# Patient Record
Sex: MALE | Race: WHITE | Employment: FULL TIME | URBAN - NONMETROPOLITAN AREA
[De-identification: names, ages, dates, MRNs, and addresses within clinical notes are randomized per-mention and may not be internally consistent; named-entity substitution may affect disease eponyms.]

---

## 2018-05-29 ENCOUNTER — INITIAL CONSULT (OUTPATIENT)
Dept: SURGERY | Age: 68
End: 2018-05-29
Payer: COMMERCIAL

## 2018-05-29 VITALS
DIASTOLIC BLOOD PRESSURE: 86 MMHG | HEIGHT: 74 IN | BODY MASS INDEX: 37.73 KG/M2 | WEIGHT: 294 LBS | SYSTOLIC BLOOD PRESSURE: 138 MMHG | HEART RATE: 88 BPM

## 2018-05-29 DIAGNOSIS — Z86.010 HISTORY OF COLON POLYPS: Primary | ICD-10-CM

## 2018-05-29 PROCEDURE — 99214 OFFICE O/P EST MOD 30 MIN: CPT | Performed by: SURGERY

## 2018-05-29 RX ORDER — POLYETHYLENE GLYCOL 3350, SODIUM CHLORIDE, SODIUM BICARBONATE, POTASSIUM CHLORIDE 420; 11.2; 5.72; 1.48 G/4L; G/4L; G/4L; G/4L
POWDER, FOR SOLUTION ORAL
Qty: 1 BOTTLE | Refills: 0 | Status: SHIPPED | OUTPATIENT
Start: 2018-05-29 | End: 2019-12-07

## 2018-06-07 ENCOUNTER — APPOINTMENT (OUTPATIENT)
Dept: GENERAL RADIOLOGY | Age: 68
End: 2018-06-07
Payer: COMMERCIAL

## 2018-06-07 ENCOUNTER — HOSPITAL ENCOUNTER (OUTPATIENT)
Age: 68
Setting detail: SPECIMEN
Discharge: HOME | End: 2018-06-07
Payer: COMMERCIAL

## 2018-06-07 ENCOUNTER — HOSPITAL ENCOUNTER (EMERGENCY)
Age: 68
Discharge: ANOTHER HOSPITAL | End: 2018-06-07
Attending: EMERGENCY MEDICINE
Payer: COMMERCIAL

## 2018-06-07 ENCOUNTER — OFFICE VISIT (OUTPATIENT)
Dept: PRIMARY CARE CLINIC | Age: 68
End: 2018-06-07
Payer: COMMERCIAL

## 2018-06-07 VITALS
TEMPERATURE: 100.6 F | BODY MASS INDEX: 38.39 KG/M2 | RESPIRATION RATE: 17 BRPM | HEART RATE: 117 BPM | SYSTOLIC BLOOD PRESSURE: 129 MMHG | OXYGEN SATURATION: 92 % | WEIGHT: 295 LBS | DIASTOLIC BLOOD PRESSURE: 67 MMHG

## 2018-06-07 VITALS
WEIGHT: 296 LBS | HEART RATE: 94 BPM | TEMPERATURE: 99.9 F | OXYGEN SATURATION: 94 % | SYSTOLIC BLOOD PRESSURE: 132 MMHG | BODY MASS INDEX: 38.52 KG/M2 | DIASTOLIC BLOOD PRESSURE: 74 MMHG

## 2018-06-07 DIAGNOSIS — R30.0 DYSURIA: ICD-10-CM

## 2018-06-07 DIAGNOSIS — N30.00 ACUTE CYSTITIS WITHOUT HEMATURIA: ICD-10-CM

## 2018-06-07 DIAGNOSIS — R00.0 TACHYCARDIA: ICD-10-CM

## 2018-06-07 DIAGNOSIS — R55 NEAR SYNCOPE: Primary | ICD-10-CM

## 2018-06-07 DIAGNOSIS — N39.0 BACTERIAL UTI: Primary | ICD-10-CM

## 2018-06-07 DIAGNOSIS — A49.9 BACTERIAL UTI: Primary | ICD-10-CM

## 2018-06-07 LAB
-: ABNORMAL
ABSOLUTE EOS #: 0.11 K/UL (ref 0–0.4)
ABSOLUTE IMMATURE GRANULOCYTE: ABNORMAL K/UL (ref 0–0.3)
ABSOLUTE LYMPH #: 0.56 K/UL (ref 1–4.8)
ABSOLUTE MONO #: 0.56 K/UL (ref 0.1–1.2)
AMORPHOUS: ABNORMAL
ANION GAP SERPL CALCULATED.3IONS-SCNC: 18 MMOL/L (ref 9–17)
BACTERIA: ABNORMAL
BASOPHILS # BLD: 1 % (ref 0–2)
BASOPHILS ABSOLUTE: 0.11 K/UL (ref 0–0.2)
BILIRUBIN URINE: NEGATIVE
BUN BLDV-MCNC: 21 MG/DL (ref 8–23)
BUN/CREAT BLD: 30 (ref 9–20)
CALCIUM SERPL-MCNC: 9.7 MG/DL (ref 8.6–10.4)
CASTS UA: ABNORMAL /LPF (ref 0–2)
CHLORIDE BLD-SCNC: 96 MMOL/L (ref 98–107)
CO2: 21 MMOL/L (ref 20–31)
COLOR: ABNORMAL
COMMENT UA: ABNORMAL
CREAT SERPL-MCNC: 0.71 MG/DL (ref 0.7–1.2)
CRYSTALS, UA: ABNORMAL /HPF
DIFFERENTIAL TYPE: ABNORMAL
EKG ATRIAL RATE: 132 BPM
EKG P AXIS: 68 DEGREES
EKG P-R INTERVAL: 146 MS
EKG Q-T INTERVAL: 300 MS
EKG QRS DURATION: 98 MS
EKG QTC CALCULATION (BAZETT): 444 MS
EKG R AXIS: -104 DEGREES
EKG T AXIS: 78 DEGREES
EKG VENTRICULAR RATE: 132 BPM
EOSINOPHILS RELATIVE PERCENT: 1 % (ref 1–8)
EPITHELIAL CELLS UA: ABNORMAL /HPF (ref 0–5)
GFR AFRICAN AMERICAN: >60 ML/MIN
GFR NON-AFRICAN AMERICAN: >60 ML/MIN
GFR SERPL CREATININE-BSD FRML MDRD: ABNORMAL ML/MIN/{1.73_M2}
GFR SERPL CREATININE-BSD FRML MDRD: ABNORMAL ML/MIN/{1.73_M2}
GLUCOSE BLD-MCNC: 318 MG/DL (ref 70–99)
GLUCOSE URINE: ABNORMAL
HCT VFR BLD CALC: 48.9 % (ref 41–53)
HEMOGLOBIN: 16.5 G/DL (ref 13.5–17.5)
IMMATURE GRANULOCYTES: ABNORMAL %
KETONES, URINE: ABNORMAL
LACTIC ACID: 2.2 MMOL/L (ref 0.5–2.2)
LEUKOCYTE ESTERASE, URINE: ABNORMAL
LYMPHOCYTES # BLD: 5 % (ref 15–43)
MCH RBC QN AUTO: 31.7 PG (ref 26–34)
MCHC RBC AUTO-ENTMCNC: 33.8 G/DL (ref 31–37)
MCV RBC AUTO: 93.8 FL (ref 80–100)
MONOCYTES # BLD: 5 % (ref 6–14)
MORPHOLOGY: ABNORMAL
MUCUS: ABNORMAL
NITRITE, URINE: POSITIVE
NRBC AUTOMATED: ABNORMAL PER 100 WBC
OTHER OBSERVATIONS UA: ABNORMAL
PDW BLD-RTO: 16 % (ref 11–14.5)
PH UA: 5.5 (ref 5–6)
PLATELET # BLD: 95 K/UL (ref 140–450)
PLATELET ESTIMATE: ABNORMAL
PMV BLD AUTO: 10.6 FL (ref 6–12)
POTASSIUM SERPL-SCNC: 4.6 MMOL/L (ref 3.7–5.3)
PROTEIN UA: NEGATIVE
RBC # BLD: 5.22 M/UL (ref 4.5–5.9)
RBC # BLD: ABNORMAL 10*6/UL
RBC UA: ABNORMAL /HPF (ref 0–4)
RENAL EPITHELIAL, UA: ABNORMAL /HPF
SEG NEUTROPHILS: 88 % (ref 44–74)
SEGMENTED NEUTROPHILS ABSOLUTE COUNT: 9.76 K/UL (ref 1.8–7.7)
SODIUM BLD-SCNC: 135 MMOL/L (ref 135–144)
SPECIFIC GRAVITY UA: 1.01 (ref 1.01–1.02)
TRICHOMONAS: ABNORMAL
TROPONIN INTERP: NORMAL
TROPONIN T: <0.03 NG/ML
TURBIDITY: ABNORMAL
URINE HGB: ABNORMAL
UROBILINOGEN, URINE: NORMAL
WBC # BLD: 11.1 K/UL (ref 3.5–11)
WBC # BLD: ABNORMAL 10*3/UL
WBC UA: ABNORMAL /HPF (ref 0–4)
YEAST: ABNORMAL

## 2018-06-07 PROCEDURE — 80048 BASIC METABOLIC PNL TOTAL CA: CPT

## 2018-06-07 PROCEDURE — 71045 X-RAY EXAM CHEST 1 VIEW: CPT

## 2018-06-07 PROCEDURE — 99202 OFFICE O/P NEW SF 15 MIN: CPT | Performed by: FAMILY MEDICINE

## 2018-06-07 PROCEDURE — 87086 URINE CULTURE/COLONY COUNT: CPT

## 2018-06-07 PROCEDURE — 2580000003 HC RX 258: Performed by: EMERGENCY MEDICINE

## 2018-06-07 PROCEDURE — 36415 COLL VENOUS BLD VENIPUNCTURE: CPT

## 2018-06-07 PROCEDURE — 93005 ELECTROCARDIOGRAM TRACING: CPT

## 2018-06-07 PROCEDURE — 83605 ASSAY OF LACTIC ACID: CPT

## 2018-06-07 PROCEDURE — 2500000003 HC RX 250 WO HCPCS

## 2018-06-07 PROCEDURE — 87040 BLOOD CULTURE FOR BACTERIA: CPT

## 2018-06-07 PROCEDURE — 87186 SC STD MICRODIL/AGAR DIL: CPT

## 2018-06-07 PROCEDURE — 6360000002 HC RX W HCPCS: Performed by: EMERGENCY MEDICINE

## 2018-06-07 PROCEDURE — 96375 TX/PRO/DX INJ NEW DRUG ADDON: CPT

## 2018-06-07 PROCEDURE — 84484 ASSAY OF TROPONIN QUANT: CPT

## 2018-06-07 PROCEDURE — 96365 THER/PROPH/DIAG IV INF INIT: CPT

## 2018-06-07 PROCEDURE — 85025 COMPLETE CBC W/AUTO DIFF WBC: CPT

## 2018-06-07 PROCEDURE — 87088 URINE BACTERIA CULTURE: CPT

## 2018-06-07 PROCEDURE — 99285 EMERGENCY DEPT VISIT HI MDM: CPT

## 2018-06-07 PROCEDURE — 81001 URINALYSIS AUTO W/SCOPE: CPT

## 2018-06-07 RX ORDER — LABETALOL HYDROCHLORIDE 5 MG/ML
INJECTION, SOLUTION INTRAVENOUS
Status: COMPLETED
Start: 2018-06-07 | End: 2018-06-07

## 2018-06-07 RX ORDER — LABETALOL HYDROCHLORIDE 5 MG/ML
10 INJECTION, SOLUTION INTRAVENOUS ONCE
Status: COMPLETED | OUTPATIENT
Start: 2018-06-07 | End: 2018-06-07

## 2018-06-07 RX ORDER — CIPROFLOXACIN 500 MG/1
500 TABLET, FILM COATED ORAL 2 TIMES DAILY
Qty: 14 TABLET | Refills: 0 | Status: SHIPPED | OUTPATIENT
Start: 2018-06-07 | End: 2018-06-14

## 2018-06-07 RX ORDER — 0.9 % SODIUM CHLORIDE 0.9 %
1000 INTRAVENOUS SOLUTION INTRAVENOUS ONCE
Status: COMPLETED | OUTPATIENT
Start: 2018-06-07 | End: 2018-06-07

## 2018-06-07 RX ORDER — CIPROFLOXACIN 2 MG/ML
400 INJECTION, SOLUTION INTRAVENOUS ONCE
Status: COMPLETED | OUTPATIENT
Start: 2018-06-07 | End: 2018-06-07

## 2018-06-07 RX ORDER — ONDANSETRON 2 MG/ML
4 INJECTION INTRAMUSCULAR; INTRAVENOUS ONCE
Status: COMPLETED | OUTPATIENT
Start: 2018-06-07 | End: 2018-06-07

## 2018-06-07 RX ADMIN — LABETALOL HYDROCHLORIDE 10 MG: 5 INJECTION, SOLUTION INTRAVENOUS at 19:16

## 2018-06-07 RX ADMIN — ONDANSETRON 4 MG: 2 INJECTION INTRAMUSCULAR; INTRAVENOUS at 18:34

## 2018-06-07 RX ADMIN — CIPROFLOXACIN 400 MG: 2 INJECTION, SOLUTION INTRAVENOUS at 20:19

## 2018-06-07 RX ADMIN — SODIUM CHLORIDE 1000 ML: 9 INJECTION, SOLUTION INTRAVENOUS at 18:35

## 2018-06-07 ASSESSMENT — ENCOUNTER SYMPTOMS
NAUSEA: 1
EYES NEGATIVE: 1
RESPIRATORY NEGATIVE: 1

## 2018-06-09 LAB
CULTURE: ABNORMAL
CULTURE: ABNORMAL
Lab: ABNORMAL
ORGANISM: ABNORMAL
SPECIMEN DESCRIPTION: ABNORMAL
SPECIMEN DESCRIPTION: ABNORMAL
STATUS: ABNORMAL

## 2018-06-14 LAB
CULTURE: NORMAL
CULTURE: NORMAL
Lab: NORMAL
SPECIMEN DESCRIPTION: NORMAL
SPECIMEN DESCRIPTION: NORMAL
STATUS: NORMAL

## 2018-07-26 ENCOUNTER — TELEPHONE (OUTPATIENT)
Dept: SURGERY | Age: 68
End: 2018-07-26

## 2018-07-26 DIAGNOSIS — Z12.11 COLON CANCER SCREENING: Primary | ICD-10-CM

## 2018-07-26 NOTE — TELEPHONE ENCOUNTER
Patient called in and said that he did not hold his Coumadin and Mobic prior to colonoscopy on 7/27/18. Colonoscopy rescheduled to 8/24/18. Patient needs new prescription for bowel prep as he has mixed up bowel prep already. Dr. Adalberto Dawson will be notified of upcoming colonoscopy and cardiac risk form will be faxed to him.

## 2018-07-27 RX ORDER — POLYETHYLENE GLYCOL 3350, SODIUM CHLORIDE, SODIUM BICARBONATE, POTASSIUM CHLORIDE 420; 11.2; 5.72; 1.48 G/4L; G/4L; G/4L; G/4L
4000 POWDER, FOR SOLUTION ORAL ONCE
Qty: 4000 ML | Refills: 0 | Status: SHIPPED | OUTPATIENT
Start: 2018-07-27 | End: 2018-07-27

## 2018-07-27 NOTE — TELEPHONE ENCOUNTER
Spoke with patient's wife and Dr. Antonio Lemus had called them and told her to have Kina Bhatia hold his Coumadin and Mobic  for 4 days prior to colonoscopy.

## 2019-12-07 ENCOUNTER — OFFICE VISIT (OUTPATIENT)
Dept: PRIMARY CARE CLINIC | Age: 69
End: 2019-12-07
Payer: COMMERCIAL

## 2019-12-07 ENCOUNTER — HOSPITAL ENCOUNTER (OUTPATIENT)
Age: 69
Setting detail: SPECIMEN
Discharge: HOME | End: 2019-12-07
Payer: COMMERCIAL

## 2019-12-07 VITALS
WEIGHT: 286 LBS | TEMPERATURE: 100.2 F | BODY MASS INDEX: 36.7 KG/M2 | RESPIRATION RATE: 18 BRPM | HEART RATE: 136 BPM | HEIGHT: 74 IN | OXYGEN SATURATION: 92 %

## 2019-12-07 DIAGNOSIS — R11.0 NAUSEA: ICD-10-CM

## 2019-12-07 DIAGNOSIS — R30.0 DYSURIA: ICD-10-CM

## 2019-12-07 DIAGNOSIS — N30.01 ACUTE CYSTITIS WITH HEMATURIA: Primary | ICD-10-CM

## 2019-12-07 LAB
-: ABNORMAL
AMORPHOUS: ABNORMAL
BACTERIA: ABNORMAL
BILIRUBIN URINE: NEGATIVE
CASTS UA: ABNORMAL /LPF (ref 0–2)
COLOR: ABNORMAL
COMMENT UA: ABNORMAL
CRYSTALS, UA: ABNORMAL /HPF
EPITHELIAL CELLS UA: ABNORMAL /HPF (ref 0–5)
GLUCOSE URINE: ABNORMAL
KETONES, URINE: ABNORMAL
LEUKOCYTE ESTERASE, URINE: NEGATIVE
MUCUS: ABNORMAL
NITRITE, URINE: POSITIVE
OTHER OBSERVATIONS UA: ABNORMAL
PH UA: 5 (ref 5–6)
PROTEIN UA: NEGATIVE
RBC UA: ABNORMAL /HPF (ref 0–4)
RENAL EPITHELIAL, UA: ABNORMAL /HPF
SPECIFIC GRAVITY UA: 1.02 (ref 1.01–1.02)
TRICHOMONAS: ABNORMAL
TURBIDITY: ABNORMAL
URINE HGB: ABNORMAL
UROBILINOGEN, URINE: NORMAL
WBC UA: ABNORMAL /HPF (ref 0–4)
YEAST: ABNORMAL

## 2019-12-07 PROCEDURE — 87186 SC STD MICRODIL/AGAR DIL: CPT

## 2019-12-07 PROCEDURE — 87088 URINE BACTERIA CULTURE: CPT

## 2019-12-07 PROCEDURE — 99213 OFFICE O/P EST LOW 20 MIN: CPT | Performed by: NURSE PRACTITIONER

## 2019-12-07 PROCEDURE — 81001 URINALYSIS AUTO W/SCOPE: CPT

## 2019-12-07 PROCEDURE — 87086 URINE CULTURE/COLONY COUNT: CPT

## 2019-12-07 RX ORDER — SILDENAFIL 100 MG/1
TABLET, FILM COATED ORAL
COMMUNITY
Start: 2013-12-27

## 2019-12-07 RX ORDER — CILOSTAZOL 100 MG/1
TABLET ORAL
Refills: 3 | COMMUNITY
Start: 2019-10-07

## 2019-12-07 RX ORDER — DAPAGLIFLOZIN 10 MG/1
10 TABLET, FILM COATED ORAL EVERY MORNING
COMMUNITY
Start: 2019-12-02

## 2019-12-07 RX ORDER — DILTIAZEM HYDROCHLORIDE 120 MG/1
CAPSULE, COATED, EXTENDED RELEASE ORAL
Refills: 11 | COMMUNITY
Start: 2019-11-15

## 2019-12-07 RX ORDER — CIPROFLOXACIN 500 MG/1
500 TABLET, FILM COATED ORAL 2 TIMES DAILY
Qty: 14 TABLET | Refills: 0 | Status: SHIPPED | OUTPATIENT
Start: 2019-12-07 | End: 2019-12-14

## 2019-12-07 RX ORDER — GLIPIZIDE 10 MG/1
TABLET, FILM COATED, EXTENDED RELEASE ORAL
Refills: 1 | COMMUNITY
Start: 2019-11-04 | End: 2022-08-29 | Stop reason: ALTCHOICE

## 2019-12-07 RX ORDER — TRIAMCINOLONE ACETONIDE 0.1 %
PASTE (GRAM) DENTAL
Refills: 1 | COMMUNITY
Start: 2019-10-30

## 2019-12-07 RX ORDER — ATORVASTATIN CALCIUM 40 MG/1
TABLET, FILM COATED ORAL
Refills: 3 | COMMUNITY
Start: 2019-10-21

## 2019-12-07 RX ORDER — FINASTERIDE 5 MG/1
TABLET, FILM COATED ORAL
Refills: 0 | COMMUNITY
Start: 2019-09-24

## 2019-12-07 RX ORDER — ONDANSETRON 4 MG/1
4 TABLET, ORALLY DISINTEGRATING ORAL ONCE
Status: COMPLETED | OUTPATIENT
Start: 2019-12-07 | End: 2019-12-07

## 2019-12-07 RX ADMIN — ONDANSETRON 4 MG: 4 TABLET, ORALLY DISINTEGRATING ORAL at 15:01

## 2019-12-07 ASSESSMENT — ENCOUNTER SYMPTOMS
NAUSEA: 1
RESPIRATORY NEGATIVE: 1
VOMITING: 0

## 2019-12-11 LAB
CULTURE: ABNORMAL
Lab: ABNORMAL
SPECIMEN DESCRIPTION: ABNORMAL

## 2020-11-30 ENCOUNTER — APPOINTMENT (OUTPATIENT)
Dept: CT IMAGING | Age: 70
DRG: 987 | End: 2020-11-30
Payer: COMMERCIAL

## 2020-11-30 ENCOUNTER — APPOINTMENT (OUTPATIENT)
Dept: GENERAL RADIOLOGY | Age: 70
DRG: 987 | End: 2020-11-30
Payer: COMMERCIAL

## 2020-11-30 ENCOUNTER — HOSPITAL ENCOUNTER (INPATIENT)
Age: 70
LOS: 3 days | Discharge: HOME | DRG: 987 | End: 2020-12-03
Attending: EMERGENCY MEDICINE | Admitting: INTERNAL MEDICINE
Payer: COMMERCIAL

## 2020-11-30 PROBLEM — L43.9 LICHEN PLANUS: Status: ACTIVE | Noted: 2020-11-30

## 2020-11-30 PROBLEM — G62.9 PERIPHERAL NEUROPATHY: Status: ACTIVE | Noted: 2020-11-30

## 2020-11-30 PROBLEM — I47.1 SUPRAVENTRICULAR TACHYCARDIA (HCC): Status: ACTIVE | Noted: 2019-11-14

## 2020-11-30 PROBLEM — I47.10 SUPRAVENTRICULAR TACHYCARDIA: Status: RESOLVED | Noted: 2019-11-14 | Resolved: 2020-11-30

## 2020-11-30 PROBLEM — K76.0 FATTY LIVER: Status: ACTIVE | Noted: 2020-11-30

## 2020-11-30 PROBLEM — N48.1 BALANITIS: Status: RESOLVED | Noted: 2017-04-18 | Resolved: 2020-11-30

## 2020-11-30 PROBLEM — N48.1 BALANITIS: Status: ACTIVE | Noted: 2017-04-18

## 2020-11-30 PROBLEM — F32.9 MAJOR DEPRESSIVE DISORDER: Status: ACTIVE | Noted: 2020-11-30

## 2020-11-30 PROBLEM — D68.51 FACTOR V LEIDEN MUTATION (HCC): Status: ACTIVE | Noted: 2019-12-16

## 2020-11-30 PROBLEM — I73.9 PAD (PERIPHERAL ARTERY DISEASE) (HCC): Status: ACTIVE | Noted: 2019-05-10

## 2020-11-30 PROBLEM — U07.1 COVID-19 VIRUS DETECTED: Status: ACTIVE | Noted: 2020-11-30

## 2020-11-30 PROBLEM — J12.82 PNEUMONIA DUE TO COVID-19 VIRUS: Status: ACTIVE | Noted: 2020-11-30

## 2020-11-30 PROBLEM — N41.0 ACUTE PROSTATITIS: Status: ACTIVE | Noted: 2019-12-16

## 2020-11-30 PROBLEM — L03.115 CELLULITIS OF RIGHT FOOT: Status: ACTIVE | Noted: 2019-12-16

## 2020-11-30 PROBLEM — I47.1 SUPRAVENTRICULAR TACHYCARDIA (HCC): Status: RESOLVED | Noted: 2019-11-14 | Resolved: 2020-11-30

## 2020-11-30 PROBLEM — R09.02 HYPOXIA: Status: ACTIVE | Noted: 2020-11-30

## 2020-11-30 PROBLEM — N41.0 ACUTE PROSTATITIS: Status: RESOLVED | Noted: 2019-12-16 | Resolved: 2020-11-30

## 2020-11-30 PROBLEM — N39.0 URINARY TRACT INFECTIOUS DISEASE: Status: RESOLVED | Noted: 2019-12-16 | Resolved: 2020-11-30

## 2020-11-30 PROBLEM — D69.6 THROMBOCYTOPENIA (HCC): Status: ACTIVE | Noted: 2020-11-30

## 2020-11-30 PROBLEM — J43.8 OTHER EMPHYSEMA (HCC): Status: ACTIVE | Noted: 2020-11-30

## 2020-11-30 PROBLEM — I47.10 SUPRAVENTRICULAR TACHYCARDIA: Status: ACTIVE | Noted: 2019-11-14

## 2020-11-30 PROBLEM — Z79.01 CURRENT USE OF LONG TERM ANTICOAGULATION: Status: ACTIVE | Noted: 2020-11-30

## 2020-11-30 PROBLEM — N52.9 ERECTILE DYSFUNCTION: Status: ACTIVE | Noted: 2020-11-30

## 2020-11-30 PROBLEM — N39.0 URINARY TRACT INFECTIOUS DISEASE: Status: ACTIVE | Noted: 2019-12-16

## 2020-11-30 PROBLEM — L03.115 CELLULITIS OF RIGHT FOOT: Status: RESOLVED | Noted: 2019-12-16 | Resolved: 2020-11-30

## 2020-11-30 LAB
-: NORMAL
ABSOLUTE EOS #: 0 K/UL (ref 0–0.4)
ABSOLUTE IMMATURE GRANULOCYTE: 0.29 K/UL (ref 0–0.3)
ABSOLUTE LYMPH #: 0.58 K/UL (ref 1–4.8)
ABSOLUTE MONO #: 0.66 K/UL (ref 0.1–1.2)
ALBUMIN SERPL-MCNC: 4.1 G/DL (ref 3.5–5.2)
ALBUMIN/GLOBULIN RATIO: 1.2 (ref 1–2.5)
ALP BLD-CCNC: 72 U/L (ref 40–129)
ALT SERPL-CCNC: 31 U/L (ref 5–41)
AMORPHOUS: NORMAL
ANION GAP SERPL CALCULATED.3IONS-SCNC: 14 MMOL/L (ref 9–17)
AST SERPL-CCNC: 22 U/L
ATYPICAL LYMPHOCYTE ABSOLUTE COUNT: 0.07 K/UL
ATYPICAL LYMPHOCYTES: 1 %
BACTERIA: NORMAL
BASOPHILS # BLD: 0 % (ref 0–2)
BASOPHILS ABSOLUTE: 0 K/UL (ref 0–0.2)
BILIRUB SERPL-MCNC: 0.94 MG/DL (ref 0.3–1.2)
BILIRUBIN URINE: NEGATIVE
BUN BLDV-MCNC: 19 MG/DL (ref 8–23)
BUN/CREAT BLD: 19 (ref 9–20)
CALCIUM SERPL-MCNC: 9.1 MG/DL (ref 8.6–10.4)
CASTS UA: NORMAL /LPF (ref 0–2)
CHLORIDE BLD-SCNC: 98 MMOL/L (ref 98–107)
CO2: 22 MMOL/L (ref 20–31)
COLOR: ABNORMAL
COMMENT UA: ABNORMAL
CREAT SERPL-MCNC: 1.01 MG/DL (ref 0.7–1.2)
CRYSTALS, UA: NORMAL /HPF
D-DIMER QUANTITATIVE: 0.81 MG/L FEU (ref 0–0.59)
DIFFERENTIAL TYPE: ABNORMAL
EKG ATRIAL RATE: 143 BPM
EKG P AXIS: 44 DEGREES
EKG P-R INTERVAL: 146 MS
EKG Q-T INTERVAL: 268 MS
EKG QRS DURATION: 94 MS
EKG QTC CALCULATION (BAZETT): 413 MS
EKG R AXIS: -166 DEGREES
EKG T AXIS: 49 DEGREES
EKG VENTRICULAR RATE: 143 BPM
EOSINOPHILS RELATIVE PERCENT: 0 % (ref 1–8)
EPITHELIAL CELLS UA: NORMAL /HPF (ref 0–5)
GFR AFRICAN AMERICAN: >60 ML/MIN
GFR NON-AFRICAN AMERICAN: >60 ML/MIN
GFR SERPL CREATININE-BSD FRML MDRD: ABNORMAL ML/MIN/{1.73_M2}
GFR SERPL CREATININE-BSD FRML MDRD: ABNORMAL ML/MIN/{1.73_M2}
GLUCOSE BLD-MCNC: 151 MG/DL (ref 75–110)
GLUCOSE BLD-MCNC: 170 MG/DL (ref 75–110)
GLUCOSE BLD-MCNC: 197 MG/DL (ref 70–99)
GLUCOSE URINE: ABNORMAL
HCT VFR BLD CALC: 49.5 % (ref 40.7–50.3)
HEMOGLOBIN: 16.1 G/DL (ref 13–17)
IMMATURE GRANULOCYTES: 4 %
INR BLD: 1.2
KETONES, URINE: ABNORMAL
LACTATE DEHYDROGENASE: 198 U/L (ref 135–225)
LACTIC ACID: 1.4 MMOL/L (ref 0.5–2.2)
LACTIC ACID: 2.2 MMOL/L (ref 0.5–2.2)
LEUKOCYTE ESTERASE, URINE: NEGATIVE
LYMPHOCYTES # BLD: 8 % (ref 15–43)
MAGNESIUM: 1.8 MG/DL (ref 1.6–2.6)
MCH RBC QN AUTO: 29.9 PG (ref 25.2–33.5)
MCHC RBC AUTO-ENTMCNC: 32.5 G/DL (ref 25.2–33.5)
MCV RBC AUTO: 92 FL (ref 82.6–102.9)
MONOCYTES # BLD: 9 % (ref 6–14)
MORPHOLOGY: ABNORMAL
MORPHOLOGY: ABNORMAL
MUCUS: NORMAL
NITRITE, URINE: NEGATIVE
NRBC AUTOMATED: 0 PER 100 WBC
OTHER OBSERVATIONS UA: NORMAL
PARTIAL THROMBOPLASTIN TIME: 31.7 SEC (ref 23.9–33.8)
PDW BLD-RTO: 15.9 % (ref 11.8–14.4)
PH UA: 6 (ref 5–6)
PLATELET # BLD: 54 K/UL (ref 138–453)
PLATELET ESTIMATE: ABNORMAL
PMV BLD AUTO: 12.6 FL (ref 8.1–13.5)
POTASSIUM SERPL-SCNC: 4.6 MMOL/L (ref 3.7–5.3)
PROTEIN UA: NEGATIVE
PROTHROMBIN TIME: 15.2 SEC (ref 11.5–14.2)
RBC # BLD: 5.38 M/UL (ref 4.21–5.77)
RBC # BLD: ABNORMAL 10*6/UL
RBC UA: NORMAL /HPF (ref 0–4)
RENAL EPITHELIAL, UA: NORMAL /HPF
SARS-COV-2, RAPID: DETECTED
SARS-COV-2: ABNORMAL
SARS-COV-2: ABNORMAL
SEG NEUTROPHILS: 78 % (ref 44–74)
SEGMENTED NEUTROPHILS ABSOLUTE COUNT: 5.7 K/UL (ref 1.5–8.1)
SODIUM BLD-SCNC: 134 MMOL/L (ref 135–144)
SOURCE: ABNORMAL
SPECIFIC GRAVITY UA: 1.02 (ref 1.01–1.02)
TOTAL PROTEIN: 7.6 G/DL (ref 6.4–8.3)
TRICHOMONAS: NORMAL
TROPONIN INTERP: ABNORMAL
TROPONIN T: ABNORMAL NG/ML
TROPONIN, HIGH SENSITIVITY: 27 NG/L (ref 0–22)
TROPONIN, HIGH SENSITIVITY: 36 NG/L (ref 0–22)
TROPONIN, HIGH SENSITIVITY: 39 NG/L (ref 0–22)
TURBIDITY: ABNORMAL
URINE HGB: ABNORMAL
UROBILINOGEN, URINE: NORMAL
WBC # BLD: 7.3 K/UL (ref 3.5–11.3)
WBC # BLD: ABNORMAL 10*3/UL
WBC UA: NORMAL /HPF (ref 0–4)
YEAST: NORMAL

## 2020-11-30 PROCEDURE — 6360000002 HC RX W HCPCS

## 2020-11-30 PROCEDURE — 87150 DNA/RNA AMPLIFIED PROBE: CPT

## 2020-11-30 PROCEDURE — 86403 PARTICLE AGGLUT ANTBDY SCRN: CPT

## 2020-11-30 PROCEDURE — 87040 BLOOD CULTURE FOR BACTERIA: CPT

## 2020-11-30 PROCEDURE — 2580000003 HC RX 258: Performed by: NURSE PRACTITIONER

## 2020-11-30 PROCEDURE — 86140 C-REACTIVE PROTEIN: CPT

## 2020-11-30 PROCEDURE — 85379 FIBRIN DEGRADATION QUANT: CPT

## 2020-11-30 PROCEDURE — APPSS45 APP SPLIT SHARED TIME 31-45 MINUTES: Performed by: NURSE PRACTITIONER

## 2020-11-30 PROCEDURE — XW033E5 INTRODUCTION OF REMDESIVIR ANTI-INFECTIVE INTO PERIPHERAL VEIN, PERCUTANEOUS APPROACH, NEW TECHNOLOGY GROUP 5: ICD-10-PCS | Performed by: EMERGENCY MEDICINE

## 2020-11-30 PROCEDURE — 85025 COMPLETE CBC W/AUTO DIFF WBC: CPT

## 2020-11-30 PROCEDURE — 6360000004 HC RX CONTRAST MEDICATION: Performed by: EMERGENCY MEDICINE

## 2020-11-30 PROCEDURE — 83605 ASSAY OF LACTIC ACID: CPT

## 2020-11-30 PROCEDURE — 94664 DEMO&/EVAL PT USE INHALER: CPT

## 2020-11-30 PROCEDURE — 6370000000 HC RX 637 (ALT 250 FOR IP): Performed by: NURSE PRACTITIONER

## 2020-11-30 PROCEDURE — 6360000002 HC RX W HCPCS: Performed by: EMERGENCY MEDICINE

## 2020-11-30 PROCEDURE — 96365 THER/PROPH/DIAG IV INF INIT: CPT

## 2020-11-30 PROCEDURE — U0002 COVID-19 LAB TEST NON-CDC: HCPCS

## 2020-11-30 PROCEDURE — 71260 CT THORAX DX C+: CPT

## 2020-11-30 PROCEDURE — 80053 COMPREHEN METABOLIC PANEL: CPT

## 2020-11-30 PROCEDURE — 94150 VITAL CAPACITY TEST: CPT

## 2020-11-30 PROCEDURE — 2580000003 HC RX 258: Performed by: EMERGENCY MEDICINE

## 2020-11-30 PROCEDURE — 83615 LACTATE (LD) (LDH) ENZYME: CPT

## 2020-11-30 PROCEDURE — 6360000002 HC RX W HCPCS: Performed by: NURSE PRACTITIONER

## 2020-11-30 PROCEDURE — 82728 ASSAY OF FERRITIN: CPT

## 2020-11-30 PROCEDURE — 85610 PROTHROMBIN TIME: CPT

## 2020-11-30 PROCEDURE — 71045 X-RAY EXAM CHEST 1 VIEW: CPT

## 2020-11-30 PROCEDURE — 99285 EMERGENCY DEPT VISIT HI MDM: CPT

## 2020-11-30 PROCEDURE — 94761 N-INVAS EAR/PLS OXIMETRY MLT: CPT

## 2020-11-30 PROCEDURE — 96375 TX/PRO/DX INJ NEW DRUG ADDON: CPT

## 2020-11-30 PROCEDURE — 36415 COLL VENOUS BLD VENIPUNCTURE: CPT

## 2020-11-30 PROCEDURE — 81001 URINALYSIS AUTO W/SCOPE: CPT

## 2020-11-30 PROCEDURE — 85730 THROMBOPLASTIN TIME PARTIAL: CPT

## 2020-11-30 PROCEDURE — 82947 ASSAY GLUCOSE BLOOD QUANT: CPT

## 2020-11-30 PROCEDURE — 96374 THER/PROPH/DIAG INJ IV PUSH: CPT

## 2020-11-30 PROCEDURE — 83735 ASSAY OF MAGNESIUM: CPT

## 2020-11-30 PROCEDURE — 93005 ELECTROCARDIOGRAM TRACING: CPT | Performed by: EMERGENCY MEDICINE

## 2020-11-30 PROCEDURE — 2060000000 HC ICU INTERMEDIATE R&B

## 2020-11-30 PROCEDURE — 87205 SMEAR GRAM STAIN: CPT

## 2020-11-30 PROCEDURE — 6370000000 HC RX 637 (ALT 250 FOR IP): Performed by: EMERGENCY MEDICINE

## 2020-11-30 PROCEDURE — 2700000000 HC OXYGEN THERAPY PER DAY

## 2020-11-30 PROCEDURE — 84484 ASSAY OF TROPONIN QUANT: CPT

## 2020-11-30 RX ORDER — BUPROPION HYDROCHLORIDE 100 MG/1
100 TABLET, EXTENDED RELEASE ORAL 2 TIMES DAILY
Status: DISCONTINUED | OUTPATIENT
Start: 2020-11-30 | End: 2020-12-03 | Stop reason: HOSPADM

## 2020-11-30 RX ORDER — SODIUM CHLORIDE 0.9 % (FLUSH) 0.9 %
10 SYRINGE (ML) INJECTION PRN
Status: DISCONTINUED | OUTPATIENT
Start: 2020-11-30 | End: 2020-12-03 | Stop reason: HOSPADM

## 2020-11-30 RX ORDER — CETIRIZINE HYDROCHLORIDE 5 MG/1
10 TABLET ORAL DAILY
Status: DISCONTINUED | OUTPATIENT
Start: 2020-12-01 | End: 2020-12-03 | Stop reason: HOSPADM

## 2020-11-30 RX ORDER — CILOSTAZOL 100 MG/1
100 TABLET ORAL 2 TIMES DAILY
Status: DISCONTINUED | OUTPATIENT
Start: 2020-11-30 | End: 2020-12-03 | Stop reason: HOSPADM

## 2020-11-30 RX ORDER — FINASTERIDE 5 MG/1
5 TABLET, FILM COATED ORAL DAILY
Status: DISCONTINUED | OUTPATIENT
Start: 2020-12-01 | End: 2020-12-03 | Stop reason: HOSPADM

## 2020-11-30 RX ORDER — KETOROLAC TROMETHAMINE 30 MG/ML
30 INJECTION, SOLUTION INTRAMUSCULAR; INTRAVENOUS ONCE
Status: COMPLETED | OUTPATIENT
Start: 2020-11-30 | End: 2020-11-30

## 2020-11-30 RX ORDER — GUAIFENESIN/DEXTROMETHORPHAN 100-10MG/5
5 SYRUP ORAL EVERY 4 HOURS PRN
Status: DISCONTINUED | OUTPATIENT
Start: 2020-11-30 | End: 2020-12-03 | Stop reason: HOSPADM

## 2020-11-30 RX ORDER — INSULIN GLARGINE 100 [IU]/ML
30 INJECTION, SOLUTION SUBCUTANEOUS NIGHTLY
Status: DISCONTINUED | OUTPATIENT
Start: 2020-12-01 | End: 2020-12-03 | Stop reason: HOSPADM

## 2020-11-30 RX ORDER — LEVALBUTEROL INHALATION SOLUTION 0.63 MG/3ML
0.63 SOLUTION RESPIRATORY (INHALATION) EVERY 6 HOURS PRN
Status: DISCONTINUED | OUTPATIENT
Start: 2020-11-30 | End: 2020-12-03 | Stop reason: HOSPADM

## 2020-11-30 RX ORDER — HEPARIN SODIUM 5000 [USP'U]/ML
5000 INJECTION, SOLUTION INTRAVENOUS; SUBCUTANEOUS EVERY 8 HOURS SCHEDULED
Status: DISCONTINUED | OUTPATIENT
Start: 2020-11-30 | End: 2020-11-30

## 2020-11-30 RX ORDER — 0.9 % SODIUM CHLORIDE 0.9 %
30 INTRAVENOUS SOLUTION INTRAVENOUS PRN
Status: DISCONTINUED | OUTPATIENT
Start: 2020-11-30 | End: 2020-12-03 | Stop reason: HOSPADM

## 2020-11-30 RX ORDER — DEXAMETHASONE SODIUM PHOSPHATE 4 MG/ML
10 INJECTION, SOLUTION INTRA-ARTICULAR; INTRALESIONAL; INTRAMUSCULAR; INTRAVENOUS; SOFT TISSUE DAILY
Status: DISCONTINUED | OUTPATIENT
Start: 2020-12-01 | End: 2020-11-30

## 2020-11-30 RX ORDER — DILTIAZEM HYDROCHLORIDE 120 MG/1
120 CAPSULE, COATED, EXTENDED RELEASE ORAL DAILY
Status: DISCONTINUED | OUTPATIENT
Start: 2020-12-01 | End: 2020-12-03 | Stop reason: HOSPADM

## 2020-11-30 RX ORDER — BUPROPION HYDROCHLORIDE 100 MG/1
TABLET ORAL 2 TIMES DAILY
COMMUNITY

## 2020-11-30 RX ORDER — GABAPENTIN 100 MG/1
100 CAPSULE ORAL 2 TIMES DAILY
COMMUNITY

## 2020-11-30 RX ORDER — VITAMIN B COMPLEX
2000 TABLET ORAL DAILY
Status: DISCONTINUED | OUTPATIENT
Start: 2020-12-01 | End: 2020-12-03 | Stop reason: HOSPADM

## 2020-11-30 RX ORDER — NICOTINE POLACRILEX 4 MG
15 LOZENGE BUCCAL PRN
Status: DISCONTINUED | OUTPATIENT
Start: 2020-11-30 | End: 2020-12-03 | Stop reason: HOSPADM

## 2020-11-30 RX ORDER — BUDESONIDE AND FORMOTEROL FUMARATE DIHYDRATE 160; 4.5 UG/1; UG/1
2 AEROSOL RESPIRATORY (INHALATION) 2 TIMES DAILY
Status: DISCONTINUED | OUTPATIENT
Start: 2020-11-30 | End: 2020-12-03 | Stop reason: HOSPADM

## 2020-11-30 RX ORDER — INSULIN GLARGINE 100 [IU]/ML
50 INJECTION, SOLUTION SUBCUTANEOUS DAILY
Status: DISCONTINUED | OUTPATIENT
Start: 2020-12-01 | End: 2020-11-30

## 2020-11-30 RX ORDER — LEVALBUTEROL TARTRATE 45 UG/1
1 AEROSOL, METERED ORAL EVERY 6 HOURS PRN
Status: DISCONTINUED | OUTPATIENT
Start: 2020-11-30 | End: 2020-11-30 | Stop reason: CLARIF

## 2020-11-30 RX ORDER — DEXTROSE MONOHYDRATE 50 MG/ML
100 INJECTION, SOLUTION INTRAVENOUS PRN
Status: DISCONTINUED | OUTPATIENT
Start: 2020-11-30 | End: 2020-12-03 | Stop reason: HOSPADM

## 2020-11-30 RX ORDER — INSULIN GLARGINE 100 [IU]/ML
20 INJECTION, SOLUTION SUBCUTANEOUS ONCE
Status: COMPLETED | OUTPATIENT
Start: 2020-11-30 | End: 2020-11-30

## 2020-11-30 RX ORDER — ATORVASTATIN CALCIUM 40 MG/1
40 TABLET, FILM COATED ORAL DAILY
Status: DISCONTINUED | OUTPATIENT
Start: 2020-12-01 | End: 2020-12-03 | Stop reason: HOSPADM

## 2020-11-30 RX ORDER — SODIUM CHLORIDE 9 MG/ML
INJECTION, SOLUTION INTRAVENOUS ONCE
Status: COMPLETED | OUTPATIENT
Start: 2020-11-30 | End: 2020-11-30

## 2020-11-30 RX ORDER — BUPROPION HYDROCHLORIDE 100 MG/1
100 TABLET ORAL 2 TIMES DAILY
Status: DISCONTINUED | OUTPATIENT
Start: 2020-11-30 | End: 2020-11-30

## 2020-11-30 RX ORDER — INSULIN GLARGINE 100 [IU]/ML
30 INJECTION, SOLUTION SUBCUTANEOUS DAILY
Status: DISCONTINUED | OUTPATIENT
Start: 2020-12-01 | End: 2020-12-02

## 2020-11-30 RX ORDER — SODIUM CHLORIDE 0.9 % (FLUSH) 0.9 %
10 SYRINGE (ML) INJECTION EVERY 12 HOURS SCHEDULED
Status: DISCONTINUED | OUTPATIENT
Start: 2020-11-30 | End: 2020-12-03 | Stop reason: HOSPADM

## 2020-11-30 RX ORDER — SODIUM CHLORIDE 9 MG/ML
INJECTION, SOLUTION INTRAVENOUS CONTINUOUS
Status: DISCONTINUED | OUTPATIENT
Start: 2020-11-30 | End: 2020-12-03

## 2020-11-30 RX ORDER — INSULIN GLARGINE 100 [IU]/ML
10 INJECTION, SOLUTION SUBCUTANEOUS NIGHTLY
Status: DISCONTINUED | OUTPATIENT
Start: 2020-11-30 | End: 2020-11-30

## 2020-11-30 RX ORDER — ACETAMINOPHEN 500 MG
1000 TABLET ORAL ONCE
Status: COMPLETED | OUTPATIENT
Start: 2020-11-30 | End: 2020-11-30

## 2020-11-30 RX ORDER — ONDANSETRON 2 MG/ML
INJECTION INTRAMUSCULAR; INTRAVENOUS
Status: COMPLETED
Start: 2020-11-30 | End: 2020-11-30

## 2020-11-30 RX ORDER — 0.9 % SODIUM CHLORIDE 0.9 %
500 INTRAVENOUS SOLUTION INTRAVENOUS ONCE
Status: COMPLETED | OUTPATIENT
Start: 2020-11-30 | End: 2020-11-30

## 2020-11-30 RX ORDER — ACETAMINOPHEN 325 MG/1
650 TABLET ORAL EVERY 6 HOURS PRN
Status: DISCONTINUED | OUTPATIENT
Start: 2020-11-30 | End: 2020-12-03 | Stop reason: HOSPADM

## 2020-11-30 RX ORDER — ACETAMINOPHEN 650 MG/1
650 SUPPOSITORY RECTAL EVERY 6 HOURS PRN
Status: DISCONTINUED | OUTPATIENT
Start: 2020-11-30 | End: 2020-12-01

## 2020-11-30 RX ORDER — ONDANSETRON 2 MG/ML
4 INJECTION INTRAMUSCULAR; INTRAVENOUS ONCE
Status: COMPLETED | OUTPATIENT
Start: 2020-11-30 | End: 2020-11-30

## 2020-11-30 RX ORDER — INSULIN GLARGINE 100 [IU]/ML
55 INJECTION, SOLUTION SUBCUTANEOUS DAILY
Status: DISCONTINUED | OUTPATIENT
Start: 2020-12-01 | End: 2020-11-30

## 2020-11-30 RX ORDER — ALBUTEROL SULFATE 90 UG/1
2 AEROSOL, METERED RESPIRATORY (INHALATION) EVERY 4 HOURS
Status: DISCONTINUED | OUTPATIENT
Start: 2020-12-01 | End: 2020-12-03 | Stop reason: HOSPADM

## 2020-11-30 RX ORDER — GABAPENTIN 100 MG/1
200 CAPSULE ORAL 2 TIMES DAILY
Status: DISCONTINUED | OUTPATIENT
Start: 2020-11-30 | End: 2020-12-03 | Stop reason: HOSPADM

## 2020-11-30 RX ORDER — TAMSULOSIN HYDROCHLORIDE 0.4 MG/1
0.4 CAPSULE ORAL DAILY
Status: DISCONTINUED | OUTPATIENT
Start: 2020-12-01 | End: 2020-12-03 | Stop reason: HOSPADM

## 2020-11-30 RX ORDER — DULAGLUTIDE 1.5 MG/.5ML
1.5 INJECTION, SOLUTION SUBCUTANEOUS WEEKLY
COMMUNITY

## 2020-11-30 RX ORDER — OXYCODONE HYDROCHLORIDE AND ACETAMINOPHEN 5; 325 MG/1; MG/1
1 TABLET ORAL EVERY 4 HOURS PRN
Status: DISCONTINUED | OUTPATIENT
Start: 2020-11-30 | End: 2020-12-03 | Stop reason: HOSPADM

## 2020-11-30 RX ORDER — DEXTROSE MONOHYDRATE 25 G/50ML
12.5 INJECTION, SOLUTION INTRAVENOUS PRN
Status: DISCONTINUED | OUTPATIENT
Start: 2020-11-30 | End: 2020-12-03 | Stop reason: HOSPADM

## 2020-11-30 RX ADMIN — CILOSTAZOL 100 MG: 100 TABLET ORAL at 22:55

## 2020-11-30 RX ADMIN — SODIUM CHLORIDE 500 ML: 0.9 INJECTION, SOLUTION INTRAVENOUS at 20:23

## 2020-11-30 RX ADMIN — IOPAMIDOL 80 ML: 755 INJECTION, SOLUTION INTRAVENOUS at 16:15

## 2020-11-30 RX ADMIN — KETOROLAC TROMETHAMINE 30 MG: 30 INJECTION, SOLUTION INTRAMUSCULAR at 18:19

## 2020-11-30 RX ADMIN — CEFTRIAXONE 1 G: 1 INJECTION, POWDER, FOR SOLUTION INTRAMUSCULAR; INTRAVENOUS at 13:21

## 2020-11-30 RX ADMIN — OXYCODONE AND ACETAMINOPHEN 1 TABLET: 5; 325 TABLET ORAL at 23:58

## 2020-11-30 RX ADMIN — IOPAMIDOL 100 ML: 755 INJECTION, SOLUTION INTRAVENOUS at 16:33

## 2020-11-30 RX ADMIN — SODIUM CHLORIDE: 9 INJECTION, SOLUTION INTRAVENOUS at 21:56

## 2020-11-30 RX ADMIN — INSULIN LISPRO 1 UNITS: 100 INJECTION, SOLUTION INTRAVENOUS; SUBCUTANEOUS at 22:53

## 2020-11-30 RX ADMIN — INSULIN GLARGINE 10 UNITS: 100 INJECTION, SOLUTION SUBCUTANEOUS at 22:57

## 2020-11-30 RX ADMIN — SODIUM CHLORIDE: 9 INJECTION, SOLUTION INTRAVENOUS at 19:00

## 2020-11-30 RX ADMIN — ACETAMINOPHEN 1000 MG: 500 TABLET, FILM COATED ORAL at 16:55

## 2020-11-30 RX ADMIN — INSULIN GLARGINE 20 UNITS: 100 INJECTION, SOLUTION SUBCUTANEOUS at 23:59

## 2020-11-30 RX ADMIN — ONDANSETRON 4 MG: 2 INJECTION INTRAMUSCULAR; INTRAVENOUS at 14:08

## 2020-11-30 RX ADMIN — GABAPENTIN 200 MG: 100 CAPSULE ORAL at 22:54

## 2020-11-30 RX ADMIN — AZITHROMYCIN MONOHYDRATE 500 MG: 500 INJECTION, POWDER, LYOPHILIZED, FOR SOLUTION INTRAVENOUS at 22:53

## 2020-11-30 RX ADMIN — APIXABAN 5 MG: 2.5 TABLET, FILM COATED ORAL at 22:55

## 2020-11-30 ASSESSMENT — ENCOUNTER SYMPTOMS
SHORTNESS OF BREATH: 1
CONSTIPATION: 0
TROUBLE SWALLOWING: 0
VOMITING: 0
SORE THROAT: 0
BLOOD IN STOOL: 0
WHEEZING: 0
ABDOMINAL PAIN: 0
BACK PAIN: 0
NAUSEA: 0
DIARRHEA: 0

## 2020-11-30 ASSESSMENT — PAIN SCALES - GENERAL
PAINLEVEL_OUTOF10: 8
PAINLEVEL_OUTOF10: 6
PAINLEVEL_OUTOF10: 8
PAINLEVEL_OUTOF10: 6

## 2020-11-30 NOTE — ED PROVIDER NOTES
[address] [facility]  eMERGENCY dEPARTMENT eNCOUnter      Pt Name: Lainey Camilo  MRN: [id number]  [place] 1950  Date of evaluation: 11/30/2020      CHIEF COMPLAINT       Chief Complaint   Patient presents with    Fever    Shortness of Breath     with exertion    Urinary Frequency         HISTORY OF PRESENT ILLNESS    Lainey Camilo is a 81 y.o. 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 There is been no peripheral edema. He does have a history of a prior PE as well      REVIEW OF SYSTEMS         Review of Systems   Constitutional: Positive for fatigue and fever. Negative for chills. HENT: Negative for congestion, dental problem, sore throat and trouble swallowing. Eyes: Negative for visual disturbance. Respiratory: Positive for shortness of breath. Negative for wheezing. Cardiovascular: Positive for palpitations. Negative for chest pain and leg swelling. Gastrointestinal: Negative for abdominal pain, blood in stool, constipation, diarrhea, nausea and vomiting. Genitourinary: Positive for dysuria and frequency. Negative for difficulty urinating and testicular pain. Musculoskeletal: Negative for back pain, joint swelling and neck pain. Skin: Negative for rash. Neurological: Negative for dizziness, syncope, weakness and headaches. Hematological: Negative for adenopathy. Does not bruise/bleed easily. Psychiatric/Behavioral: Negative for confusion and suicidal ideas.           PAST MEDICAL HISTORY    has a past medical history of Asthma, BPH (benign prostatic hyperplasia), History of prostatitis, History of pulmonary embolism, History of tobacco abuse, Hypertension, JANET on CPAP, and Type 2 diabetes mellitus ([facility] [address] SURGICAL HISTORY      has a past surgical history that includes Knee arthroscopy (Right); hernia repair (2003); Wrist surgery (Right); Elbow surgery (Right); Colonoscopy; Vena Cava Filter Placement (2012); joint replacement (Right); and Colonoscopy (08/03/2018). CURRENT MEDICATIONS       Previous Medications    ALBUTEROL (PROVENTIL HFA;VENTOLIN HFA) 108 (90 BASE) MCG/ACT INHALER    Inhale 2 puffs into the lungs every 6 hours as needed. ATORVASTATIN (LIPITOR) 40 MG TABLET    TAKE 1 TABLET BY MOUTH EVERY DAY    CHOLECALCIFEROL (VITAMIN D3) 2000 UNITS CAPS    Take  by mouth. CILOSTAZOL (PLETAL) 100 MG TABLET    TAKE 1 TABLET BY MOUTH TWICE A DAY    DICLOFENAC SODIUM 1 % GEL    Apply 1 gram to affected joint bid    DILTIAZEM (CARDIZEM CD) 120 MG EXTENDED RELEASE CAPSULE    TAKE 1 CAPSULE BY MOUTH EVERY DAY    DULAGLUTIDE (TRULICITY) 1.5 TI/9.6IW SOPN    Inject 1.5 mg into the skin once a week    FARXIGA 10 MG TABLET        FINASTERIDE (PROSCAR) 5 MG TABLET    TAKE 1 TABLET BY MOUTH EVERY DAY    FISH OIL    by Does not apply route. FLUTICASONE-SALMETEROL (ADVAIR DISKUS) 250-50 MCG/DOSE AEPB    INHALE 1 PUFF INTO THE LUNGS 2 (TWO) TIMES DAILY. GLIPIZIDE (GLUCOTROL XL) 10 MG EXTENDED RELEASE TABLET    TAKE 1 TABLET BY MOUTH TWICE A DAY    INSULIN DETEMIR (LEVEMIR FLEXTOUCH) 100 UNIT/ML INJECTION PEN    50 Units daily    INSULIN LISPRO (HUMALOG) 100 UNIT/ML INJECTION VIAL    Inject 5 Units into the skin 3 times daily (before meals)    LISINOPRIL (PRINIVIL;ZESTRIL) 20 MG TABLET    Take 20 mg by mouth Once in dialysis. LORATADINE (CLARITIN) 10 MG TABLET    Take 10 mg by mouth daily. MOMETASONE FURO-FORMOTEROL FUM (DULERA IN)    Inhale  into the lungs daily. SILDENAFIL (VIAGRA) 100 MG TABLET        TAMSULOSIN (FLOMAX) 0.4 MG CAPSULE    Take 0.4 mg by mouth daily. TESTOSTERONE IM    Inject 1 mL into the muscle.  2 times a month    TRIAMCINOLONE ACETONIDE (KENALOG) 0.1 % PASTE    USE AS DIRECTED 1 TREATMENT IN THE MOUTH OR THROAT 2 (TWO) TIMES DAILY. ALLERGIES     is allergic to amoxicillin. FAMILY HISTORY     He indicated that his mother is . He indicated that his father is . family history includes Cancer in his mother; Diabetes in his father; Kidney Disease in his father. SOCIAL HISTORY      reports that he quit smoking about 13 years ago. He quit smokeless tobacco use about 13 years ago. He reports current alcohol use. He reports that he does not use drugs. PHYSICAL EXAM     INITIAL VITALS:  weight is 250 lb (113.4 kg). His tympanic temperature is 101.9 °F (38.8 °C). His blood pressure is 92/57 (abnormal) and his pulse is 114. His respiration is 22 and oxygen saturation is 96%. Physical Exam  Constitutional:       General: He is not in acute distress. Appearance: He is well-developed. He is obese. He is not ill-appearing, toxic-appearing or diaphoretic. Comments: Sats are in the 80s on room air, up to low 90s on O2   HENT:      Head: Normocephalic and atraumatic. Right Ear: External ear normal.      Left Ear: External ear normal.   Eyes:      Pupils: Pupils are equal, round, and reactive to light. Neck:      Musculoskeletal: Normal range of motion and neck supple. Cardiovascular:      Rate and Rhythm: Normal rate and regular rhythm. Pulmonary:      Effort: Pulmonary effort is normal.      Breath sounds: Normal breath sounds. No decreased breath sounds, wheezing, rhonchi or rales. Abdominal:      General: Bowel sounds are normal.      Palpations: Abdomen is soft. Musculoskeletal: Normal range of motion. Skin:     General: Skin is warm and dry. Neurological:      General: No focal deficit present. Mental Status: He is alert and oriented to person, place, and time.    Psychiatric:         Behavior: Behavior normal.           DIFFERENTIAL DIAGNOSIS/ MDM:     Urinary frequency shortness of breath tachycardia rule out sepsis rule out Covid will do full work-up    DIAGNOSTIC RESULTS     EKG: All EKG's are interpreted by the Emergency Department Physician who either signs or Co-signs this chart in the absence of a cardiologist.  Sinus tach rate of 143 bpm UT interval is 146 ms QRS durations 94 ms QT corrected 413 ms axis is 194 he has an RSR prime consistent with an incomplete right bundle branch block with RVH Q waves noted in the inferior leads suggestive of an old MI no acute ST elevation or depression shows sinus tachycardia    This is unchanged from prior  RADIOLOGY:   I directly visualized the following  images and reviewed the radiologist interpretations:       EXAMINATION:    ONE XRAY VIEW OF THE CHEST         11/30/2020 1:03 pm         COMPARISON:    June 7, 2018         HISTORY:    ORDERING SYSTEM PROVIDED HISTORY: shortness of breath    TECHNOLOGIST PROVIDED HISTORY:    shortness of breath    Reason for Exam: fever, SOB, urinary frequency    Acuity: Acute    Type of Exam: Initial         FINDINGS:    Cardiomediastinal silhouette appears unchanged.  Normal heart size. Calcified left hilar adenopathy compatible with prior granulomatous disease.     Mild right basilar atelectasis or scarring redemonstrated.  No acute airspace    disease identified.  No definite effusion.  No pneumothorax or    subdiaphragmatic free air.  No acute osseous abnormality identified.              Impression    No radiographic evidence of acute cardiopulmonary disease.                 ED BEDSIDE ULTRASOUND:       LABS:  Labs Reviewed   COMPREHENSIVE METABOLIC PANEL - Abnormal; Notable for the following components:       Result Value    Glucose 197 (*)     Sodium 134 (*)     All other components within normal limits   CBC WITH AUTO DIFFERENTIAL - Abnormal; Notable for the following components:    RDW 15.9 (*)     Platelets 54 (*)     Lymphocytes 8 (*)     Seg Neutrophils 78 (*)     Eosinophils % 0 (*)     Immature Granulocytes 4 (*)     Absolute Lymph # 0.58 (*)     All other components within normal limits   PROTIME-INR - Abnormal; Notable for the following components:    Protime 15.2 (*)     All other components within normal limits   TROPONIN - Abnormal; Notable for the following components:    Troponin, High Sensitivity 27 (*)     All other components within normal limits   URINE RT REFLEX TO CULTURE - Abnormal; Notable for the following components:    Glucose, Ur 3+ (*)     Ketones, Urine 1+ (*)     Urine Hgb 1+ (*)     All other components within normal limits   COVID-19 - Abnormal; Notable for the following components:    SARS-CoV-2, Rapid DETECTED (*)     All other components within normal limits   TROPONIN - Abnormal; Notable for the following components:    Troponin, High Sensitivity 39 (*)     All other components within normal limits   D-DIMER, QUANTITATIVE - Abnormal; Notable for the following components:    D-Dimer, Quant 0.81 (*)     All other components within normal limits   CULTURE, BLOOD 1   CULTURE, BLOOD 1   LACTIC ACID   MICROSCOPIC URINALYSIS   LACTATE DEHYDROGENASE   C-REACTIVE PROTEIN           EMERGENCY DEPARTMENT COURSE:   Vitals:    Vitals:    11/30/20 1900 11/30/20 1915 11/30/20 1917 11/30/20 1921   BP: (!) 91/58  (!) 76/54 (!) 92/57   Pulse: 118 115 116 114   Resp: 17 16 18 22   Temp:       TempSrc:       SpO2: 97% 95% 94% 96%   Weight:         -------------------------  BP: (!) 92/57, Temp: 101.9 °F (38.8 °C), Pulse: 114, Resp: 22        Re-evaluation Notes    Patient is feeling better after Toradol    CRITICAL CARE:   None        CONSULTS:      PROCEDURES:  None    FINAL IMPRESSION      1. Pneumonia due to COVID-19 virus          DISPOSITION/PLAN   DISPOSITION admitted    Condition on Disposition    Stable    PATIENT REFERRED TO:  No follow-up provider specified.     DISCHARGE MEDICATIONS:  New Prescriptions    No medications on file       (Please note that portions of this note were completed

## 2020-12-01 LAB
ABSOLUTE EOS #: <0.03 K/UL (ref 0–0.44)
ABSOLUTE IMMATURE GRANULOCYTE: 0.05 K/UL (ref 0–0.3)
ABSOLUTE LYMPH #: 0.71 K/UL (ref 1.1–3.7)
ABSOLUTE MONO #: 1.18 K/UL (ref 0.1–1.2)
ALBUMIN SERPL-MCNC: 3.4 G/DL (ref 3.5–5.2)
ALBUMIN/GLOBULIN RATIO: 1.2 (ref 1–2.5)
ALP BLD-CCNC: 55 U/L (ref 40–129)
ALT SERPL-CCNC: 27 U/L (ref 5–41)
ANION GAP SERPL CALCULATED.3IONS-SCNC: 14 MMOL/L (ref 9–17)
AST SERPL-CCNC: 19 U/L
BASOPHILS # BLD: 0 % (ref 0–2)
BASOPHILS ABSOLUTE: <0.03 K/UL (ref 0–0.2)
BILIRUB SERPL-MCNC: 0.72 MG/DL (ref 0.3–1.2)
BUN BLDV-MCNC: 19 MG/DL (ref 8–23)
BUN/CREAT BLD: 19 (ref 9–20)
C-REACTIVE PROTEIN: 105.8 MG/L (ref 0–5)
CALCIUM SERPL-MCNC: 8.2 MG/DL (ref 8.6–10.4)
CHLORIDE BLD-SCNC: 100 MMOL/L (ref 98–107)
CO2: 20 MMOL/L (ref 20–31)
CREAT SERPL-MCNC: 1 MG/DL (ref 0.7–1.2)
CULTURE: ABNORMAL
D-DIMER QUANTITATIVE: 0.81 MG/L FEU (ref 0–0.59)
DIFFERENTIAL TYPE: ABNORMAL
EKG ATRIAL RATE: 114 BPM
EKG P AXIS: 62 DEGREES
EKG P-R INTERVAL: 160 MS
EKG Q-T INTERVAL: 320 MS
EKG QRS DURATION: 102 MS
EKG QTC CALCULATION (BAZETT): 441 MS
EKG R AXIS: -163 DEGREES
EKG T AXIS: 60 DEGREES
EKG VENTRICULAR RATE: 114 BPM
EOSINOPHILS RELATIVE PERCENT: 0 % (ref 1–4)
FIBRINOGEN: 460 MG/DL (ref 140–420)
GFR AFRICAN AMERICAN: >60 ML/MIN
GFR NON-AFRICAN AMERICAN: >60 ML/MIN
GFR SERPL CREATININE-BSD FRML MDRD: ABNORMAL ML/MIN/{1.73_M2}
GFR SERPL CREATININE-BSD FRML MDRD: ABNORMAL ML/MIN/{1.73_M2}
GLUCOSE BLD-MCNC: 162 MG/DL (ref 70–99)
GLUCOSE BLD-MCNC: 165 MG/DL (ref 75–110)
GLUCOSE BLD-MCNC: 168 MG/DL (ref 75–110)
GLUCOSE BLD-MCNC: 169 MG/DL (ref 75–110)
GLUCOSE BLD-MCNC: 208 MG/DL (ref 75–110)
HCT VFR BLD CALC: 43.7 % (ref 40.7–50.3)
HEMOGLOBIN: 13.9 G/DL (ref 13–17)
IMMATURE GRANULOCYTES: 1 %
INR BLD: 1.5
LV EF: 65 %
LVEF MODALITY: NORMAL
LYMPHOCYTES # BLD: 8 % (ref 24–43)
Lab: ABNORMAL
MCH RBC QN AUTO: 29.6 PG (ref 25.2–33.5)
MCHC RBC AUTO-ENTMCNC: 31.8 G/DL (ref 25.2–33.5)
MCV RBC AUTO: 93 FL (ref 82.6–102.9)
MONOCYTES # BLD: 13 % (ref 3–12)
NRBC AUTOMATED: 0 PER 100 WBC
PDW BLD-RTO: 16.1 % (ref 11.8–14.4)
PLATELET # BLD: ABNORMAL K/UL (ref 138–453)
PLATELET ESTIMATE: ABNORMAL
PLATELET, FLUORESCENCE: 50 K/UL (ref 138–453)
PLATELET, IMMATURE FRACTION: 9.5 % (ref 1.1–10.3)
PMV BLD AUTO: ABNORMAL FL (ref 8.1–13.5)
POTASSIUM SERPL-SCNC: 4 MMOL/L (ref 3.7–5.3)
PROTHROMBIN TIME: 17.6 SEC (ref 11.5–14.2)
RBC # BLD: 4.7 M/UL (ref 4.21–5.77)
RBC # BLD: ABNORMAL 10*6/UL
SEG NEUTROPHILS: 78 % (ref 36–65)
SEGMENTED NEUTROPHILS ABSOLUTE COUNT: 6.88 K/UL (ref 1.5–8.1)
SODIUM BLD-SCNC: 134 MMOL/L (ref 135–144)
SPECIMEN DESCRIPTION: ABNORMAL
TOTAL PROTEIN: 6.3 G/DL (ref 6.4–8.3)
WBC # BLD: 8.8 K/UL (ref 3.5–11.3)
WBC # BLD: ABNORMAL 10*3/UL

## 2020-12-01 PROCEDURE — 6370000000 HC RX 637 (ALT 250 FOR IP): Performed by: INTERNAL MEDICINE

## 2020-12-01 PROCEDURE — 2580000003 HC RX 258: Performed by: INTERNAL MEDICINE

## 2020-12-01 PROCEDURE — 85384 FIBRINOGEN ACTIVITY: CPT

## 2020-12-01 PROCEDURE — 94640 AIRWAY INHALATION TREATMENT: CPT

## 2020-12-01 PROCEDURE — 2580000003 HC RX 258: Performed by: EMERGENCY MEDICINE

## 2020-12-01 PROCEDURE — 85610 PROTHROMBIN TIME: CPT

## 2020-12-01 PROCEDURE — 85379 FIBRIN DEGRADATION QUANT: CPT

## 2020-12-01 PROCEDURE — 6370000000 HC RX 637 (ALT 250 FOR IP): Performed by: NURSE PRACTITIONER

## 2020-12-01 PROCEDURE — 2700000000 HC OXYGEN THERAPY PER DAY

## 2020-12-01 PROCEDURE — 6360000002 HC RX W HCPCS: Performed by: INTERNAL MEDICINE

## 2020-12-01 PROCEDURE — 2060000000 HC ICU INTERMEDIATE R&B

## 2020-12-01 PROCEDURE — 82947 ASSAY GLUCOSE BLOOD QUANT: CPT

## 2020-12-01 PROCEDURE — 93306 TTE W/DOPPLER COMPLETE: CPT

## 2020-12-01 PROCEDURE — 85025 COMPLETE CBC W/AUTO DIFF WBC: CPT

## 2020-12-01 PROCEDURE — 6360000002 HC RX W HCPCS: Performed by: NURSE PRACTITIONER

## 2020-12-01 PROCEDURE — 85055 RETICULATED PLATELET ASSAY: CPT

## 2020-12-01 PROCEDURE — 99222 1ST HOSP IP/OBS MODERATE 55: CPT | Performed by: INTERNAL MEDICINE

## 2020-12-01 PROCEDURE — 36415 COLL VENOUS BLD VENIPUNCTURE: CPT

## 2020-12-01 PROCEDURE — 2580000003 HC RX 258: Performed by: NURSE PRACTITIONER

## 2020-12-01 PROCEDURE — 2500000003 HC RX 250 WO HCPCS: Performed by: EMERGENCY MEDICINE

## 2020-12-01 PROCEDURE — 94660 CPAP INITIATION&MGMT: CPT

## 2020-12-01 PROCEDURE — 80053 COMPREHEN METABOLIC PANEL: CPT

## 2020-12-01 PROCEDURE — 94761 N-INVAS EAR/PLS OXIMETRY MLT: CPT

## 2020-12-01 RX ORDER — AZITHROMYCIN 250 MG/1
500 TABLET, FILM COATED ORAL NIGHTLY
Status: DISCONTINUED | OUTPATIENT
Start: 2020-12-01 | End: 2020-12-03 | Stop reason: HOSPADM

## 2020-12-01 RX ORDER — ALBUTEROL SULFATE 90 UG/1
AEROSOL, METERED RESPIRATORY (INHALATION)
Status: DISPENSED
Start: 2020-12-01 | End: 2020-12-01

## 2020-12-01 RX ADMIN — INSULIN LISPRO 1 UNITS: 100 INJECTION, SOLUTION INTRAVENOUS; SUBCUTANEOUS at 21:37

## 2020-12-01 RX ADMIN — GABAPENTIN 200 MG: 100 CAPSULE ORAL at 20:25

## 2020-12-01 RX ADMIN — SODIUM CHLORIDE: 9 INJECTION, SOLUTION INTRAVENOUS at 20:29

## 2020-12-01 RX ADMIN — INSULIN LISPRO 2 UNITS: 100 INJECTION, SOLUTION INTRAVENOUS; SUBCUTANEOUS at 12:42

## 2020-12-01 RX ADMIN — INSULIN GLARGINE 30 UNITS: 100 INJECTION, SOLUTION SUBCUTANEOUS at 08:08

## 2020-12-01 RX ADMIN — ALBUTEROL SULFATE 2 PUFF: 90 AEROSOL, METERED RESPIRATORY (INHALATION) at 15:54

## 2020-12-01 RX ADMIN — ATORVASTATIN CALCIUM 40 MG: 40 TABLET, FILM COATED ORAL at 08:31

## 2020-12-01 RX ADMIN — OXYCODONE AND ACETAMINOPHEN 1 TABLET: 5; 325 TABLET ORAL at 21:18

## 2020-12-01 RX ADMIN — CILOSTAZOL 100 MG: 100 TABLET ORAL at 08:31

## 2020-12-01 RX ADMIN — OXYCODONE AND ACETAMINOPHEN 1 TABLET: 5; 325 TABLET ORAL at 05:04

## 2020-12-01 RX ADMIN — INSULIN LISPRO 2 UNITS: 100 INJECTION, SOLUTION INTRAVENOUS; SUBCUTANEOUS at 08:09

## 2020-12-01 RX ADMIN — APIXABAN 5 MG: 2.5 TABLET, FILM COATED ORAL at 20:25

## 2020-12-01 RX ADMIN — AZITHROMYCIN MONOHYDRATE 500 MG: 250 TABLET ORAL at 20:24

## 2020-12-01 RX ADMIN — INSULIN LISPRO 8 UNITS: 100 INJECTION, SOLUTION INTRAVENOUS; SUBCUTANEOUS at 18:40

## 2020-12-01 RX ADMIN — TAMSULOSIN HYDROCHLORIDE 0.4 MG: 0.4 CAPSULE ORAL at 08:30

## 2020-12-01 RX ADMIN — DILTIAZEM HYDROCHLORIDE 120 MG: 120 CAPSULE, COATED, EXTENDED RELEASE ORAL at 08:31

## 2020-12-01 RX ADMIN — REMDESIVIR 200 MG: 100 INJECTION, POWDER, LYOPHILIZED, FOR SOLUTION INTRAVENOUS at 00:47

## 2020-12-01 RX ADMIN — BUDESONIDE AND FORMOTEROL FUMARATE DIHYDRATE 2 PUFF: 160; 4.5 AEROSOL RESPIRATORY (INHALATION) at 20:16

## 2020-12-01 RX ADMIN — REMDESIVIR 100 MG: 100 INJECTION, POWDER, LYOPHILIZED, FOR SOLUTION INTRAVENOUS at 21:20

## 2020-12-01 RX ADMIN — CEFTRIAXONE 1 G: 1 INJECTION, POWDER, FOR SOLUTION INTRAMUSCULAR; INTRAVENOUS at 15:36

## 2020-12-01 RX ADMIN — VANCOMYCIN HYDROCHLORIDE 1750 MG: 1 INJECTION, POWDER, LYOPHILIZED, FOR SOLUTION INTRAVENOUS at 22:43

## 2020-12-01 RX ADMIN — SODIUM CHLORIDE, PRESERVATIVE FREE 10 ML: 5 INJECTION INTRAVENOUS at 20:25

## 2020-12-01 RX ADMIN — ALBUTEROL SULFATE 2 PUFF: 90 AEROSOL, METERED RESPIRATORY (INHALATION) at 20:16

## 2020-12-01 RX ADMIN — OXYCODONE AND ACETAMINOPHEN 1 TABLET: 5; 325 TABLET ORAL at 11:15

## 2020-12-01 RX ADMIN — INSULIN LISPRO 4 UNITS: 100 INJECTION, SOLUTION INTRAVENOUS; SUBCUTANEOUS at 18:39

## 2020-12-01 RX ADMIN — ALBUTEROL SULFATE 2 PUFF: 90 AEROSOL, METERED RESPIRATORY (INHALATION) at 11:36

## 2020-12-01 RX ADMIN — INSULIN GLARGINE 30 UNITS: 100 INJECTION, SOLUTION SUBCUTANEOUS at 21:37

## 2020-12-01 RX ADMIN — ALBUTEROL SULFATE 2 PUFF: 90 AEROSOL, METERED RESPIRATORY (INHALATION) at 08:50

## 2020-12-01 RX ADMIN — CETIRIZINE HYDROCHLORIDE 10 MG: 5 TABLET, FILM COATED ORAL at 08:31

## 2020-12-01 RX ADMIN — ACETAMINOPHEN 650 MG: 325 TABLET ORAL at 03:05

## 2020-12-01 RX ADMIN — SODIUM CHLORIDE: 9 INJECTION, SOLUTION INTRAVENOUS at 04:16

## 2020-12-01 RX ADMIN — INSULIN LISPRO 8 UNITS: 100 INJECTION, SOLUTION INTRAVENOUS; SUBCUTANEOUS at 12:43

## 2020-12-01 RX ADMIN — APIXABAN 5 MG: 2.5 TABLET, FILM COATED ORAL at 08:31

## 2020-12-01 RX ADMIN — CILOSTAZOL 100 MG: 100 TABLET ORAL at 20:25

## 2020-12-01 RX ADMIN — GABAPENTIN 200 MG: 100 CAPSULE ORAL at 08:30

## 2020-12-01 RX ADMIN — INSULIN LISPRO 8 UNITS: 100 INJECTION, SOLUTION INTRAVENOUS; SUBCUTANEOUS at 08:09

## 2020-12-01 RX ADMIN — CHOLECALCIFEROL TAB 25 MCG (1000 UNIT) 2000 UNITS: 25 TAB at 08:30

## 2020-12-01 RX ADMIN — ALBUTEROL SULFATE 2 PUFF: 90 AEROSOL, METERED RESPIRATORY (INHALATION) at 04:38

## 2020-12-01 RX ADMIN — FINASTERIDE 5 MG: 5 TABLET, FILM COATED ORAL at 08:31

## 2020-12-01 RX ADMIN — SODIUM CHLORIDE: 9 INJECTION, SOLUTION INTRAVENOUS at 11:15

## 2020-12-01 RX ADMIN — VANCOMYCIN HYDROCHLORIDE 1750 MG: 1 INJECTION, POWDER, LYOPHILIZED, FOR SOLUTION INTRAVENOUS at 11:15

## 2020-12-01 RX ADMIN — ALBUTEROL SULFATE 2 PUFF: 90 AEROSOL, METERED RESPIRATORY (INHALATION) at 00:18

## 2020-12-01 RX ADMIN — OXYCODONE AND ACETAMINOPHEN 1 TABLET: 5; 325 TABLET ORAL at 15:41

## 2020-12-01 ASSESSMENT — PAIN DESCRIPTION - DESCRIPTORS: DESCRIPTORS: ACHING

## 2020-12-01 ASSESSMENT — PAIN DESCRIPTION - LOCATION: LOCATION: SHOULDER

## 2020-12-01 ASSESSMENT — PAIN SCALES - GENERAL
PAINLEVEL_OUTOF10: 3
PAINLEVEL_OUTOF10: 6
PAINLEVEL_OUTOF10: 6
PAINLEVEL_OUTOF10: 5
PAINLEVEL_OUTOF10: 5
PAINLEVEL_OUTOF10: 6
PAINLEVEL_OUTOF10: 6

## 2020-12-01 ASSESSMENT — PAIN DESCRIPTION - PAIN TYPE: TYPE: CHRONIC PAIN

## 2020-12-01 NOTE — H&P
HOSPITALIST ADMISSION H&P      REASON FOR ADMISSION:  Covid-19 pneumonia, hypoxia  ESTIMATED LENGTH OF STAY:>2 midnights, 3-4 days    ATTENDING/ADMITTING PHYSICIAN: Papo Kidd MD/KILEY Chen MD  PCP: Kelly Nunez    HISTORY OF PRESENT ILLNESS:      The patient is a 81 y.o. male patient of Kelly Nunez who presents from the ER with c/o fevers, chills, and dyspnea on exertion for the past 1 day. He denies cough, chest pain, change in sense of taste or smell, rash, vomiting, diarrhea, or sore throat. He is employed as a radiologic technologist. He has factor V leiden mutation, is on Eliquis BID, and has a remote history of DVT and PE. He also c/o right shoulder to neck myalgias and states he saw his chiropractor today without improvement in his symptoms, no known trauma/injury. He describes the pain as a constant ache, worse with use of the shoulder or turning of the neck, non-radiating, and moderate in intensity. He is a previous smoker. In ER, he tested positive for Covid-19, was febrile with temperature maximum 101.9F, tachycardic with HR in the 140's, and hypoxic at 89% on room air. CT of the chest impression: 1. No large or central pulmonary embolism. 2. Scattered areas of atelectasis, scarring, or pneumonia within both lungs. However, the imaging features are not typical of COVID-19 pneumonia. 3. Moderate emphysema. D. Dimer 0.81, troponin 27 -->39. Lactic acid 2.2 --> 1.4, so sepsis was ruled out 11/30/2020. DMII with hyperglycemia -- HgbA1C 8.9 -- recently started on trulicity, has been on farxiga as well -- takes 28 units of levemir in the AM and 27 units at HS at home -- takes humalog 8 units TID with meals    Started wellbutrin  mg BID in October and recently had his neurontin dose increased to 200 mg BID. JANET with cpap    Hypertension -- labile     See below for additional PMH.     Patient [id number] records reviewed, including, but not limited to ER records, imaging results, lab results, office records, personal records, and OARRS -- no signs of abuse or diversion. Past Medical History:   Diagnosis Date    Acute prostatitis 12/16/2019    Asthma     Balanitis 4/18/2017    BPH (benign prostatic hyperplasia)     Cellulitis of right foot 12/16/2019    Chest pain 4/9/2008    History of prostatitis     History of pulmonary embolism     History of tobacco abuse     Hypertension     JANET on CPAP     Type 2 diabetes mellitus ([address]     Urinary tract infectious disease 12/16/2019           Past Surgical History:   Procedure Laterality Date    COLONOSCOPY      COLONOSCOPY  08/03/2018    [id number]    ELBOW SURGERY Right     HERNIA REPAIR  0317    UMBILICAL    JOINT REPLACEMENT Right     total knee    KNEE ARTHROSCOPY Right     VENA CAVA FILTER PLACEMENT  2012    WRIST SURGERY Right        Medications Prior to Admission:    Medications Prior to Admission: Dulaglutide (TRULICITY) 1.5 NN/8.0CK SOPN, Inject 1.5 mg into the skin once a week  gabapentin (NEURONTIN) 100 MG capsule, Take 100 mg by mouth 2 times daily. buPROPion (WELLBUTRIN) 100 MG tablet, Take by mouth 2 times daily  glipiZIDE (GLUCOTROL XL) 10 MG extended release tablet, TAKE 1 TABLET BY MOUTH TWICE A DAY  insulin detemir (LEVEMIR FLEXTOUCH) 100 UNIT/ML injection pen, 50 Units daily  diltiazem (CARDIZEM CD) 120 MG extended release capsule, TAKE 1 CAPSULE BY MOUTH EVERY DAY  finasteride (PROSCAR) 5 MG tablet, TAKE 1 TABLET BY MOUTH EVERY DAY  fluticasone-salmeterol (ADVAIR DISKUS) 250-50 MCG/DOSE AEPB, INHALE 1 PUFF INTO THE LUNGS 2 (TWO) TIMES DAILY.   atorvastatin (LIPITOR) 40 MG tablet, TAKE 1 TABLET BY MOUTH EVERY DAY  FARXIGA 10 MG tablet,   cilostazol (PLETAL) 100 MG tablet, TAKE 1 TABLET BY MOUTH TWICE A DAY  insulin lispro (HUMALOG) 100 UNIT/ML injection vial, Inject 5 Units into the skin 3 times daily (before meals)  lisinopril (PRINIVIL;ZESTRIL) 20 MG tablet, Take 20 mg by mouth Once in dialysis. Cholecalciferol (VITAMIN D3) 2000 UNITS CAPS, Take  by mouth. TESTOSTERONE IM, Inject 1 mL into the muscle. 2 times a month  Mometasone Furo-Formoterol Fum (DULERA IN), Inhale  into the lungs daily. tamsulosin (FLOMAX) 0.4 MG capsule, Take 0.4 mg by mouth daily. albuterol (PROVENTIL HFA;VENTOLIN HFA) 108 (90 BASE) MCG/ACT inhaler, Inhale 2 puffs into the lungs every 6 hours as needed. triamcinolone acetonide (KENALOG) 0.1 % paste, USE AS DIRECTED 1 TREATMENT IN THE MOUTH OR THROAT 2 (TWO) TIMES DAILY. diclofenac sodium 1 % GEL, Apply 1 gram to affected joint bid  sildenafil (VIAGRA) 100 MG tablet,   FISH OIL, by Does not apply route.  loratadine (CLARITIN) 10 MG tablet, Take 10 mg by mouth daily. Allergies:    Amoxicillin    Social History:    reports that he quit smoking about 13 years ago. He quit smokeless tobacco use about 13 years ago. He reports current alcohol use. He reports that he does not use drugs. Family History:   family history includes Cancer in his mother; Diabetes in his father; Kidney Disease in his father. REVIEW OF SYSTEMS:  See HPI and problem list; otherwise no other new complaints with respect to eyes, ENT, neck, pulmonary, coronary, chest, GI, , endocrine, musculoskeletal, immune system/connective tissue disease, hematologic, neurologic, psychiatric, skin, lymphatics, or malignancies. Code status discussed with patient/family--wishes for Full Code at this time.     PHYSICAL EXAM:  Vitals:  /65   Pulse 103   Temp 97.2 °F (36.2 °C) (Oral)   Resp 13   Ht 6' 1\" (1.854 m)   Wt 281 lb 8 oz (127.7 kg)   SpO2 90%   BMI 37.14 kg/m²     General: awake, alert and cooperative  HEENT: PERRLA, EMOI, External nose normal, Normocephalic and Atraumatic  Neck: right neck/superior shoulder musculature with tenderness present to palpation, Carotid Pulses Present and No Lymphadenopathy  Chest/Lungs: with MURRIETA, Prolonged Expiratory Phase and Distant Breath Sounds  Cardiac: regular rhythm, tachycardic rate and Pedal Pulses Palpable Bilaterally  GI/Abdomen: rounded, Bowel Sounds Present and Soft, Non-tender, without Guarding or Rebound Tenderness  : Not examined  Extremities/Musculoskeletal: BLE with trace pedal edema  Skin: No Cyanosis, No rash and Skin warm and dry  Neuro: Alert and Oriented, to Person, to Time, to Place, to Situation and No Localizing Signs/Symptoms  Psychiatric: Normal mood and affect      LABS:    CBC with Differential:    Lab Results   Component Value Date    WBC 7.3 11/30/2020    RBC 5.38 11/30/2020    HGB 16.1 11/30/2020    HCT 49.5 11/30/2020    PLT 54 11/30/2020    MCV 92.0 11/30/2020    MCH 29.9 11/30/2020    MCHC 32.5 11/30/2020    RDW 15.9 11/30/2020    LYMPHOPCT 8 11/30/2020    MONOPCT 9 11/30/2020    BASOPCT 0 11/30/2020    MONOSABS 0.66 11/30/2020    LYMPHSABS 0.58 11/30/2020    EOSABS 0.00 11/30/2020    BASOSABS 0.00 11/30/2020    DIFFTYPE NOT REPORTED 11/30/2020     CMP:    Lab Results   Component Value Date     11/30/2020    K 4.6 11/30/2020    CL 98 11/30/2020    CO2 22 11/30/2020    BUN 19 11/30/2020    CREATININE 1.01 11/30/2020    GFRAA >60 11/30/2020    LABGLOM >60 11/30/2020    GLUCOSE 197 11/30/2020    PROT 7.6 11/30/2020    LABALBU 4.1 11/30/2020    CALCIUM 9.1 11/30/2020    BILITOT 0.94 11/30/2020    ALKPHOS 72 11/30/2020    AST 22 11/30/2020    ALT 31 11/30/2020       ASSESSMENT:      Patient Active Problem List   Diagnosis    S/P IVC filter    Hypercoagulable state ([facility]    DVT (deep venous thrombosis) (HCC)    Pulmonary embolism (HCC)    History of tobacco abuse    History of prostatitis    BPH (benign prostatic hyperplasia)    Asthma    JANET on CPAP    Type 2 diabetes mellitus (HCC)    Hypertension    History of pulmonary embolism    Pneumonia due to COVID-19 virus    Essential hypertension    Factor V Leiden mutation ([facility] [address]    Hyperlipidemia    [facility]ipheral artery [facility]) [facility]    Testicular hypofunction    Other emphysema (HCC)    Fatty liver    Hypoxia    Major depressive disorder    Lichen planus       PLAN:    1. Covid-19 pneumonia with hypoxia -- telemetry monitoring, IV antibiotics, RT protocols, supplemental oxygen prn, inhalers, prone positioning, IV remdesivir, supportive care, monitor  2. Elevated lactic acid -- resolved with IV hydration  3. Prophylactic anticoagulation -- con't home eliquis -- SCDs  4. DMII with hyperglycemia -- carb controlled diet, insulins, monitor and titrate prn  5. Right shoulder/neck myalgias -- ice/heat, pain control prn, monitor  6. JANET -- home cpap at HS/rest  7. Hypertension -- blood pressure somewhat labile -- con't home medications and monitor  8. Home medications reviewed  9.  See orders     Note that over 50 minutes was spent in evaluation of the patient, review of the chart and pertinent records, discussion with family/staff, etc    Gris HONEYCUTT, NPMoriahC, FNP-BC  10:11 PM  11/30/2020

## 2020-12-01 NOTE — ED NOTES
Laying on stretcher with eyes closed, awakens easily to name, denies any c/o, states \"just feel lousy\". Resp easy, even, non labored, no distress noted. Call light in reach.       Vannessa Love RN  11/30/20 1936

## 2020-12-01 NOTE — ACP (ADVANCE CARE PLANNING)
Advance Care Planning     Advance Care Planning Activator (Inpatient)  Conversation Note      Date of ACP Conversation: 11/30/2020    Conversation Conducted with: Patient with Decision Making Capacity    ACP Activator: Jennifer Yang    *When Decision Maker makes decisions on behalf of the incapacitated patient: Decision Maker is asked to consider and make decisions based on patient values, known preferences, or best interests. Health Care Decision Maker:     Current Designated Health Care Decision Maker:   (If there is a valid [place] named in the [facility] Makers\" box in the ACP activity, but it is not visible above, be sure to open that field and then select the health care decision maker relationship (ie \"primary\") in the blank space to the right of the name.) Validate  this information as still accurate & up-to-date; edit [place] field as needed.)    Note: Assess and validate information in current ACP documents, as indicated. If no Decision Maker listed above or available through scanned documents, then:    If no Authorized Decision Maker has previously been identified, then patient chooses [place]:  \"Who would you like to name as your primary health care decision-maker? \"               Name:   Latonia Lombardi   [phone] [facility] [phone]     Esperanza Roberts this person be reached easily? \" Yes  \"Who would you like to name as your back-up decision maker? \"   Name:  Bertin Schwab   [phone] [facility]  [phone]     Esperanza Roberts this person be reached easily? \" Yes    Note: If the relationship of these Decision-Makers to the patient does NOT follow your state's Next of Kin hierarchy, recommend that patient complete ACP document that meets state-specific requirements to allow them to act on the patient's behalf when appropriate. Care Preferences    Ventilation:   \"If you were in your present state of health and suddenly became very ill and were unable to breathe on your own, what would your preference be about the use of a ventilator (breathing machine) if it were available to you? \"      Would the patient desire the use of ventilator (breathing machine)?: yes    \"If your health worsens and it becomes clear that your chance of recovery is unlikely, what would your preference be about the use of a ventilator (breathing machine) if it were available to you? \"     Would the patient desire the use of ventilator (breathing machine)?: Yes      Resuscitation  \"CPR works best to restart the heart when there is a sudden event, like a heart attack, in someone who is otherwise healthy. Unfortunately, CPR does not typically restart the heart for people who have serious health conditions or who are very sick. \"    \"In the event your heart stopped as a result of an underlying serious health condition, would you want attempts to be made to restart your heart (answer \"yes\" for attempt to resuscitate) or would you prefer a natural death (answer \"no\" for do not attempt to resuscitate)? \" yes      NOTE: If the patient has a valid advance directive AND now provides care preference(s) that are inconsistent with that prior directive, advise the patient to consider either: creating a new advance directive that complies with state-specific requirements; or, if that is not possible, orally revoking that prior directive in accordance with state-specific requirements, which must be documented in the EHR. [] Yes   [x] No   Educated Patient / Donato Public regarding differences between Advance Directives and portable DNR orders.     Length of ACP Conversation in minutes:      Conversation Outcomes:  [x] ACP discussion completed  [] Existing advance directive reviewed with patient; no changes to patient's previously recorded wishes  [] New Advance Directive completed  [] Portable Do Not Rescitate prepared for Provider review and signature  [] POLST/POST/MOLST/MOST prepared

## 2020-12-02 ENCOUNTER — APPOINTMENT (OUTPATIENT)
Dept: GENERAL RADIOLOGY | Age: 70
DRG: 987 | End: 2020-12-02
Payer: COMMERCIAL

## 2020-12-02 LAB
ABSOLUTE EOS #: 0.16 K/UL (ref 0–0.44)
ABSOLUTE IMMATURE GRANULOCYTE: 0.03 K/UL (ref 0–0.3)
ABSOLUTE LYMPH #: 0.98 K/UL (ref 1.1–3.7)
ABSOLUTE MONO #: 1.16 K/UL (ref 0.1–1.2)
ALBUMIN SERPL-MCNC: 3.5 G/DL (ref 3.5–5.2)
ALBUMIN/GLOBULIN RATIO: 1.1 (ref 1–2.5)
ALP BLD-CCNC: 57 U/L (ref 40–129)
ALT SERPL-CCNC: 36 U/L (ref 5–41)
ANION GAP SERPL CALCULATED.3IONS-SCNC: 11 MMOL/L (ref 9–17)
AST SERPL-CCNC: 24 U/L
BASOPHILS # BLD: 0 % (ref 0–2)
BASOPHILS ABSOLUTE: <0.03 K/UL (ref 0–0.2)
BILIRUB SERPL-MCNC: 0.56 MG/DL (ref 0.3–1.2)
BUN BLDV-MCNC: 15 MG/DL (ref 8–23)
BUN/CREAT BLD: 20 (ref 9–20)
CALCIUM SERPL-MCNC: 8.6 MG/DL (ref 8.6–10.4)
CHLORIDE BLD-SCNC: 103 MMOL/L (ref 98–107)
CO2: 22 MMOL/L (ref 20–31)
CREAT SERPL-MCNC: 0.76 MG/DL (ref 0.7–1.2)
CULTURE: ABNORMAL
D-DIMER QUANTITATIVE: 0.77 MG/L FEU (ref 0–0.59)
DIFFERENTIAL TYPE: ABNORMAL
EOSINOPHILS RELATIVE PERCENT: 3 % (ref 1–4)
FERRITIN: 442 UG/L (ref 30–400)
FIBRINOGEN: 612 MG/DL (ref 140–420)
GFR AFRICAN AMERICAN: >60 ML/MIN
GFR NON-AFRICAN AMERICAN: >60 ML/MIN
GFR SERPL CREATININE-BSD FRML MDRD: ABNORMAL ML/MIN/{1.73_M2}
GFR SERPL CREATININE-BSD FRML MDRD: ABNORMAL ML/MIN/{1.73_M2}
GLUCOSE BLD-MCNC: 167 MG/DL (ref 75–110)
GLUCOSE BLD-MCNC: 169 MG/DL (ref 70–99)
GLUCOSE BLD-MCNC: 201 MG/DL (ref 75–110)
GLUCOSE BLD-MCNC: 201 MG/DL (ref 75–110)
HCT VFR BLD CALC: 44.9 % (ref 40.7–50.3)
HEMOGLOBIN: 14.5 G/DL (ref 13–17)
IMMATURE GRANULOCYTES: 1 %
INR BLD: 1.7
LYMPHOCYTES # BLD: 16 % (ref 24–43)
Lab: ABNORMAL
MCH RBC QN AUTO: 30 PG (ref 25.2–33.5)
MCHC RBC AUTO-ENTMCNC: 32.3 G/DL (ref 25.2–33.5)
MCV RBC AUTO: 93 FL (ref 82.6–102.9)
MONOCYTES # BLD: 19 % (ref 3–12)
NRBC AUTOMATED: 0 PER 100 WBC
PARTIAL THROMBOPLASTIN TIME: 47.8 SEC (ref 23.9–33.8)
PDW BLD-RTO: 16.1 % (ref 11.8–14.4)
PLATELET # BLD: ABNORMAL K/UL (ref 138–453)
PLATELET ESTIMATE: ABNORMAL
PLATELET, FLUORESCENCE: 50 K/UL (ref 138–453)
PLATELET, IMMATURE FRACTION: 12.3 % (ref 1.1–10.3)
PMV BLD AUTO: ABNORMAL FL (ref 8.1–13.5)
POTASSIUM SERPL-SCNC: 4 MMOL/L (ref 3.7–5.3)
PROTHROMBIN TIME: 19.5 SEC (ref 11.5–14.2)
RBC # BLD: 4.83 M/UL (ref 4.21–5.77)
RBC # BLD: ABNORMAL 10*6/UL
SEG NEUTROPHILS: 62 % (ref 36–65)
SEGMENTED NEUTROPHILS ABSOLUTE COUNT: 3.74 K/UL (ref 1.5–8.1)
SODIUM BLD-SCNC: 136 MMOL/L (ref 135–144)
SPECIMEN DESCRIPTION: ABNORMAL
TOTAL PROTEIN: 6.8 G/DL (ref 6.4–8.3)
VANCOMYCIN TROUGH DATE LAST DOSE: ABNORMAL
VANCOMYCIN TROUGH DOSE AMOUNT: ABNORMAL
VANCOMYCIN TROUGH TIME LAST DOSE: ABNORMAL
VANCOMYCIN TROUGH: 9.4 UG/ML (ref 10–20)
WBC # BLD: 6.1 K/UL (ref 3.5–11.3)
WBC # BLD: ABNORMAL 10*3/UL

## 2020-12-02 PROCEDURE — 36415 COLL VENOUS BLD VENIPUNCTURE: CPT

## 2020-12-02 PROCEDURE — 85730 THROMBOPLASTIN TIME PARTIAL: CPT

## 2020-12-02 PROCEDURE — 2580000003 HC RX 258: Performed by: INTERNAL MEDICINE

## 2020-12-02 PROCEDURE — 85025 COMPLETE CBC W/AUTO DIFF WBC: CPT

## 2020-12-02 PROCEDURE — 6360000002 HC RX W HCPCS: Performed by: INTERNAL MEDICINE

## 2020-12-02 PROCEDURE — 6370000000 HC RX 637 (ALT 250 FOR IP): Performed by: NURSE PRACTITIONER

## 2020-12-02 PROCEDURE — 85055 RETICULATED PLATELET ASSAY: CPT

## 2020-12-02 PROCEDURE — 99252 IP/OBS CONSLTJ NEW/EST SF 35: CPT | Performed by: SURGERY

## 2020-12-02 PROCEDURE — 82947 ASSAY GLUCOSE BLOOD QUANT: CPT

## 2020-12-02 PROCEDURE — 2580000003 HC RX 258: Performed by: EMERGENCY MEDICINE

## 2020-12-02 PROCEDURE — 2060000000 HC ICU INTERMEDIATE R&B

## 2020-12-02 PROCEDURE — 94761 N-INVAS EAR/PLS OXIMETRY MLT: CPT

## 2020-12-02 PROCEDURE — 2500000003 HC RX 250 WO HCPCS: Performed by: EMERGENCY MEDICINE

## 2020-12-02 PROCEDURE — 0J980ZZ DRAINAGE OF ABDOMEN SUBCUTANEOUS TISSUE AND FASCIA, OPEN APPROACH: ICD-10-PCS | Performed by: SURGERY

## 2020-12-02 PROCEDURE — 80053 COMPREHEN METABOLIC PANEL: CPT

## 2020-12-02 PROCEDURE — 6360000002 HC RX W HCPCS: Performed by: NURSE PRACTITIONER

## 2020-12-02 PROCEDURE — 80202 ASSAY OF VANCOMYCIN: CPT

## 2020-12-02 PROCEDURE — 85379 FIBRIN DEGRADATION QUANT: CPT

## 2020-12-02 PROCEDURE — 10061 I&D ABSCESS COMP/MULTIPLE: CPT | Performed by: SURGERY

## 2020-12-02 PROCEDURE — 6370000000 HC RX 637 (ALT 250 FOR IP): Performed by: INTERNAL MEDICINE

## 2020-12-02 PROCEDURE — 85384 FIBRINOGEN ACTIVITY: CPT

## 2020-12-02 PROCEDURE — 2700000000 HC OXYGEN THERAPY PER DAY

## 2020-12-02 PROCEDURE — 2500000003 HC RX 250 WO HCPCS

## 2020-12-02 PROCEDURE — 71045 X-RAY EXAM CHEST 1 VIEW: CPT

## 2020-12-02 PROCEDURE — 85610 PROTHROMBIN TIME: CPT

## 2020-12-02 PROCEDURE — 99231 SBSQ HOSP IP/OBS SF/LOW 25: CPT | Performed by: INTERNAL MEDICINE

## 2020-12-02 PROCEDURE — 97161 PT EVAL LOW COMPLEX 20 MIN: CPT | Performed by: PHYSICAL THERAPIST

## 2020-12-02 PROCEDURE — 94640 AIRWAY INHALATION TREATMENT: CPT

## 2020-12-02 PROCEDURE — 97165 OT EVAL LOW COMPLEX 30 MIN: CPT | Performed by: OCCUPATIONAL THERAPIST

## 2020-12-02 PROCEDURE — 97116 GAIT TRAINING THERAPY: CPT | Performed by: PHYSICAL THERAPIST

## 2020-12-02 PROCEDURE — 2580000003 HC RX 258: Performed by: NURSE PRACTITIONER

## 2020-12-02 RX ORDER — INSULIN GLARGINE 100 [IU]/ML
35 INJECTION, SOLUTION SUBCUTANEOUS DAILY
Status: DISCONTINUED | OUTPATIENT
Start: 2020-12-02 | End: 2020-12-03

## 2020-12-02 RX ORDER — METAXALONE 800 MG/1
800 TABLET ORAL 3 TIMES DAILY
Status: DISCONTINUED | OUTPATIENT
Start: 2020-12-02 | End: 2020-12-03 | Stop reason: HOSPADM

## 2020-12-02 RX ORDER — LIDOCAINE HYDROCHLORIDE 10 MG/ML
INJECTION, SOLUTION INFILTRATION; PERINEURAL
Status: COMPLETED
Start: 2020-12-02 | End: 2020-12-02

## 2020-12-02 RX ADMIN — ATORVASTATIN CALCIUM 40 MG: 40 TABLET, FILM COATED ORAL at 09:26

## 2020-12-02 RX ADMIN — APIXABAN 5 MG: 2.5 TABLET, FILM COATED ORAL at 09:26

## 2020-12-02 RX ADMIN — GABAPENTIN 200 MG: 100 CAPSULE ORAL at 09:26

## 2020-12-02 RX ADMIN — SODIUM CHLORIDE: 9 INJECTION, SOLUTION INTRAVENOUS at 22:12

## 2020-12-02 RX ADMIN — AZITHROMYCIN MONOHYDRATE 500 MG: 250 TABLET ORAL at 21:12

## 2020-12-02 RX ADMIN — INSULIN LISPRO 1 UNITS: 100 INJECTION, SOLUTION INTRAVENOUS; SUBCUTANEOUS at 21:10

## 2020-12-02 RX ADMIN — INSULIN LISPRO 4 UNITS: 100 INJECTION, SOLUTION INTRAVENOUS; SUBCUTANEOUS at 12:58

## 2020-12-02 RX ADMIN — ALBUTEROL SULFATE 2 PUFF: 90 AEROSOL, METERED RESPIRATORY (INHALATION) at 19:57

## 2020-12-02 RX ADMIN — BUDESONIDE AND FORMOTEROL FUMARATE DIHYDRATE 2 PUFF: 160; 4.5 AEROSOL RESPIRATORY (INHALATION) at 07:41

## 2020-12-02 RX ADMIN — ALBUTEROL SULFATE 2 PUFF: 90 AEROSOL, METERED RESPIRATORY (INHALATION) at 15:44

## 2020-12-02 RX ADMIN — CHOLECALCIFEROL TAB 25 MCG (1000 UNIT) 2000 UNITS: 25 TAB at 09:26

## 2020-12-02 RX ADMIN — APIXABAN 5 MG: 2.5 TABLET, FILM COATED ORAL at 21:11

## 2020-12-02 RX ADMIN — INSULIN LISPRO 4 UNITS: 100 INJECTION, SOLUTION INTRAVENOUS; SUBCUTANEOUS at 18:14

## 2020-12-02 RX ADMIN — OXYCODONE AND ACETAMINOPHEN 1 TABLET: 5; 325 TABLET ORAL at 13:18

## 2020-12-02 RX ADMIN — ALBUTEROL SULFATE 2 PUFF: 90 AEROSOL, METERED RESPIRATORY (INHALATION) at 00:29

## 2020-12-02 RX ADMIN — SODIUM CHLORIDE, PRESERVATIVE FREE 10 ML: 5 INJECTION INTRAVENOUS at 21:16

## 2020-12-02 RX ADMIN — FINASTERIDE 5 MG: 5 TABLET, FILM COATED ORAL at 09:29

## 2020-12-02 RX ADMIN — INSULIN LISPRO 2 UNITS: 100 INJECTION, SOLUTION INTRAVENOUS; SUBCUTANEOUS at 09:30

## 2020-12-02 RX ADMIN — TAMSULOSIN HYDROCHLORIDE 0.4 MG: 0.4 CAPSULE ORAL at 09:26

## 2020-12-02 RX ADMIN — OXYCODONE AND ACETAMINOPHEN 1 TABLET: 5; 325 TABLET ORAL at 21:57

## 2020-12-02 RX ADMIN — VANCOMYCIN HYDROCHLORIDE 1750 MG: 1 INJECTION, POWDER, LYOPHILIZED, FOR SOLUTION INTRAVENOUS at 22:58

## 2020-12-02 RX ADMIN — OXYCODONE AND ACETAMINOPHEN 1 TABLET: 5; 325 TABLET ORAL at 01:41

## 2020-12-02 RX ADMIN — ALBUTEROL SULFATE 2 PUFF: 90 AEROSOL, METERED RESPIRATORY (INHALATION) at 23:14

## 2020-12-02 RX ADMIN — CEFTRIAXONE 1 G: 1 INJECTION, POWDER, FOR SOLUTION INTRAMUSCULAR; INTRAVENOUS at 14:59

## 2020-12-02 RX ADMIN — CILOSTAZOL 100 MG: 100 TABLET ORAL at 09:26

## 2020-12-02 RX ADMIN — SODIUM CHLORIDE: 9 INJECTION, SOLUTION INTRAVENOUS at 05:51

## 2020-12-02 RX ADMIN — INSULIN LISPRO 10 UNITS: 100 INJECTION, SOLUTION INTRAVENOUS; SUBCUTANEOUS at 09:30

## 2020-12-02 RX ADMIN — CETIRIZINE HYDROCHLORIDE 10 MG: 5 TABLET, FILM COATED ORAL at 09:26

## 2020-12-02 RX ADMIN — DILTIAZEM HYDROCHLORIDE 120 MG: 120 CAPSULE, COATED, EXTENDED RELEASE ORAL at 09:29

## 2020-12-02 RX ADMIN — CILOSTAZOL 100 MG: 100 TABLET ORAL at 21:12

## 2020-12-02 RX ADMIN — REMDESIVIR 100 MG: 100 INJECTION, POWDER, LYOPHILIZED, FOR SOLUTION INTRAVENOUS at 21:14

## 2020-12-02 RX ADMIN — ALBUTEROL SULFATE 2 PUFF: 90 AEROSOL, METERED RESPIRATORY (INHALATION) at 11:53

## 2020-12-02 RX ADMIN — LIDOCAINE HYDROCHLORIDE: 10 INJECTION, SOLUTION INFILTRATION; PERINEURAL at 15:14

## 2020-12-02 RX ADMIN — GABAPENTIN 200 MG: 100 CAPSULE ORAL at 21:11

## 2020-12-02 RX ADMIN — SODIUM CHLORIDE: 9 INJECTION, SOLUTION INTRAVENOUS at 15:01

## 2020-12-02 RX ADMIN — SODIUM CHLORIDE, PRESERVATIVE FREE 10 ML: 5 INJECTION INTRAVENOUS at 09:49

## 2020-12-02 RX ADMIN — VANCOMYCIN HYDROCHLORIDE 1750 MG: 1 INJECTION, POWDER, LYOPHILIZED, FOR SOLUTION INTRAVENOUS at 11:24

## 2020-12-02 RX ADMIN — INSULIN LISPRO 10 UNITS: 100 INJECTION, SOLUTION INTRAVENOUS; SUBCUTANEOUS at 18:15

## 2020-12-02 RX ADMIN — BUDESONIDE AND FORMOTEROL FUMARATE DIHYDRATE 2 PUFF: 160; 4.5 AEROSOL RESPIRATORY (INHALATION) at 19:57

## 2020-12-02 RX ADMIN — ALBUTEROL SULFATE 2 PUFF: 90 AEROSOL, METERED RESPIRATORY (INHALATION) at 07:41

## 2020-12-02 RX ADMIN — INSULIN GLARGINE 30 UNITS: 100 INJECTION, SOLUTION SUBCUTANEOUS at 21:11

## 2020-12-02 RX ADMIN — METAXALONE 800 MG: 800 TABLET ORAL at 11:24

## 2020-12-02 RX ADMIN — OXYCODONE AND ACETAMINOPHEN 1 TABLET: 5; 325 TABLET ORAL at 05:51

## 2020-12-02 RX ADMIN — INSULIN LISPRO 10 UNITS: 100 INJECTION, SOLUTION INTRAVENOUS; SUBCUTANEOUS at 12:58

## 2020-12-02 RX ADMIN — ALBUTEROL SULFATE 2 PUFF: 90 AEROSOL, METERED RESPIRATORY (INHALATION) at 04:58

## 2020-12-02 RX ADMIN — METAXALONE 800 MG: 800 TABLET ORAL at 21:12

## 2020-12-02 RX ADMIN — METAXALONE 800 MG: 800 TABLET ORAL at 14:59

## 2020-12-02 RX ADMIN — INSULIN GLARGINE 35 UNITS: 100 INJECTION, SOLUTION SUBCUTANEOUS at 09:30

## 2020-12-02 ASSESSMENT — PAIN SCALES - GENERAL
PAINLEVEL_OUTOF10: 6
PAINLEVEL_OUTOF10: 8
PAINLEVEL_OUTOF10: 5
PAINLEVEL_OUTOF10: 3
PAINLEVEL_OUTOF10: 6
PAINLEVEL_OUTOF10: 6
PAINLEVEL_OUTOF10: 5
PAINLEVEL_OUTOF10: 5
PAINLEVEL_OUTOF10: 8

## 2020-12-02 ASSESSMENT — PAIN DESCRIPTION - PAIN TYPE
TYPE: CHRONIC PAIN
TYPE: ACUTE PAIN
TYPE: CHRONIC PAIN
TYPE: ACUTE PAIN

## 2020-12-02 ASSESSMENT — PAIN DESCRIPTION - ONSET: ONSET: ON-GOING

## 2020-12-02 ASSESSMENT — PAIN DESCRIPTION - FREQUENCY: FREQUENCY: INTERMITTENT

## 2020-12-02 ASSESSMENT — PAIN DESCRIPTION - DESCRIPTORS
DESCRIPTORS: ACHING;DISCOMFORT
DESCRIPTORS: ACHING
DESCRIPTORS: ACHING

## 2020-12-02 ASSESSMENT — PAIN - FUNCTIONAL ASSESSMENT: PAIN_FUNCTIONAL_ASSESSMENT: PREVENTS OR INTERFERES SOME ACTIVE ACTIVITIES AND ADLS

## 2020-12-02 ASSESSMENT — PAIN DESCRIPTION - ORIENTATION
ORIENTATION: RIGHT

## 2020-12-02 ASSESSMENT — PAIN DESCRIPTION - LOCATION
LOCATION: SHOULDER
LOCATION: SHOULDER;NECK
LOCATION: SHOULDER;NECK
LOCATION: SHOULDER

## 2020-12-02 ASSESSMENT — PAIN DESCRIPTION - PROGRESSION: CLINICAL_PROGRESSION: NOT CHANGED

## 2020-12-02 ASSESSMENT — PAIN SCALES - WONG BAKER
WONGBAKER_NUMERICALRESPONSE: 0
WONGBAKER_NUMERICALRESPONSE: 6

## 2020-12-02 NOTE — PLAN OF CARE
Problem: Airway Clearance - Ineffective  Goal: Achieve or maintain patent airway  Outcome: Ongoing     Problem: Gas Exchange - Impaired  Goal: Absence of hypoxia  Outcome: Ongoing  Goal: Promote optimal lung function  Outcome: Ongoing     Problem: Breathing Pattern - Ineffective  Goal: Ability to achieve and maintain a regular respiratory rate  Outcome: Ongoing     Problem:  Body Temperature -  Risk of, Imbalanced  Goal: Ability to maintain a body temperature within defined limits  Outcome: Ongoing  Goal: Will regain or maintain usual level of consciousness  Outcome: Ongoing  Goal: Complications related to the disease process, condition or treatment will be avoided or minimized  Outcome: Ongoing     Problem: Isolation Precautions - Risk of Spread of Infection  Goal: Prevent transmission of infection  Outcome: Ongoing     Problem: Nutrition Deficits  Goal: Optimize nutrtional status  Outcome: Ongoing     Problem: Risk for Fluid Volume Deficit  Goal: Maintain normal heart rhythm  Outcome: Ongoing  Goal: Maintain absence of muscle cramping  Outcome: Ongoing  Goal: Maintain normal serum potassium, sodium, calcium, phosphorus, and pH  Outcome: Ongoing     Problem: Loneliness or Risk for Loneliness  Goal: Demonstrate positive use of time alone when socialization is not possible  Outcome: Ongoing     Problem: Fatigue  Goal: Verbalize increase energy and improved vitality  Outcome: Ongoing     Problem: Patient Education: Go to Patient Education Activity  Goal: Patient/Family Education  Outcome: Ongoing     Problem: Falls - Risk of:  Goal: Will remain free from falls  Description: Will remain free from falls  Outcome: Ongoing  Goal: Absence of physical injury  Description: Absence of physical injury  Outcome: Ongoing     Problem: Discharge Planning:  Goal: Patients continuum of care needs are met  Description: Patients continuum of care needs are met  12/1/2020 2308 by Patrick [place] RN  Outcome: Ongoing  12/1/2020 1138 by Demi Rasmussen, RN  Outcome: Met This Shift

## 2020-12-02 NOTE — PROGRESS NOTES
Occupational Therapy   Occupational Therapy Initial Assessment  Date: 2020   Patient Name: Leland Saenz  MRN: [id number]     : 1950    Date of Service: 2020    Discharge Recommendations:  Home with assist PRN     Assessment   Performance deficits / Impairments: Decreased endurance  Decision Making: Low Complexity  No Skilled OT: Independent with ADL's  REQUIRES OT FOLLOW UP: No  Safety Devices  Safety Devices in place: Yes  Type of devices: Left in chair;Call light within reach           Patient Diagnosis(es): The encounter diagnosis was Pneumonia due to COVID-19 virus. has a past medical history of Acute prostatitis, Asthma, Balanitis, BPH (benign prostatic hyperplasia), Cellulitis of right foot, Chest pain, History of prostatitis, History of pulmonary embolism, History of tobacco abuse, Hypertension, JANET on CPAP, SVT (supraventricular tachycardia) ([facility] [address] Type 2 diabetes mellitus ([facility] [address] and Urinary tract infectious disease. has a past surgical history that includes Knee arthroscopy (Right); hernia repair (); Wrist surgery (Right); Elbow surgery (Right); Colonoscopy; Vena Cava Filter Placement (2012); joint replacement (Right); and Colonoscopy (2018).            Restrictions  Restrictions/Precautions  Restrictions/Precautions: General Precautions, Contact Precautions, Isolation  Implants present? : Metal implants(R knee)    Subjective   General  Chart Reviewed: Yes  Patient assessed for rehabilitation services?: Yes  Family / Caregiver Present: No  Referring Practitioner: ISAAC Wiley  Diagnosis: pneumonia due to covid  Subjective  Subjective: Patient rec'd in chair, pleasant and cooperative 81 yr old male with SOB  Patient Currently in Pain: Yes  Pain Assessment  Pain Assessment: 0-10  Pain Level: 5  Murphy-Baker Pain Rating: Hurts even more  Patient's Stated Pain Goal: No pain  Pain Type: Acute pain  Pain Location: Shoulder;Neck  Pain Orientation: Right  Pain Descriptors: Aching;Discomfort  Pain Frequency: Intermittent  Pain Onset: On-going  Clinical Progression: Not changed  Functional Pain Assessment: Prevents or interferes some active activities and ADLs  Vital Signs  Temp: 97.9 °F (36.6 °C)  Temp Source: Tympanic  Heart Rate Source: Monitor  Resp: 20  BP: (!) 147/71  BP Location: Right lower arm  MAP (mmHg): 87  Patient Position: Semi fowlers  Patient Currently in Pain: Yes  Oxygen Therapy  SpO2: 96 %  Pulse Oximeter Device Mode: Continuous  O2 Device: None (Room air)     Social/Functional History  Social/Functional History  Lives With: Spouse, Daughter  Type of Home: House  Home Layout: One level  Home Access: Level entry  Bathroom Shower/Tub: Tub/Shower unit  Bathroom Toilet: Standard  Receives Help From: Family  ADL Assistance: Independent  Homemaking Assistance: Independent  Homemaking Responsibilities: Yes  Meal Prep Responsibility: Secondary  Laundry Responsibility: Secondary  Cleaning Responsibility: Secondary  Shopping Responsibility: Secondary  Ambulation Assistance: Independent  Transfer Assistance: Independent  Active : Yes  Occupation: Full time employment  Type of occupation: x-ray tech       Objective   Vision: Within Functional Limits  Hearing: Within functional limits    Orientation  Overall Orientation Status: Within Normal Limits        ADL  LE Dressing: Independent  Toileting: Independent           Transfers  Slide Board: Independent  Sit to stand: Independent  Stand to sit:  Independent     Cognition  Overall Cognitive Status: WNL       LUE AROM (degrees)  LUE AROM : WFL  Left Hand AROM (degrees)  Left Hand AROM: WFL  RUE AROM (degrees)  RUE AROM : WFL  Right Hand AROM (degrees)  Right Hand AROM: WFL  LUE Strength  Gross LUE Strength: WFL  RUE Strength  Gross RUE Strength: WFL     Plan   Plan  Times per week: acute care OT not required      Goals  Short term goals  Time Frame for Short term goals: eval only       Therapy Time   Individual Concurrent Group Co-treatment   Time In 1640         Time Out 1700         Minutes 20         Timed Code Treatment Minutes: 0 Minutes       TITA DREW OTR, OT

## 2020-12-02 NOTE — PROGRESS NOTES
Physical Therapy    Facility/Department: [facility]  PROGRESSIVE CARE  Initial Assessment    NAME: Sergio Larsen  : 1950  MRN: [id number]    Date of Service: 2020    Discharge Recommendations:  Outpatient PT(OP PT to address neck and shoulder posture)        Assessment   Body structures, Functions, Activity limitations: Increased pain  Treatment Diagnosis: neck and shoulder pain-suggested that patient seek outpatient PT to address neck/shoulder posture once he is COVID negative  Prognosis: Good  Decision Making: Low Complexity  PT Education: Plan of Care  REQUIRES PT FOLLOW UP: No  Activity Tolerance  Activity Tolerance: Patient Tolerated treatment well       Patient Diagnosis(es): The encounter diagnosis was Pneumonia due to COVID-19 virus. has a past medical history of Acute prostatitis, Asthma, Balanitis, BPH (benign prostatic hyperplasia), Cellulitis of right foot, Chest pain, History of prostatitis, History of pulmonary embolism, History of tobacco abuse, Hypertension, JANET on CPAP, SVT (supraventricular tachycardia) ([address] Type 2 diabetes mellitus ([address] and Urinary tract infectious disease. has a past surgical history that includes Knee arthroscopy (Right); hernia repair (2003); Wrist surgery (Right); Elbow surgery (Right); Colonoscopy; Vena Cava Filter Placement (2012); joint replacement (Right); and Colonoscopy (2018). Restrictions  Restrictions/Precautions  Restrictions/Precautions: General Precautions, Contact Precautions, Isolation  Implants present? : Metal implants(R knee)  Vision/Hearing        Subjective  General  Chart Reviewed: Yes  Patient assessed for rehabilitation services?: Yes  Response To Previous Treatment: Not applicable  Family / Caregiver Present: No  Referring Practitioner: Laura Riggins MD  Referral Date : 20  Diagnosis: Shortness of Breath  Follows Commands: Within Functional Limits  Subjective  Subjective: \"I'm feeling better.  I think I'm on the mend now\"  Pain Screening  Patient Currently in Pain: Yes  Pain Assessment  Pain Assessment: 0-10  Pain Level: 5  Patient's Stated Pain Goal: No pain  Pain Type: Acute pain  Pain Location: Shoulder;Neck  Pain Orientation: Right  Pain Descriptors: Aching;Discomfort  Pain Frequency: Intermittent  Pain Onset: On-going  Clinical Progression: Not changed  Functional Pain Assessment: Prevents or interferes some active activities and ADLs  Vital Signs  Patient Currently in Pain: Yes       Orientation  Orientation  Overall Orientation Status: Within Normal Limits  Social/Functional History  Social/Functional History  Lives With: Spouse, Daughter  Type of Home: House  Home Layout: One level  Home Access: Level entry  Bathroom Shower/Tub: Tub/Shower unit  Bathroom Toilet: Standard  Receives Help From: Family  ADL Assistance: Independent  Homemaking Assistance: Independent  Homemaking Responsibilities: Yes  Meal Prep Responsibility: Secondary  Laundry Responsibility: Secondary  Cleaning Responsibility: Secondary  Shopping Responsibility: Secondary  Ambulation Assistance: Independent  Transfer Assistance: Independent  Active : Yes  Occupation: Full time employment  Type of occupation: x-ray tech    Objective     Observation/Palpation  Posture: Fair  Observation: increased forward head, rounded/protracted shoulders    PROM RLE (degrees)  RLE PROM: WNL  AROM RLE (degrees)  RLE AROM: WNL  PROM LLE (degrees)  LLE PROM: WNL  AROM LLE (degrees)  LLE AROM : WNL  Strength RLE  Strength RLE: WFL  Strength LLE  Strength LLE: WFL        Bed mobility  Bridging: Independent  Rolling to Left: Independent  Rolling to Right: Independent  Supine to Sit: Independent  Sit to Supine: Independent  Scooting: Independent  Transfers  Sit to Stand: Independent  Stand to sit:  Independent  Bed to Chair: Independent  Stand Pivot Transfers: Independent  Lateral Transfers: Independent  Ambulation  Ambulation?: Yes  Ambulation 1  Surface: level dominique  Device: No Device  Assistance: Modified Independent  Distance: 35'     Balance  Posture: Fair  Sitting - Static: Good  Sitting - Dynamic: Good  Standing - Static: Good  Standing - Dynamic: Good      Plan   Plan  Times per week: NPU  Safety Devices  Type of devices: Call light within reach, Gait belt, Left in chair, Nurse notified    Goals  Patient Goals   Patient goals : NPU     Therapy Time   Individual Concurrent Group Co-treatment   Time In [employer]         Time Out 1700         Minutes 20         Timed Code Treatment Minutes: 10 Minutes     Haydee Plummer PT, DPT

## 2020-12-02 NOTE — PLAN OF CARE
Problem: Airway Clearance - Ineffective  Goal: Achieve or maintain patent airway  12/2/2020 0937 by Latosha Hoffman RN  Outcome: Ongoing  12/1/2020 2308 by Teo Hale RN  Outcome: Ongoing     Problem: Gas Exchange - Impaired  Goal: Absence of hypoxia  12/2/2020 0937 by Latosha Hoffman RN  Outcome: Ongoing  12/1/2020 2308 by Teo Hale RN  Outcome: Ongoing  Goal: Promote optimal lung function  12/2/2020 0937 by Latosha Hoffman RN  Outcome: Ongoing  12/1/2020 2308 by Teo Hale RN  Outcome: Ongoing     Problem: Breathing Pattern - Ineffective  Goal: Ability to achieve and maintain a regular respiratory rate  12/2/2020 0937 by Latosha Hoffman RN  Outcome: Ongoing  12/1/2020 2308 by Teo Hale RN  Outcome: Ongoing     Problem:  Body Temperature -  Risk of, Imbalanced  Goal: Ability to maintain a body temperature within defined limits  12/2/2020 0937 by Latosha Hoffman RN  Outcome: Ongoing  12/1/2020 2308 by Teo Hale RN  Outcome: Ongoing  Goal: Will regain or maintain usual level of consciousness  12/2/2020 0937 by Latosha Hoffman RN  Outcome: Ongoing  12/1/2020 2308 by Teo Hale RN  Outcome: Ongoing  Goal: Complications related to the disease process, condition or treatment will be avoided or minimized  12/2/2020 0937 by Latosha Hoffman RN  Outcome: Ongoing  12/1/2020 2308 by Teo Hale RN  Outcome: Ongoing     Problem: Isolation Precautions - Risk of Spread of Infection  Goal: Prevent transmission of infection  12/2/2020 0937 by Latosha Hoffman RN  Outcome: Ongoing  12/1/2020 2308 by Teo Hale RN  Outcome: Ongoing     Problem: Nutrition Deficits  Goal: Optimize nutrtional status  12/2/2020 0937 by Latosha Hoffman RN  Outcome: Ongoing  12/1/2020 2308 by Teo Hale RN  Outcome: Ongoing     Problem: Risk for Fluid Volume Deficit  Goal: Maintain normal heart rhythm  12/2/2020 0937 by Latosha Hoffman RN  Outcome: Ongoing  12/1/2020 2308 by Teo Hale RN  Outcome: Ongoing  Goal: Maintain absence of muscle cramping  12/2/2020 0937 by Kj Noguera RN  Outcome: Ongoing  12/1/2020 2308 by Rayshawn Dobbins RN  Outcome: Ongoing  Goal: Maintain normal serum potassium, sodium, calcium, phosphorus, and pH  12/2/2020 0937 by Kj Noguera RN  Outcome: Ongoing  12/1/2020 2308 by Rayshawn Dobbins RN  Outcome: Ongoing     Problem: Loneliness or Risk for Loneliness  Goal: Demonstrate positive use of time alone when socialization is not possible  12/2/2020 0937 by Kj Noguera RN  Outcome: Ongoing  12/1/2020 2308 by Rayshawn Dobbins RN  Outcome: Ongoing     Problem: Fatigue  Goal: Verbalize increase energy and improved vitality  12/2/2020 0937 by Kj Noguera RN  Outcome: Ongoing  12/1/2020 2308 by Rayshawn Dobbins RN  Outcome: Ongoing     Problem: Patient Education: Go to Patient Education Activity  Goal: Patient/Family Education  12/2/2020 5981 by Kj Noguera RN  Outcome: Ongoing  12/1/2020 2308 by Rayshawn Dobbins RN  Outcome: Ongoing     Problem: Falls - Risk of:  Goal: Will remain free from falls  Description: Will remain free from falls  12/2/2020 0937 by Kj Noguera RN  Outcome: Ongoing  12/1/2020 2308 by Rayshawn Dobbins RN  Outcome: Ongoing  Goal: Absence of physical injury  Description: Absence of physical injury  12/2/2020 0937 by Kj Noguera RN  Outcome: Ongoing  12/1/2020 2308 by Rayshawn Dobbins RN  Outcome: Ongoing     Problem: Discharge Planning:  Goal: Patients continuum of care needs are met  Description: Patients continuum of care needs are met  12/2/2020 0937 by Kj Noguera RN  Outcome: Ongoing  12/1/2020 2308 by Rayshawn Dobbins RN  Outcome: Ongoing

## 2020-12-02 NOTE — CONSULTS
Patient admitted with COVID pneumonia, also found to have positive blood culture for beta hemolytic strep. On further questioning, he believes he has an abdominal wall abscess, which he drained himself with \"sterile\" needle. \"I got a lot of pus out. \"    Had a repair of a recurrent ventral hernia in 2012.     Past Medical History:   Diagnosis Date    Acute prostatitis 12/16/2019    Asthma     Balanitis 4/18/2017    BPH (benign prostatic hyperplasia)     Cellulitis of right foot 12/16/2019    Chest pain 4/9/2008    History of prostatitis     History of pulmonary embolism     History of tobacco abuse     Hypertension     JANET on CPAP     SVT (supraventricular tachycardia) ([facility] [address] 11/14/2019    converted with adenosine    Type 2 diabetes mellitus ([facility] [address]     Urinary tract infectious disease 12/16/2019     Past Surgical History:   Procedure Laterality Date    COLONOSCOPY      COLONOSCOPY  08/03/2018    [id number]    ELBOW SURGERY Right     HERNIA REPAIR  3584    UMBILICAL    JOINT REPLACEMENT Right     total knee    KNEE ARTHROSCOPY Right     VENA CAVA FILTER PLACEMENT  2012    WRIST SURGERY Right      Current Facility-Administered Medications   Medication Dose Route Frequency Provider Last Rate Last Dose    insulin glargine (LANTUS) injection vial 35 Units  35 Units Subcutaneous Daily Ashwini Simon   35 Units at 12/02/20 0930    insulin lispro (HUMALOG) injection vial 10 Units  10 Units Subcutaneous TID  Areta Mosquera Meng   10 Units at 12/02/20 1258    metaxalone (SKELAXIN) tablet 800 mg  800 mg Oral TID Areta Mosquera Meng   800 mg at 12/02/20 1124    vancomycin (VANCOCIN) 1,750 mg in dextrose 5 % 500 mL IVPB  1,750 mg Intravenous Q12H Mannie Meng 250 mL/hr at 12/02/20 1124 1,750 mg at 12/02/20 1124    vancomycin (VANCOCIN) intermittent dosing (placeholder)   Other RX Placeholder Ashwini Simon        [facility]) tablet 500 mg  500 mg Oral Nightly Mannie Meng   500 mg at 12/01/20 2024    atorvastatin (LIPITOR) tablet 40 mg  40 mg Oral Daily Niya Mince, APRN - CNP   40 mg at 12/02/20 5381    Vitamin D (CHOLECALCIFEROL) tablet 2,000 Units  2,000 Units Oral Daily Niya Mince, APRN - CNP   2,000 Units at 12/02/20 1223    cilostazol (PLETAL) tablet 100 mg  100 mg Oral BID Niya Mince, APRN - CNP   100 mg at 12/02/20 2390    dilTIAZem (CARDIZEM CD) extended release capsule 120 mg  120 mg Oral Daily Niya Mince, APRN - CNP   120 mg at 12/02/20 1090    finasteride (PROSCAR) tablet 5 mg  5 mg Oral Daily Niya Mince, APRN - CNP   5 mg at 12/02/20 6020    budesonide-formoterol (SYMBICORT) 160-4.5 MCG/ACT inhaler 2 puff  2 puff Inhalation BID Niya Mince, APRN - CNP   2 puff at 12/02/20 0741    cetirizine (ZYRTEC) tablet 10 mg  10 mg Oral Daily Niya Mince, APRN - CNP   10 mg at 12/02/20 0926    tamsulosin (FLOMAX) capsule 0.4 mg  0.4 mg Oral Daily Niya Mince, APRN - CNP   0.4 mg at 12/02/20 0926    0.9 % sodium chloride infusion   Intravenous Continuous Niya Mince, APRN -  mL/hr at 12/02/20 0551      sodium chloride flush 0.9 % injection 10 mL  10 mL Intravenous 2 times per day Niya Mince, APRN - CNP   10 mL at 12/02/20 0949    sodium chloride flush 0.9 % injection 10 mL  10 mL Intravenous PRN Niya Mince, APRN - CNP        acetaminophen (TYLENOL) tablet 650 mg  650 mg Oral Q6H PRN Niya Mince, APRN - CNP   650 mg at 12/01/20 0305    cefTRIAXone (ROCEPHIN) 1 g IVPB in 50 mL D5W minibag  1 g Intravenous Q24H Niya Mince, APRN - CNP   Stopped at 12/01/20 1606    guaiFENesin-dextromethorphan (ROBITUSSIN DM) 100-10 MG/5ML syrup 5 mL  5 mL Oral Q4H PRN Niya Mince, APRN - CNP        insulin lispro (HUMALOG) injection vial 0-12 Units  0-12 Units Subcutaneous TID WC Niya YINKA Justice CNP   4 Units at 12/02/20 1258    insulin lispro (HUMALOG) injection vial 0-6 Units  0-6 Units Subcutaneous Nightly Niya YINKA Justice CNP   1 Units at 20 213    glucose (GLUTOSE) 40 % oral gel 15 g  15 g Oral PRN YINKA Burgess - CNP        dextrose 50 % IV solution  12.5 g Intravenous PRN YINKA Burgess - CNP        glucagon (rDNA) injection 1 mg  1 mg Intramuscular PRN Sweetie Galarza, YINKA - CNP        dextrose 5 % solution  100 mL/hr Intravenous PRN YINKA Burgess - CNP        remdesivir 100 mg in sodium chloride 0.9 % 250 mL IVPB  100 mg Intravenous Q24H Franchesca Bardales MD   Stopped at 20 2200    0.9 % sodium chloride bolus  30 mL Intravenous PRN Franchesca Bardales MD        [facility]) nebulization 0.63 mg  0.63 mg Nebulization Q6H PRN Clovia Demian, APRN - CNP        apixaban (ELIQUIS) tablet 5 mg  5 mg Oral BID Clovia Degree, APRN - CNP   5 mg at 20 8340    gabapentin (NEURONTIN) capsule 200 mg  200 mg Oral BID Clovia Degree, APRN - CNP   200 mg at 20 6749    insulin glargine (LANTUS) injection vial 30 Units  30 Units Subcutaneous Nightly Clovia Degree, APRN - CNP   30 Units at 20 2137    albuterol sulfate  (90 Base) MCG/ACT inhaler 2 puff  2 puff Inhalation Q4H Marlys [place] Meng   2 puff at 20 1153    oxyCODONE-acetaminophen (PERCOCET) 5-325 MG per tablet 1 tablet  1 tablet Oral Q4H PRN Clovia Demian, APRN - CNP   1 tablet at 20 1318    buPROPion [facility]) extended release tablet 100 mg  100 mg Oral BID Clovia Degree, APRN - CNP         Allergies   Allergen Reactions    Amoxicillin Hives     Hand swelling also     Social History     Tobacco Use    Smoking status: Former Smoker     Last attempt to quit: 2007     Years since quittin.9    Smokeless tobacco: Former User     Quit date: 2007   Substance Use Topics    Alcohol use: Yes     Comment: Occasional whiskey on the weekends    Drug use: No     Family History   Problem Relation Age of Onset    Cancer Mother         colon cancer    Diabetes Father     Kidney Disease Father      BP (!) 157/85   Pulse 87   Temp 97.3 °F (36.3 °C) (Tympanic)   Resp 10   Ht 6' 1\" (1.854 m)   Wt 281 lb 8 oz (127.7 kg)   SpO2 96%   BMI 37.14 kg/m²     Abd - soft, +BS    Midline scar is hypertrophic. His skin is edematous and indurated, but not red, tender or fluctuant. He has golf-ball size lump in he mid scar where her drained the pus. It is quite firm. IMP/PLAN  1) Abdominal wall abscess - will proceed with I&D at bedside today.

## 2020-12-02 NOTE — PROGRESS NOTES
SW contacted patient via phone to complete a Psychosocial Assessment for evaluation of patient's mental health, social status, and functional capacity within the community. Patient is a 81year old male admitted due to [place]. Patient was alert, oriented, and engaging during assessment. Patient lives with his wife and 16year old daughter in [place]. Patient discussed positive support from family and friends. Patient uses oxygen and CPAP as DMEs. Patient denies using outside community services. SW provided supportive listening while patient discussed past medical history and his services in the [facility]. Patient has PCP Portia Odom, and reports no issues affording his medication. SW assessed ADLs and means of transportation. Patient states he is independent in his ADLs and able to transport himself to appointments. SW assessed if patient had food insecurity or needed financial assistance. Patient works at [employer] and [employer] in [place] [employer]. Patient denies any food insecurity or financial concerns. Patient is a full code status at this time. Patient states he has a advance directive. SW encouraged patient to bring copy for medical records. Patient denies discharge needs or further services at this time. SW provided business card. SW will continue to monitor needs and assist as appropriate.        Electronically signed by SHAILA Farmer on 12/2/2020 at 9:31 AM

## 2020-12-02 NOTE — PROGRESS NOTES
SW contacted patients wife to assess needs. Wife reports herself and daughter are quarantine due to exposure. Wife denies any concerns at this time. Wife states patient has a advance directive however copy is at [facility]. SW encouraged wife to bring copy for medical records. No additional services needed at this times. SW provided contact information. SW will continue to monitor needs and assist as appropriate.        Electronically signed by SHAILA Rinaldi on 12/2/2020 at 9:34 AM

## 2020-12-02 NOTE — PROGRESS NOTES
Hospitalist Progress Note    Patient:  Eric Coelho     YOB: 1950    MRN: [id number]   Admit date: 11/30/2020     Acct: [de-identified]     PCP: Georgette Zhang    CC--Interval History:    Covid-19--positive pneumonia---on standard regimen--see below    S agalactiae--Group B strep--one blood culture --> Vancomycin pending all cultures---2D ECHO    JANET---CPAP home     DM2--Lantus--log regimen    HTN---117/75    See note below      All other ROS negative except noted in HPI    Diet:  DIET CARB CONTROL;    Medications:  Scheduled Meds:   vancomycin  1,750 mg Intravenous Q12H    vancomycin (VANCOCIN) intermittent dosing (placeholder)   Other RX Placeholder    azithromycin  500 mg Oral Nightly    atorvastatin  40 mg Oral Daily    Vitamin D  2,000 Units Oral Daily    cilostazol  100 mg Oral BID    dilTIAZem  120 mg Oral Daily    finasteride  5 mg Oral Daily    budesonide-formoterol  2 puff Inhalation BID    cetirizine  10 mg Oral Daily    tamsulosin  0.4 mg Oral Daily    sodium chloride flush  10 mL Intravenous 2 times per day    cefTRIAXone (ROCEPHIN) IV  1 g Intravenous Q24H    insulin lispro  0-12 Units Subcutaneous TID WC    insulin lispro  0-6 Units Subcutaneous Nightly    remdesivir IVPB  100 mg Intravenous Q24H    apixaban  5 mg Oral BID    gabapentin  200 mg Oral BID    insulin glargine  30 Units Subcutaneous Daily    insulin glargine  30 Units Subcutaneous Nightly    insulin lispro  8 Units Subcutaneous TID WC    albuterol sulfate HFA  2 puff Inhalation Q4H    buPROPion  100 mg Oral BID     Continuous Infusions:   sodium chloride 150 mL/hr at 12/01/20 2029    dextrose       PRN Meds:sodium chloride flush, acetaminophen **OR** [DISCONTINUED] acetaminophen, guaiFENesin-dextromethorphan, glucose, dextrose, glucagon (rDNA), dextrose, sodium chloride, levalbuterol, oxyCODONE-acetaminophen    Objective:  Labs:  CBC with Differential:    Lab Results   Component Value Date    WBC 8.8 12/01/2020    RBC 4.70 12/01/2020    HGB 13.9 12/01/2020    HCT 43.7 12/01/2020    PLT See Reflexed IPF Result 12/01/2020    MCV 93.0 12/01/2020    MCH 29.6 12/01/2020    MCHC 31.8 12/01/2020    RDW 16.1 12/01/2020    LYMPHOPCT 8 12/01/2020    MONOPCT 13 12/01/2020    BASOPCT 0 12/01/2020    MONOSABS 1.18 12/01/2020    LYMPHSABS 0.71 12/01/2020    EOSABS <0.03 12/01/2020    BASOSABS <0.03 12/01/2020    DIFFTYPE NOT REPORTED 12/01/2020     BMP:    Lab Results   Component Value Date     12/01/2020    K 4.0 12/01/2020     12/01/2020    CO2 20 12/01/2020    BUN 19 12/01/2020    LABALBU 3.4 12/01/2020    CREATININE 1.00 12/01/2020    CALCIUM 8.2 12/01/2020    GFRAA >60 12/01/2020    LABGLOM >60 12/01/2020    GLUCOSE 162 12/01/2020           Physical Exam:  Vitals: BP (!) 144/58   Pulse 103   Temp 97.5 °F (36.4 °C) (Oral)   Resp 16   Ht 6' 1\" (1.854 m)   Wt 281 lb 8 oz (127.7 kg)   SpO2 95%   BMI 37.14 kg/m²   24 hour intake/output:    Intake/Output Summary (Last 24 hours) at 12/1/2020 2157  Last data filed at 12/1/2020 1731  Gross per 24 hour   Intake 4998.88 ml   Output 825 ml   Net 4173.88 ml     Last 3 weights: Wt Readings from Last 3 Encounters:   11/30/20 281 lb 8 oz (127.7 kg)   12/07/19 286 lb (129.7 kg)   06/07/18 295 lb (133.8 kg)     HEENT: O2 NC---, Normocephalic and Atraumatic  Neck: Supple, No Masses, Tenderness, Nodularity and No Lymphadenopathy  Chest/Lungs: Prolonged Expiratory Phase and Distant Breath Sounds  Cardiac: Regular Rate and Rhythm without Rubs, Clicks, Gallops, or Murmurs  GI/Abdomen: Bowel Sounds Present and Soft, Non-tender, without Guarding or Rebound Tenderness  : Not examined  EXT/Skin: No Cyanosis, No Clubbing and Edema Present----trivial  Neuro: Alert and Oriented and No Localizing Signs/Symptoms      Assessment:    Principal Problem:    Pneumonia due to COVID-19 virus  Active Problems:    Hypoxia  Resolved Problems:    * No resolved hospital problems. *    Errol,   [address]      Elisha Riedel, FP---sagar]  FULL CODE    ELIQUIS   SCDs  SUPPLEMENTAL OXYGEN--CPAP     COVID-19---POSITIVE----11.30.2020    Anti-infectives:  Rocephin IV, Zithromax IV, Remdesivir IV, Vancomycin IV   [no steroids]    Covid-19 pneumonia----11.30.2020---hypoxia---fever---tachycardia           CTA chest-pulmonary--11.30.2020---no PE---moderate emphysema--not typical                       Covid-19 pattern--scattered areas of atelectasis           Sepsis ruled out---11.30.2020---elevated lactic acid resolved with hydration           EKG---11.30.2020---sinus tachycardia--114--PACs--RAD--incomplete RBBB---RSR V1-2  BLOOD CULTURE--11.30.2020--1/2--Group B streptococcus---S. agalactiae  Incomplete RBBB  Asthma  Emphysema   JANET---CPAP  Hypertension  Hyperlipidemia  DM---2---uncontrolled  PAD---peripheral arterial disease  Factor V Leiden mutation---hypercoaguable state         PE----history         DVT--history         IVC filter  Depression   Tobacco abuse---quit--2007  Hyponatremia  PMH:   BPH, balanitis, ED, fatty liver, prostatitis, lichen planus, peripheral               neuropathy, hypotestosteronism, prostatitis, UTIs  PSH:    colonoscopy--2018, right [id number]---[id number], umbilical hernia repair,              right TKA, IVC filter--2012, right wrist    Allergies:    amoxicillin---PCN----hives---swelling          Plan:  1. Covid-19 pneumonia----Rocephin--Zithromax---Remdesivir---O2---respiratory regimen--anticoagulation  2. S aglactiae blood culture---Vancomycin due to PCN allergy [preferred PCN G]----2D ECHO  3. Sodium replacement  4. DM2--diabetic admission--orders--Lantus--log  5. JANET--CPAP or auto-PAP and O2  6.   See orders      Electronically signed by Hugh Coronado on 12/1/2020 at 9:57 PM    Hospitalist

## 2020-12-03 ENCOUNTER — APPOINTMENT (OUTPATIENT)
Dept: GENERAL RADIOLOGY | Age: 70
DRG: 987 | End: 2020-12-03
Payer: COMMERCIAL

## 2020-12-03 VITALS
WEIGHT: 281.5 LBS | HEART RATE: 88 BPM | TEMPERATURE: 97.8 F | OXYGEN SATURATION: 96 % | BODY MASS INDEX: 37.31 KG/M2 | RESPIRATION RATE: 15 BRPM | HEIGHT: 73 IN | DIASTOLIC BLOOD PRESSURE: 97 MMHG | SYSTOLIC BLOOD PRESSURE: 154 MMHG

## 2020-12-03 LAB
ABSOLUTE EOS #: <0.03 K/UL (ref 0–0.44)
ABSOLUTE IMMATURE GRANULOCYTE: <0.03 K/UL (ref 0–0.3)
ABSOLUTE LYMPH #: 0.73 K/UL (ref 1.1–3.7)
ABSOLUTE MONO #: 0.83 K/UL (ref 0.1–1.2)
ALBUMIN SERPL-MCNC: 3.3 G/DL (ref 3.5–5.2)
ALBUMIN/GLOBULIN RATIO: 1.1 (ref 1–2.5)
ALP BLD-CCNC: 55 U/L (ref 40–129)
ALT SERPL-CCNC: 29 U/L (ref 5–41)
ANION GAP SERPL CALCULATED.3IONS-SCNC: 9 MMOL/L (ref 9–17)
ANION GAP SERPL CALCULATED.3IONS-SCNC: ABNORMAL MMOL/L (ref 9–17)
AST SERPL-CCNC: 15 U/L
BASOPHILS # BLD: 0 % (ref 0–2)
BASOPHILS ABSOLUTE: <0.03 K/UL (ref 0–0.2)
BILIRUB SERPL-MCNC: 0.59 MG/DL (ref 0.3–1.2)
BUN BLDV-MCNC: 11 MG/DL (ref 8–23)
BUN/CREAT BLD: 15 (ref 9–20)
CALCIUM SERPL-MCNC: 8.1 MG/DL (ref 8.6–10.4)
CHLORIDE BLD-SCNC: 99 MMOL/L (ref 98–107)
CHLORIDE BLD-SCNC: ABNORMAL MMOL/L (ref 98–107)
CO2: 23 MMOL/L (ref 20–31)
CO2: 24 MMOL/L (ref 20–31)
CREAT SERPL-MCNC: 0.71 MG/DL (ref 0.7–1.2)
D-DIMER QUANTITATIVE: 0.98 MG/L FEU (ref 0–0.59)
D-DIMER QUANTITATIVE: 1.14 MG/L FEU (ref 0–0.59)
DIFFERENTIAL TYPE: ABNORMAL
EOSINOPHILS RELATIVE PERCENT: 0 % (ref 1–4)
FIBRINOGEN: 617 MG/DL (ref 140–420)
GFR AFRICAN AMERICAN: >60 ML/MIN
GFR NON-AFRICAN AMERICAN: >60 ML/MIN
GFR SERPL CREATININE-BSD FRML MDRD: ABNORMAL ML/MIN/{1.73_M2}
GFR SERPL CREATININE-BSD FRML MDRD: ABNORMAL ML/MIN/{1.73_M2}
GLUCOSE BLD-MCNC: 172 MG/DL (ref 75–110)
GLUCOSE BLD-MCNC: 180 MG/DL (ref 75–110)
GLUCOSE BLD-MCNC: 221 MG/DL (ref 70–99)
GLUCOSE BLD-MCNC: ABNORMAL MG/DL (ref 70–99)
HCT VFR BLD CALC: 41.5 % (ref 40.7–50.3)
HEMOGLOBIN: 13.3 G/DL (ref 13–17)
IMMATURE GRANULOCYTES: 0 %
INR BLD: 1.3
INR BLD: 1.5
LYMPHOCYTES # BLD: 12 % (ref 24–43)
MCH RBC QN AUTO: 29.5 PG (ref 25.2–33.5)
MCHC RBC AUTO-ENTMCNC: 32 G/DL (ref 25.2–33.5)
MCV RBC AUTO: 92 FL (ref 82.6–102.9)
MONOCYTES # BLD: 14 % (ref 3–12)
NRBC AUTOMATED: 0 PER 100 WBC
PARTIAL THROMBOPLASTIN TIME: 33.3 SEC (ref 23.9–33.8)
PARTIAL THROMBOPLASTIN TIME: 48.8 SEC (ref 23.9–33.8)
PDW BLD-RTO: 16.1 % (ref 11.8–14.4)
PLATELET # BLD: ABNORMAL K/UL (ref 138–453)
PLATELET ESTIMATE: ABNORMAL
PLATELET, FLUORESCENCE: 60 K/UL (ref 138–453)
PLATELET, IMMATURE FRACTION: 11.5 % (ref 1.1–10.3)
PMV BLD AUTO: ABNORMAL FL (ref 8.1–13.5)
POTASSIUM SERPL-SCNC: 3.8 MMOL/L (ref 3.7–5.3)
POTASSIUM SERPL-SCNC: ABNORMAL MMOL/L (ref 3.7–5.3)
PROTHROMBIN TIME: 15.3 SEC (ref 11.5–14.2)
PROTHROMBIN TIME: 17.3 SEC (ref 11.5–14.2)
RBC # BLD: 4.51 M/UL (ref 4.21–5.77)
RBC # BLD: ABNORMAL 10*6/UL
SEG NEUTROPHILS: 73 % (ref 36–65)
SEGMENTED NEUTROPHILS ABSOLUTE COUNT: 4.34 K/UL (ref 1.5–8.1)
SODIUM BLD-SCNC: 132 MMOL/L (ref 135–144)
SODIUM BLD-SCNC: ABNORMAL MMOL/L (ref 135–144)
TOTAL PROTEIN: 6.4 G/DL (ref 6.4–8.3)
WBC # BLD: 5.9 K/UL (ref 3.5–11.3)
WBC # BLD: ABNORMAL 10*3/UL

## 2020-12-03 PROCEDURE — 85379 FIBRIN DEGRADATION QUANT: CPT

## 2020-12-03 PROCEDURE — 6360000002 HC RX W HCPCS: Performed by: INTERNAL MEDICINE

## 2020-12-03 PROCEDURE — 99238 HOSP IP/OBS DSCHRG MGMT 30/<: CPT | Performed by: INTERNAL MEDICINE

## 2020-12-03 PROCEDURE — 36415 COLL VENOUS BLD VENIPUNCTURE: CPT

## 2020-12-03 PROCEDURE — 94760 N-INVAS EAR/PLS OXIMETRY 1: CPT

## 2020-12-03 PROCEDURE — 85055 RETICULATED PLATELET ASSAY: CPT

## 2020-12-03 PROCEDURE — 71045 X-RAY EXAM CHEST 1 VIEW: CPT

## 2020-12-03 PROCEDURE — 82947 ASSAY GLUCOSE BLOOD QUANT: CPT

## 2020-12-03 PROCEDURE — 85384 FIBRINOGEN ACTIVITY: CPT

## 2020-12-03 PROCEDURE — 6370000000 HC RX 637 (ALT 250 FOR IP): Performed by: INTERNAL MEDICINE

## 2020-12-03 PROCEDURE — 85730 THROMBOPLASTIN TIME PARTIAL: CPT

## 2020-12-03 PROCEDURE — 2580000003 HC RX 258: Performed by: INTERNAL MEDICINE

## 2020-12-03 PROCEDURE — 6370000000 HC RX 637 (ALT 250 FOR IP): Performed by: NURSE PRACTITIONER

## 2020-12-03 PROCEDURE — 85025 COMPLETE CBC W/AUTO DIFF WBC: CPT

## 2020-12-03 PROCEDURE — 94640 AIRWAY INHALATION TREATMENT: CPT

## 2020-12-03 PROCEDURE — 85610 PROTHROMBIN TIME: CPT

## 2020-12-03 PROCEDURE — 80053 COMPREHEN METABOLIC PANEL: CPT

## 2020-12-03 PROCEDURE — 2580000003 HC RX 258: Performed by: NURSE PRACTITIONER

## 2020-12-03 RX ORDER — CEFDINIR 300 MG/1
300 CAPSULE ORAL 2 TIMES DAILY
Qty: 8 CAPSULE | Refills: 0 | Status: SHIPPED | OUTPATIENT
Start: 2020-12-03 | End: 2020-12-07

## 2020-12-03 RX ORDER — INSULIN GLARGINE 100 [IU]/ML
40 INJECTION, SOLUTION SUBCUTANEOUS DAILY
Status: DISCONTINUED | OUTPATIENT
Start: 2020-12-04 | End: 2020-12-03 | Stop reason: HOSPADM

## 2020-12-03 RX ORDER — GREEN TEA/HOODIA GORDONII 315-12.5MG
1 CAPSULE ORAL 3 TIMES DAILY
Qty: 30 TABLET | Refills: 0 | COMMUNITY
Start: 2020-12-03 | End: 2020-12-13

## 2020-12-03 RX ORDER — AZITHROMYCIN 500 MG/1
500 TABLET, FILM COATED ORAL DAILY
Qty: 4 TABLET | Refills: 0 | Status: SHIPPED | OUTPATIENT
Start: 2020-12-03 | End: 2020-12-07

## 2020-12-03 RX ORDER — OXYCODONE HYDROCHLORIDE AND ACETAMINOPHEN 5; 325 MG/1; MG/1
1 TABLET ORAL EVERY 4 HOURS PRN
Qty: 10 TABLET | Refills: 0 | Status: SHIPPED | OUTPATIENT
Start: 2020-12-03 | End: 2020-12-06

## 2020-12-03 RX ORDER — CYCLOBENZAPRINE HCL 5 MG
5 TABLET ORAL 2 TIMES DAILY PRN
Qty: 20 TABLET | Refills: 0 | Status: SHIPPED | OUTPATIENT
Start: 2020-12-03 | End: 2020-12-13

## 2020-12-03 RX ORDER — GUAIFENESIN/DEXTROMETHORPHAN 100-10MG/5
5 SYRUP ORAL EVERY 4 HOURS PRN
Qty: 120 ML | Refills: 0 | Status: SHIPPED | OUTPATIENT
Start: 2020-12-03 | End: 2020-12-13

## 2020-12-03 RX ORDER — CLINDAMYCIN HYDROCHLORIDE 300 MG/1
300 CAPSULE ORAL 4 TIMES DAILY
Qty: 40 CAPSULE | Refills: 0 | Status: SHIPPED | OUTPATIENT
Start: 2020-12-03 | End: 2020-12-13

## 2020-12-03 RX ADMIN — INSULIN LISPRO 2 UNITS: 100 INJECTION, SOLUTION INTRAVENOUS; SUBCUTANEOUS at 13:19

## 2020-12-03 RX ADMIN — GABAPENTIN 200 MG: 100 CAPSULE ORAL at 08:27

## 2020-12-03 RX ADMIN — INSULIN LISPRO 15 UNITS: 100 INJECTION, SOLUTION INTRAVENOUS; SUBCUTANEOUS at 13:19

## 2020-12-03 RX ADMIN — ATORVASTATIN CALCIUM 40 MG: 40 TABLET, FILM COATED ORAL at 08:27

## 2020-12-03 RX ADMIN — CILOSTAZOL 100 MG: 100 TABLET ORAL at 08:28

## 2020-12-03 RX ADMIN — TAMSULOSIN HYDROCHLORIDE 0.4 MG: 0.4 CAPSULE ORAL at 08:27

## 2020-12-03 RX ADMIN — ALBUTEROL SULFATE 2 PUFF: 90 AEROSOL, METERED RESPIRATORY (INHALATION) at 03:22

## 2020-12-03 RX ADMIN — CHOLECALCIFEROL TAB 25 MCG (1000 UNIT) 2000 UNITS: 25 TAB at 08:27

## 2020-12-03 RX ADMIN — INSULIN LISPRO 1 UNITS: 100 INJECTION, SOLUTION INTRAVENOUS; SUBCUTANEOUS at 09:12

## 2020-12-03 RX ADMIN — OXYCODONE AND ACETAMINOPHEN 1 TABLET: 5; 325 TABLET ORAL at 02:06

## 2020-12-03 RX ADMIN — CETIRIZINE HYDROCHLORIDE 10 MG: 5 TABLET, FILM COATED ORAL at 08:27

## 2020-12-03 RX ADMIN — INSULIN HUMAN 5 UNITS: 100 INJECTION, SUSPENSION SUBCUTANEOUS at 10:31

## 2020-12-03 RX ADMIN — BUDESONIDE AND FORMOTEROL FUMARATE DIHYDRATE 2 PUFF: 160; 4.5 AEROSOL RESPIRATORY (INHALATION) at 09:46

## 2020-12-03 RX ADMIN — INSULIN GLARGINE 35 UNITS: 100 INJECTION, SOLUTION SUBCUTANEOUS at 09:13

## 2020-12-03 RX ADMIN — APIXABAN 5 MG: 2.5 TABLET, FILM COATED ORAL at 08:27

## 2020-12-03 RX ADMIN — ALBUTEROL SULFATE 2 PUFF: 90 AEROSOL, METERED RESPIRATORY (INHALATION) at 09:46

## 2020-12-03 RX ADMIN — DILTIAZEM HYDROCHLORIDE 120 MG: 120 CAPSULE, COATED, EXTENDED RELEASE ORAL at 08:28

## 2020-12-03 RX ADMIN — INSULIN LISPRO 10 UNITS: 100 INJECTION, SOLUTION INTRAVENOUS; SUBCUTANEOUS at 09:11

## 2020-12-03 RX ADMIN — OXYCODONE AND ACETAMINOPHEN 1 TABLET: 5; 325 TABLET ORAL at 06:00

## 2020-12-03 RX ADMIN — BUPROPION HYDROCHLORIDE 100 MG: 100 TABLET, EXTENDED RELEASE ORAL at 13:20

## 2020-12-03 RX ADMIN — FINASTERIDE 5 MG: 5 TABLET, FILM COATED ORAL at 08:28

## 2020-12-03 RX ADMIN — SODIUM CHLORIDE, PRESERVATIVE FREE 10 ML: 5 INJECTION INTRAVENOUS at 08:31

## 2020-12-03 RX ADMIN — VANCOMYCIN HYDROCHLORIDE 1750 MG: 1 INJECTION, POWDER, LYOPHILIZED, FOR SOLUTION INTRAVENOUS at 06:00

## 2020-12-03 RX ADMIN — METAXALONE 800 MG: 800 TABLET ORAL at 08:27

## 2020-12-03 RX ADMIN — METAXALONE 800 MG: 800 TABLET ORAL at 13:20

## 2020-12-03 ASSESSMENT — PAIN DESCRIPTION - PAIN TYPE: TYPE: ACUTE PAIN

## 2020-12-03 ASSESSMENT — PAIN DESCRIPTION - LOCATION: LOCATION: SHOULDER

## 2020-12-03 ASSESSMENT — PAIN SCALES - GENERAL
PAINLEVEL_OUTOF10: 7
PAINLEVEL_OUTOF10: 7

## 2020-12-03 ASSESSMENT — PAIN DESCRIPTION - ORIENTATION: ORIENTATION: RIGHT

## 2020-12-03 NOTE — PROGRESS NOTES
Hospitalist Progress Note    Patient:  Amber Garcia     YOB: 1950    MRN: [id number]   Admit date: 11/30/2020     Acct: [de-identified]     PCP: Edie Soto    CC--Interval History:   Covid--19 pneumonia---improved---on room air----home---12.3.2020    Group B strep---Vancomycin to Clindamycin x 10days---2D ECHO--no vegetations noted----to be seen immediately if fever--chills--increasing dyspnea    DM2---insulin adjusted    Na = 132----sodium replacement    Chronic anticoagulation cont'd [PE-DVT history]    POD 1--I&D--abdominal ski abscess---open no drainage or pus    Right shoulder muscle pain---improved with deep massage    See note below    All other ROS negative except noted in HPI    Diet:  DIET CARB CONTROL;    Medications:  Scheduled Meds:   vancomycin  1,750 mg Intravenous Q8H    [START ON 12/4/2020] insulin glargine  40 Units Subcutaneous Daily    insulin lispro  15 Units Subcutaneous TID WC    metaxalone  800 mg Oral TID    vancomycin (VANCOCIN) intermittent dosing (placeholder)   Other RX Placeholder    azithromycin  500 mg Oral Nightly    atorvastatin  40 mg Oral Daily    Vitamin D  2,000 Units Oral Daily    cilostazol  100 mg Oral BID    dilTIAZem  120 mg Oral Daily    finasteride  5 mg Oral Daily    budesonide-formoterol  2 puff Inhalation BID    cetirizine  10 mg Oral Daily    tamsulosin  0.4 mg Oral Daily    sodium chloride flush  10 mL Intravenous 2 times per day    cefTRIAXone (ROCEPHIN) IV  1 g Intravenous Q24H    insulin lispro  0-12 Units Subcutaneous TID WC    insulin lispro  0-6 Units Subcutaneous Nightly    remdesivir IVPB  100 mg Intravenous Q24H    apixaban  5 mg Oral BID    gabapentin  200 mg Oral BID    insulin glargine  30 Units Subcutaneous Nightly    albuterol sulfate HFA  2 puff Inhalation Q4H    buPROPion  100 mg Oral BID     Continuous Infusions:   dextrose       PRN Meds:sodium chloride flush, acetaminophen **OR** [DISCONTINUED] acetaminophen, guaiFENesin-dextromethorphan, glucose, dextrose, glucagon (rDNA), dextrose, sodium chloride, levalbuterol, oxyCODONE-acetaminophen    Objective:  Labs:  CBC with Differential:    Lab Results   Component Value Date    WBC 5.9 12/03/2020    RBC 4.51 12/03/2020    HGB 13.3 12/03/2020    HCT 41.5 12/03/2020    PLT See Reflexed IPF Result 12/03/2020    MCV 92.0 12/03/2020    MCH 29.5 12/03/2020    MCHC 32.0 12/03/2020    RDW 16.1 12/03/2020    LYMPHOPCT 12 12/03/2020    MONOPCT 14 12/03/2020    BASOPCT 0 12/03/2020    MONOSABS 0.83 12/03/2020    LYMPHSABS 0.73 12/03/2020    EOSABS <0.03 12/03/2020    BASOSABS <0.03 12/03/2020    DIFFTYPE NOT REPORTED 12/03/2020     BMP:    Lab Results   Component Value Date     12/03/2020    K 3.8 12/03/2020    CL 99 12/03/2020    CO2 24 12/03/2020    BUN 11 12/03/2020    LABALBU 3.3 12/03/2020    CREATININE 0.71 12/03/2020    CALCIUM 8.1 12/03/2020    GFRAA >60 12/03/2020    LABGLOM >60 12/03/2020    GLUCOSE 221 12/03/2020           Physical Exam:  Vitals: BP (!) 154/97   Pulse 88   Temp 97.8 °F (36.6 °C) (Oral)   Resp 15   Ht 6' 1\" (1.854 m)   Wt 281 lb 8 oz (127.7 kg)   SpO2 96%   BMI 37.14 kg/m²   24 hour intake/output:    Intake/Output Summary (Last 24 hours) at 12/3/2020 1221  Last data filed at 12/3/2020 0900  Gross per 24 hour   Intake 740 ml   Output 450 ml   Net 290 ml     Last 3 weights: Wt Readings from Last 3 Encounters:   11/30/20 281 lb 8 oz (127.7 kg)   12/07/19 286 lb (129.7 kg)   06/07/18 295 lb (133.8 kg)     HEENT: Normocephalic and Atraumatic  Neck: Supple, No Masses, Tenderness, Nodularity and No Lymphadenopathy  Chest/Lungs: Distant Breath Sounds  Cardiac: Regular Rate and Rhythm---no RCGM  GI/Abdomen:  Bowel Sounds Present and Soft, Non-tender, without Guarding or Rebound Tenderness  : Not examined  EXT/Skin: abdominal wound--opened---no drainage or pus, No Edema, No Cyanosis and No Clubbing  Neuro: Alert and Oriented and No Localizing Signs/Symptoms      Assessment:    Principal Problem:    Pneumonia due to COVID-19 virus  Active Problems:    Hypoxia  Resolved Problems:    * No resolved hospital problems. *    Errol,   [address]      OBED Bailey---sagar]  FULL CODE    ELIQUIS   SCDs  SUPPLEMENTAL OXYGEN--CPAP     COVID-19---POSITIVE----11.30.2020    Anti-infectives:  Rocephin IV, Zithromax IV, Remdesivir IV, Vancomycin IV   [no steroids]    Covid-19 pneumonia----11.30.2020---hypoxia---fever---tachycardia            CXR---12.3.2020---no changes           CXR---12.2.2020--patchy airspace disease---consistent with Covid-19           CTA chest-pulmonary--11.30.2020---no PE---moderate emphysema--not typical                       Covid-19 pattern--scattered areas of atelectasis           Sepsis ruled out---11.30.2020---elevated lactic acid resolved with hydration           EKG---11.30.2020---sinus tachycardia--114--PACs--RAD--incomplete RBBB---RSR V1-2  BLOOD CULTURE--11.30.2020--2/2--Group B streptococcus---S. agalactiae  Abdominal wall skin---former ventral hernia repair site---mesh----abscess           2D ECHO---12. 1.2020---no vegetations---NLVSF normal hyperdynamic                      function--mild concentric LVH---NRVSF---LVEF ~ 65%  Muscular right shoulder and neck pain  Incomplete RBBB  Musculoskeletal pain right shoulder--neck---POA--11.30.2020  Asthma  Emphysema   JANET---CPAP  Hypertension  Hyperlipidemia  DM---2---uncontrolled  PAD---peripheral arterial disease  Factor V Leiden mutation---hypercoaguable state         PE----history         DVT--history         IVC filter  Depression   Tobacco abuse---quit--2007  Hyponatremia  PMH:   BPH, balanitis, ED, fatty liver, prostatitis, lichen planus, peripheral               neuropathy, hypotestosteronism, prostatitis, UTIs  PSH:    colonoscopy--2018, right [id number]---[id number], umbilical hernia repair,              right TKA, IVC filter--2012, right wrist    Allergies: amoxicillin---PCN----hives---swelling        Plan:  1. Home----12.3.2020  2. Home medications reviewed  3. Eliquis---5 BID per prior regimen  4. Group B strep--Covid-19--Clindamycin--Omnicef--Zithromax  5. Probiotics  6. Pain right shoulder---Percocet--Flexeril---patient to be seen by pain management at [facility]  7. DM2----Lantus 40--30-----log 15-15-15  8. Follow up Dr. Sai Mcdonald, [facility]---sagar  9.    See orders     Electronically signed by Letitia Eng on 12/3/2020 at 12:21 PM    Hospitalist

## 2020-12-03 NOTE — PROGRESS NOTES
Pt provided with box lunch and fruit. Pt still c/o shoulder pain and back pain. Pt given 1 tab of percocet and skelaxin.

## 2020-12-03 NOTE — PROGRESS NOTES
Pharmacy Vancomycin Consult     Vancomycin Day: 2  Current Dosin mg q12h    Temp max:  99 F    Recent Labs     20  0530 20  0555   BUN 19 15       Recent Labs     20  0530 20  0555   CREATININE 1.00 0.76       Recent Labs     20  0530 20  0555   WBC 8.8 6.1         Intake/Output Summary (Last 24 hours) at 12/3/2020 0001  Last data filed at 2020 1841  Gross per 24 hour   Intake 5081 ml   Output 450 ml   Net 4631 ml       Culture Date      Source                       Results  20             Blood 1 of 2                 Group B STrep, Strep agalactiae per PCR  20            blood 2 of 2                  STREPTOCOCCI, BETA HEMOLYTIC GROUP BAbnormal     Ht Readings from Last 1 Encounters:   20 6' 1\" (1.854 m)        Wt Readings from Last 1 Encounters:   20 281 lb 8 oz (127.7 kg)         Body mass index is 37.14 kg/m². Estimated Creatinine Clearance: 132 mL/min (based on SCr of 0.76 mg/dL). Trough: 9.4 mcg/mL    Assessment/Plan:  Vancomycin trough below goal range at 9.4 mcg/mL, drawn almost 2 hours early. Will increase frequency to every 8 hours going forward. Pharmacy will continue to follow. Thank you,  DEVYN Chacon, PharmD  12/3/2020 12:08 AM

## 2020-12-03 NOTE — PLAN OF CARE
Problem: Airway Clearance - Ineffective  Goal: Achieve or maintain patent airway  12/3/2020 0720 by Maggie Birmingham RN  Outcome: Ongoing  12/3/2020 0720 by Maggie Birmingham RN  Outcome: Ongoing     Problem: Gas Exchange - Impaired  Goal: Absence of hypoxia  12/3/2020 0720 by Maggie Birmingham RN  Outcome: Ongoing  12/3/2020 0720 by Maggie Birmingham RN  Outcome: Ongoing  Goal: Promote optimal lung function  12/3/2020 0720 by Maggie Birmingham RN  Outcome: Ongoing  12/3/2020 0720 by Maggie Birmingham RN  Outcome: Ongoing     Problem: Breathing Pattern - Ineffective  Goal: Ability to achieve and maintain a regular respiratory rate  12/3/2020 0720 by Maggie Birmingham RN  Outcome: Ongoing  12/3/2020 0720 by Maggie Birmingham RN  Outcome: Ongoing     Problem:  Body Temperature -  Risk of, Imbalanced  Goal: Ability to maintain a body temperature within defined limits  12/3/2020 0720 by Maggie Birmingham RN  Outcome: Ongoing  12/3/2020 0720 by Maggie Birmingham RN  Outcome: Ongoing  Goal: Will regain or maintain usual level of consciousness  12/3/2020 0720 by Maggie Birmingham RN  Outcome: Ongoing  12/3/2020 0720 by Maggie Birmingham RN  Outcome: Ongoing  Goal: Complications related to the disease process, condition or treatment will be avoided or minimized  12/3/2020 0720 by Maggie Birmingham RN  Outcome: Ongoing  12/3/2020 0720 by Magige Birmingham RN  Outcome: Ongoing     Problem: Isolation Precautions - Risk of Spread of Infection  Goal: Prevent transmission of infection  12/3/2020 0720 by Maggie Birmingham RN  Outcome: Ongoing  12/3/2020 0720 by Maggie Birmingham RN  Outcome: Ongoing     Problem: Nutrition Deficits  Goal: Optimize nutrtional status  12/3/2020 0720 by Maggie Birmingham RN  Outcome: Ongoing  12/3/2020 0720 by Maggie Birmingham RN  Outcome: Ongoing     Problem: Risk for Fluid Volume Deficit  Goal: Maintain normal heart rhythm  12/3/2020 0720 by Maggie Birmingham RN  Outcome: Ongoing  12/3/2020 0720 by Maggie Birmingham RN  Outcome: Ongoing  Goal: Maintain absence of muscle cramping  12/3/2020 0720 by Elizabeth Cohen RN  Outcome: Ongoing  12/3/2020 0720 by Elizabeth Cohen RN  Outcome: Ongoing  Goal: Maintain normal serum potassium, sodium, calcium, phosphorus, and pH  12/3/2020 0720 by Elizabeth Cohen RN  Outcome: Ongoing  12/3/2020 0720 by Elizabeth Cohen RN  Outcome: Ongoing     Problem: Loneliness or Risk for Loneliness  Goal: Demonstrate positive use of time alone when socialization is not possible  12/3/2020 0720 by Elizabeth Cohen RN  Outcome: Ongoing  12/3/2020 0720 by Elizabeth Cohen RN  Outcome: Ongoing     Problem: Fatigue  Goal: Verbalize increase energy and improved vitality  12/3/2020 0720 by Elizabeth Cohen RN  Outcome: Ongoing  12/3/2020 0720 by Elizabeth Cohen RN  Outcome: Ongoing     Problem: Patient Education: Go to Patient Education Activity  Goal: Patient/Family Education  12/3/2020 0720 by Elizabeth Cohen RN  Outcome: Ongoing  12/3/2020 0720 by Elizabeth Cohen RN  Outcome: Ongoing     Problem: Falls - Risk of:  Goal: Will remain free from falls  Description: Will remain free from falls  12/3/2020 0720 by Elizabeth Cohen RN  Outcome: Ongoing  12/3/2020 0720 by Elizabeth Cohen RN  Outcome: Ongoing  Goal: Absence of physical injury  Description: Absence of physical injury  Outcome: Ongoing     Problem: Discharge Planning:  Goal: Patients continuum of care needs are met  Description: Patients continuum of care needs are met  Outcome: Ongoing     Problem: Pain:  Goal: Pain level will decrease  Description: Pain level will decrease  Outcome: Ongoing  Goal: Control of acute pain  Description: Control of acute pain  Outcome: Ongoing  Goal: Control of chronic pain  Description: Control of chronic pain  Outcome: Ongoing

## 2020-12-03 NOTE — DISCHARGE INSTR - DIET

## 2020-12-03 NOTE — PROGRESS NOTES
Hospitalist Progress Note    Patient:  Jcarlos Arredondo     YOB: 1950    MRN: [id number]   Admit date: 11/30/2020     Acct: [de-identified]     PCP: Alley Lara    CC--Interval History:   Covid 19 pneumonia---on antibiotics--Remdesivir---improved--has been room air    Group B Strep agalactiae---positive in both blood cultures--2D ECHO---no vegetations reported---on Vancomycin due to PCN allergy    Patient reported today that he had been having pain and tenderness in the suprapubic region where he had had a mesh ventral hernia repair--and, \"oh, by the way,\" prior to coming into the hospital had poked the site with a \"sterile\" needle several times  --> the expression of pus--may be the site of the bacteremia---General Surgery will further I&D the wound and determine whether or not the mesh is involved.     DM-2 blood glucose above goal ---> adjustment of insulin    Right shoulder and neck pain---muscular ---> physical--OT therapies and Skelaxin    See note below     All other ROS negative except noted in HPI    Diet:  DIET CARB CONTROL;    Medications:  Scheduled Meds:   insulin glargine  35 Units Subcutaneous Daily    insulin lispro  10 Units Subcutaneous TID WC    metaxalone  800 mg Oral TID    vancomycin  1,750 mg Intravenous Q12H    vancomycin (VANCOCIN) intermittent dosing (placeholder)   Other RX Placeholder    azithromycin  500 mg Oral Nightly    atorvastatin  40 mg Oral Daily    Vitamin D  2,000 Units Oral Daily    cilostazol  100 mg Oral BID    dilTIAZem  120 mg Oral Daily    finasteride  5 mg Oral Daily    budesonide-formoterol  2 puff Inhalation BID    cetirizine  10 mg Oral Daily    tamsulosin  0.4 mg Oral Daily    sodium chloride flush  10 mL Intravenous 2 times per day    cefTRIAXone (ROCEPHIN) IV  1 g Intravenous Q24H    insulin lispro  0-12 Units Subcutaneous TID WC    insulin lispro  0-6 Units Subcutaneous Nightly    remdesivir IVPB  100 mg Intravenous Q24H    apixaban  5 mg Oral BID    gabapentin  200 mg Oral BID    insulin glargine  30 Units Subcutaneous Nightly    albuterol sulfate HFA  2 puff Inhalation Q4H    buPROPion  100 mg Oral BID     Continuous Infusions:   sodium chloride 150 mL/hr at 12/02/20 1501    dextrose       PRN Meds:sodium chloride flush, acetaminophen **OR** [DISCONTINUED] acetaminophen, guaiFENesin-dextromethorphan, glucose, dextrose, glucagon (rDNA), dextrose, sodium chloride, levalbuterol, oxyCODONE-acetaminophen    Objective:  Labs:  CBC with Differential:    Lab Results   Component Value Date    WBC 6.1 12/02/2020    RBC 4.83 12/02/2020    HGB 14.5 12/02/2020    HCT 44.9 12/02/2020    PLT See Reflexed IPF Result 12/02/2020    MCV 93.0 12/02/2020    MCH 30.0 12/02/2020    MCHC 32.3 12/02/2020    RDW 16.1 12/02/2020    LYMPHOPCT 16 12/02/2020    MONOPCT 19 12/02/2020    BASOPCT 0 12/02/2020    MONOSABS 1.16 12/02/2020    LYMPHSABS 0.98 12/02/2020    EOSABS 0.16 12/02/2020    BASOSABS <0.03 12/02/2020    DIFFTYPE NOT REPORTED 12/02/2020     BMP:    Lab Results   Component Value Date     12/02/2020    K 4.0 12/02/2020     12/02/2020    CO2 22 12/02/2020    BUN 15 12/02/2020    LABALBU 3.5 12/02/2020    CREATININE 0.76 12/02/2020    CALCIUM 8.6 12/02/2020    GFRAA >60 12/02/2020    LABGLOM >60 12/02/2020    GLUCOSE 169 12/02/2020           Physical Exam:  Vitals: BP (!) 147/71   Pulse 87   Temp 99 °F (37.2 °C) (Tympanic)   Resp 18   Ht 6' 1\" (1.854 m)   Wt 281 lb 8 oz (127.7 kg)   SpO2 96%   BMI 37.14 kg/m²   24 hour intake/output:    Intake/Output Summary (Last 24 hours) at 12/2/2020 2111  Last data filed at 12/2/2020 1841  Gross per 24 hour   Intake 5081 ml   Output 450 ml   Net 4631 ml     Last 3 weights:   Wt Readings from Last 3 Encounters:   11/30/20 281 lb 8 oz (127.7 kg)   12/07/19 286 lb (129.7 kg)   06/07/18 295 lb (133.8 kg)     HEENT: Normocephalic and Atraumatic  Neck: but for palpable muscle tightness and subjective tenderness to palpation---, Supple, No Masses, Tenderness, Nodularity and No Lymphadenopathy  Chest/Lungs: Distant Breath Sounds---but better air movement  Cardiac: Regular Rate and Rhythm without Rubs, Clicks, Gallops, or Murmurs  GI/Abdomen: Bowel Sounds Present and Soft, Non-tender, without Guarding or Rebound Tenderness  : Not examined  EXT/Skin: skin of abdomen--uneven surface--tenderness---no current drainage, No Cyanosis and No Clubbing  Neuro: Alert and Oriented and No Localizing Signs/Symptoms      Assessment:    Principal Problem:    Pneumonia due to COVID-19 virus  Active Problems:    Hypoxia  Resolved Problems:    * No resolved hospital problems. *    Errol,   [address]  WM Raine Nuñez, FP---sagar]  FULL CODE    ELIQUIS   SCDs  SUPPLEMENTAL OXYGEN--CPAP     COVID-19---POSITIVE----11.30.2020    Anti-infectives:  Rocephin IV, Zithromax IV, Remdesivir IV, Vancomycin IV   [no steroids]    Covid-19 pneumonia----11.30.2020---hypoxia---fever---tachycardia           CXR---12.2.2020--patchy airspace disease---consistent with Covid-19           CTA chest-pulmonary--11.30.2020---no PE---moderate emphysema--not typical                       Covid-19 pattern--scattered areas of atelectasis           Sepsis ruled out---11.30.2020---elevated lactic acid resolved with hydration           EKG---11.30.2020---sinus tachycardia--114--PACs--RAD--incomplete RBBB---RSR V1-2  BLOOD CULTURE--11.30.2020--2/2--Group B streptococcus---S. agalactiae  Abdominal wall skin---former ventral hernia repair site---mesh----abscess           2D ECHO---12. 1.2020---no vegetations---NLVSF normal hyperdynamic                      function--mild concentric LVH---NRVSF---LVEF ~ 65%  Muscular right shoulder and neck pain  Incomplete RBBB  Musculoskeletal pain right shoulder--neck---POA--11.30.2020  Asthma  Emphysema   JANET---CPAP  Hypertension  Hyperlipidemia  DM---2---uncontrolled  PAD---peripheral arterial disease  Factor V Leiden mutation---hypercoaguable state         PE----history         DVT--history         IVC filter  Depression   Tobacco abuse---quit--2007  Hyponatremia  PMH:   BPH, balanitis, ED, fatty liver, prostatitis, lichen planus, peripheral               neuropathy, hypotestosteronism, prostatitis, UTIs  PSH:    colonoscopy--2018, right BHGJU---3867, umbilical hernia repair,              right TKA, IVC filter--2012, right wrist    Allergies:    amoxicillin---PCN----hives---swelling          Plan:  1. Covid 19 pneumonia--continue current regimen--IV antibiotics---Remdesivir  2. Strep Group B agalactiae----on Vancomycin IV  3. General Surgery----I&D abdominal skin abscess  4. Musculoskeletal right shoulder--neck pain---Skelaxin--physical-OT therapies  5.  2D ECHO--no vegetations  6.   See order    Electronically signed by Shy Antony on 12/2/2020 at 9:11 PM    Hospitalist

## 2020-12-04 ENCOUNTER — CARE COORDINATION (OUTPATIENT)
Dept: CASE MANAGEMENT | Age: 70
End: 2020-12-04

## 2020-12-04 NOTE — CARE COORDINATION
Jemma [facility] Transitions Initial Follow Up Call- COVID risk follow up call       Call within 2 business days of discharge: Yes    Patient: Nishi Blunt Patient : 1950   MRN: <[id number]>  Reason for Admission: pneumonia d/t COVID-19   Discharge Date: 12/3/20 RARS: Readmission Risk Score: 18      Last Discharge [facility]       Complaint Diagnosis Description Type Department Provider    20 Fever; Shortness of Breath; Urinary Frequency Pneumonia due to COVID-19 virus ED to Hosp-Admission (Discharged) (ADMITTED) [facility] JOSEPH Cohn Ace; Maude March. .. Spoke with: Dwana Jefferson     Call to pt who states he is doing \"pretty good\"  Denies SOB, fever/ chills, nausea, cough  States wound on abdomen is without redness, drainage, or swelling-states understanding to notify surgeon (pt will follow up with Dr Beatrice Zhong most likely as he has seen him in the past)  States eating and drinking well  Confirms isolation through 12/10      Challenges to be reviewed by the provider   Additional needs identified to be addressed with provider No  none    Discussed COVID-19 related testing which was available at this time. Test results were positive. Patient informed of results, if available? Yes         Method of communication with provider : none    Advance Care Planning:   Does patient have an Advance Directive:  done inpt . Was this a readmission? No  Patient stated reason for admission: covid   Patients top risk factors for readmission: lack of knowledge about disease and medical condition    Care Transition Nurse (CTN) contacted the patient by telephone to perform post hospital discharge assessment. Verified name and  with patient as identifiers. Provided introduction to self, and explanation of the CTN role. CTN reviewed discharge instructions, medical action plan and red flags with patient who verbalized understanding.  Patient given an opportunity to ask questions and does not have any further questions or concerns at this time. Were discharge instructions available to patient? Yes. Reviewed appropriate site of care based on symptoms and resources available to patient including: PCP, Specialist, When to call 911 and ctn or [facility]. The patient agrees to contact the PCP office for questions related to their healthcare. Medication reconciliation was performed with patient, who verbalizes understanding of administration of home medications. Advised obtaining a 90-day supply of all daily and as-needed medications. Covid Risk Education    Patient has following risk factors of: diabetes and covid . Education provided regarding infection prevention, and signs and symptoms of COVID-19 and when to seek medical attention with patient who verbalized understanding. Discussed exposure protocols and quarantine From CDC: Are you at higher risk for severe illness?   and given an opportunity for questions and concerns. The patient agrees to contact the COVID-19 hotline [phone] or PCP office for questions related to COVID-19. For more information on steps you can take to protect yourself, see CDC's How to Protect Yourself     Patient/family/caregiver given information for [facility] and agrees to enroll yes  Patient's preferred e-mail: [email or web address]  Patient's preferred phone number: [phone]    Discussed follow-up appointments. If no appointment was previously scheduled, appointment scheduling offered: Yes. Is follow up appointment scheduled within 7 days of discharge? Yes  [facility] follow up appointment(s): 12/9- Dr Jacquelyn Espinosa, 12/10 Dr Jade Cortés     will be followed up by [facility]  based on severity of symptoms and risk factors. Plan for next call: n/a- [facility]  CTN provided contact information for future needs.             Non-face-to-face services provided:  Scheduled appointment with PCP-confirms appt 12/10  Scheduled appointment with Specialist-confirms appt with  Sandra 12/9 (surg) but will likely schedule with Dr Jennie Mcintyre who he has seen in the past  Obtained and reviewed discharge summary and/or continuity of care documents  Assessment and support for treatment adherence and medication management-confirms new meds and states understanding to take until complete    Care Transitions 24 Hour Call    Do you have any ongoing symptoms?:  No  Do you have a copy of your discharge instructions?:  Yes  Do you have all of your prescriptions and are they filled?:  Yes  Have you been contacted by a [facility] [address]?:  No  Have you scheduled your follow up appointment?:  No  Were you discharged with any Home Care or Post Acute Services:  No  Do you feel like you have everything you need to keep you well at home?:  Yes  Care Transitions Interventions         Follow Up  Future Appointments   Date Time Provider Becka Salmon   12/9/2020  2:30 PM Dorothy Moran MD [address] [place] [address]       Michelle Guerrero RN

## 2020-12-04 NOTE — DISCHARGE SUMMARY
[address] [address]                 [address] [place] [id number]                               DISCHARGE SUMMARY    PATIENT NAME: Deyanira Duvall                 :        1950  MED REC NO:   [id number]                             ROOM:       0210  ACCOUNT NO:   [de-identified]                           ADMIT DATE: 2020  PROVIDER:     Shahla Roberts. Kirt Mendes MD                  DISCHARGE DATE:  2020    ATTENDING PHYSICIAN OF HOSPITALIZATION:  Heber Menchaca MD    PERSONAL PHYSICIAN:  Irena Dailey, [facility], [facility]. The patient seen during this hospitalization by Dr. Gabriella Bolton,  General Surgery. DIAGNOSES:  1.  COVID-19 pneumonia, 2020, with hypoxia, fever, tachycardia. Chest x-ray, 2020, no change. Chest x-ray, 2020, patchy  airspace disease consistent with COVID-19. CTA chest/pulmonary,  2020, no pulmonary embolus, moderate emphysema, not typical  COVID-19 pattern, scattered areas of atelectasis. 2.  Sepsis ruled out, 2020, elevated lactic acid, resolving with  minimal hydration. 3.  EKG, 2020, sinus tachycardia, rate 114, PACs, RAD, incomplete  right bundle branch block, RSR prime V1, V2.  4.  Blood culture positive group B Strep agalactiae. 5.  Abdominal skin, former ventral hernia site repair with mesh with  abscess, possible location. 6.  A 2D echo, 2020, no vegetations, normal left ventricular  systolic function, normal hyperdynamic function, mild concentric LVH,  right ventricular systolic function, LVEF approximately 65%. 7.  Muscular right shoulder and neck pain. 8.  Incomplete right bundle-branch block. 9.  Asthma. 10.  Emphysema. 11.  Obstructive sleep apnea, on CPAP. 12.  Hypertension. 13.  Hyperlipidemia. 14.  Diabetes mellitus, uncontrolled, outpatient. 15.  Peripheral arterial disease.   16.  Factor V Leiden mutation, hypercoagulable state, PE by history, DVT  history and an IVC filter. 17.  Depression. 18.  Tobacco abuse, quit in 2004. 19.  Hyponatremia, replacement therapy is given. Other medical problems set forth in the progress note of 12/03/2020,  incorporated for reference herein. HISTORY OF PRESENT ILLNESS AND HOSPITAL COURSE:  The patient is a  73-year-old white male, patient of [facility]. The patient presented with COVID-19 pneumonia, was treated  with a combination of Rocephin, Zithromax, and remdesivir. The patient  is on no steroids. The patient had been on supplemental oxygen together  with CPAP. The patient's hypoxia resolved. The patient was 96% on room  air around the time of discharge. The patient was found to have blood  culture positive for group B strep, possibly as a result of the skin  infection. The patient had a ventral hernia repair done with mesh, had  an abscess which was present, which apparently the patient tried some  home self-surgery and trying to open it, draining some pus. The patient  was seen by Dr. Harsh Beltran this hospitalization. The patient was placed on  vancomycin due to PENICILLIN allergy. The patient had a 2D echo, which demonstrated no vegetations. The  patient was converted to clindamycin at the time of discharge. The  patient had muscular right shoulder and neck pain, for which physical  and occupational therapy was given together with muscle relaxants, p.o. Percocet. Obstructive sleep apnea, on CPAP. Diabetes mellitus, type 2, blood glucose level was elevated. We had  adjusted the patient's insulin regimen to Lantus 40 in the morning and  30 in the evening, log insulin 15/15/15, breakfast, lunch, and evening  meal.  The patient is stable, discharged to home on 12/03/2020. LABORATORY DATA:  Around the time of discharge, white cell count 5.9;  hemoglobin 13.3; hematocrit 41.5; and platelets 06,652.   Sodium 136,  potassium 3.8, chloride 99, CO2 of 24, BUN 11, creatinine 0.71, glucose  in a range from 167 to 222, calcium 8.1, and GFR greater than 60. PTT  48.8. D-dimer in the range from 0.77 to 0.98. Fibrinogen had ranged  from 460 to 612. DISCHARGE INSTRUCTIONS:  FOLLOWUP:  Discharged to home on 12/03/2020. DIET:  Diabetic. ACTIVITY:  As tolerated. CONDITION AT DISCHARGE:  Fair, improved. MEDICATIONS:  Was continued on Eliquis at 5 mg twice a day, the  patient's current regimen, due to a prior history of PE, DVT; Zithromax  500 mg p.o. daily four days; cefdinir (Omnicef) 300 mg p.o. twice daily  four days; clindamycin 300 mg p.o. 4 times a day for 10 days given group  B strep infection; Flexeril (cyclobenzaprine) 5 mg p.o. b.i.d. p.r.n.  muscle spasm; Robitussin DM cough syrup 5 mL p.o. q.4 hours p.r.n.  cough; oxycodone/APAP (Percocet) 5/325 one p.o. q.4 hours p.r.n. pain;  probiotic acidophilus one p.o. three times a day; Advair Diskus 250/50  one inhalation twice daily; albuterol sulfate HFA two puffs every 6  hours p.r.n. shortness of breath, wheezing; atorvastatin (Lipitor) 40 mg  p.o. daily; bupropion (Wellbutrin plain) 100 mg p.o. b.i.d.; Pletal  (cilostazol) 10 mg p.o. b.i.d.; Voltaren Gel 1% topical to joints  b.i.d.; Cardizem  mg p.o. daily; Dulera one inhalation daily;   Farxiga (dapagliflozin) 10 mg daily; finasteride (Proscar) 5 mg p.o.  daily; fish oil OTC one p.o. daily; gabapentin (Neurontin) 100 mg p.o.  b.i.d.; glipizide (Glucotrol XL) 10 mg p.o. b.i.d.; Levemir 50 units  daily; home schedule, the patient was getting lispro insulin 5 units  subcutaneously with meals; lisinopril 20 mg p.o.; loratadine (Claritin)  10 mg p.o. daily; sildenafil (Viagra) p.r.n. erectile dysfunction;  tamsulosin (Flomax) 0.4 mg p.o. daily, note that he may not take  tamsulosin and Viagra during the same 24-hour time period given that  they will both drop the patient's blood pressure; triamcinolone  acetonide (Kenalog) 0.1% paste topical for mouth or throat twice daily; Trulicity (dulaglutide) 1.5 mg weekly; vitamin D3 of 2000 units p.o.  daily. Follow up with the patient's personal physician, K[facility]. Follow up with Dr. Chucky Duane, General  Surgery for the abdominal wound. Any aspect of the patient's care not discussed in the chart and/or  dictation will be addressed and treated as an outpatient. The patient's medications have been reviewed including, but not limited  to, pre-hospital, hospital and discharge medications. The patient  and/or the patient's personal representatives were specifically advised  the only medications to be taken are those set forth in the discharge  orders and no other medications should be taken. Any prior medications  not on the discharge orders are specifically discontinued.         Kristina Messina MD    D: 12/03/2020 17:49:54       T: 12/03/2020 22:12:08     [email or web address]  Job#: [id number]     Doc#: [id number]    CC:

## 2020-12-07 NOTE — PROGRESS NOTES
Physician Progress Note      Jaylen Metzger  CSN #:                  [id number]  :                       1950  ADMIT DATE:       2020 12:25 PM  [address] [place] DATE:        12/3/2020 1:33 PM  RESPONDING  PROVIDER #:        Nabor Layne MD          QUERY TEXT:    Patient admitted and noted to have an abdominal wall abscess. Per procedure   note dated 2020 documentation of I&D. To accurately reflect the procedure performed please further specify the depth   of tissue incised and drained: The medical record reflects the following:  Risk Factors: noted abdominal wall abscess  Clinical Indicators: procedure note An incision was made over the lump, about   3 cm long  Treatment: IV antibiotics and I&D    Thank you,  Linn oCol RN CDI Supervisor  Options provided:  -- Skin only  -- Subcutaneous tissue  -- Other - I will add my own diagnosis  -- Disagree - Not applicable / Not valid  -- Disagree - Clinically unable to determine / Unknown  -- Refer to Clinical Documentation Reviewer    PROVIDER RESPONSE TEXT:    Addendum to 2020 procedure note The depth of the drainage to abdomen was   down to and including subcutaneous tissue.     Query created by: Kacy Ramos on 12/3/2020 7:55 AM      Electronically signed by:  Nabor Layne MD 2020 10:06 AM

## 2020-12-22 ENCOUNTER — HOSPITAL ENCOUNTER (OUTPATIENT)
Dept: WOUND CARE | Age: 70
Discharge: HOME | End: 2020-12-23
Payer: COMMERCIAL

## 2020-12-22 VITALS
HEART RATE: 84 BPM | RESPIRATION RATE: 18 BRPM | TEMPERATURE: 97.2 F | DIASTOLIC BLOOD PRESSURE: 72 MMHG | SYSTOLIC BLOOD PRESSURE: 128 MMHG | HEIGHT: 74 IN | WEIGHT: 266 LBS | BODY MASS INDEX: 34.14 KG/M2

## 2020-12-22 PROBLEM — T81.89XA STITCH GRANULOMA, INITIAL ENCOUNTER: Status: ACTIVE | Noted: 2020-12-22

## 2020-12-22 PROCEDURE — 99024 POSTOP FOLLOW-UP VISIT: CPT | Performed by: SURGERY

## 2020-12-22 RX ORDER — LIDOCAINE HYDROCHLORIDE 20 MG/ML
JELLY TOPICAL ONCE
Status: CANCELLED | OUTPATIENT
Start: 2020-12-22 | End: 2020-12-22

## 2020-12-22 RX ORDER — LIDOCAINE 50 MG/G
OINTMENT TOPICAL ONCE
Status: CANCELLED | OUTPATIENT
Start: 2020-12-22 | End: 2020-12-22

## 2020-12-22 RX ORDER — BACITRACIN ZINC AND POLYMYXIN B SULFATE 500; 1000 [USP'U]/G; [USP'U]/G
OINTMENT TOPICAL ONCE
Status: CANCELLED | OUTPATIENT
Start: 2020-12-22 | End: 2020-12-22

## 2020-12-22 RX ORDER — CLOBETASOL PROPIONATE 0.5 MG/G
OINTMENT TOPICAL ONCE
Status: CANCELLED | OUTPATIENT
Start: 2020-12-22 | End: 2020-12-22

## 2020-12-22 RX ORDER — BETAMETHASONE DIPROPIONATE 0.05 %
OINTMENT (GRAM) TOPICAL ONCE
Status: CANCELLED | OUTPATIENT
Start: 2020-12-22 | End: 2020-12-22

## 2020-12-22 RX ORDER — GINSENG 100 MG
CAPSULE ORAL ONCE
Status: CANCELLED | OUTPATIENT
Start: 2020-12-22 | End: 2020-12-22

## 2020-12-22 RX ORDER — LIDOCAINE HYDROCHLORIDE 40 MG/ML
SOLUTION TOPICAL ONCE
Status: CANCELLED | OUTPATIENT
Start: 2020-12-22 | End: 2020-12-22

## 2020-12-22 RX ORDER — LIDOCAINE 40 MG/G
CREAM TOPICAL ONCE
Status: CANCELLED | OUTPATIENT
Start: 2020-12-22 | End: 2020-12-22

## 2020-12-22 RX ORDER — BACITRACIN, NEOMYCIN, POLYMYXIN B 400; 3.5; 5 [USP'U]/G; MG/G; [USP'U]/G
OINTMENT TOPICAL ONCE
Status: CANCELLED | OUTPATIENT
Start: 2020-12-22 | End: 2020-12-22

## 2020-12-22 RX ORDER — GENTAMICIN SULFATE 1 MG/G
OINTMENT TOPICAL ONCE
Status: CANCELLED | OUTPATIENT
Start: 2020-12-22 | End: 2020-12-22

## 2020-12-22 ASSESSMENT — PAIN DESCRIPTION - PAIN TYPE: TYPE: ACUTE PAIN

## 2020-12-22 ASSESSMENT — PAIN SCALES - GENERAL: PAINLEVEL_OUTOF10: 4

## 2020-12-22 ASSESSMENT — PAIN DESCRIPTION - LOCATION: LOCATION: NECK

## 2020-12-22 NOTE — ADDENDUM NOTE
Encounter addended by: Juventino Abdalla RN on: 12/22/2020 10:49 AM   Actions taken: Diagnosis association updated, Order list changed

## 2020-12-22 NOTE — ADDENDUM NOTE
Encounter addended by: Sharri Forte RN on: 12/22/2020 10:23 AM   Actions taken: Charge Capture section accepted

## 2020-12-22 NOTE — PROGRESS NOTES
Patient is here because he has a little wound on the middle of his midline incision. When he had Covid several months ago he had an abscess in the midline and it was drained by Dr. Jian De Jesus in the hospital.  It looks like it was a stitch granuloma there was a stitch that was poking through. He is here today to have that looked at. He also complained of 2 others that were apparently about ready to poke through the skin. On examination he had a 1 x 1 cm open wound in the middle of the incision in the periumbilical area. There is clearly a stitch of Prolene coming out. We used local anesthetic 1% lidocaine with epinephrine and we prepped and draped in the usual sterile fashion. I then remove the stitch with a 11 blade scalpel. This was done without incident. It to other areas where he could feel a little stitch underneath but he did not want to have it done at this time. He said is not bothering him he did not have openings at those 2 areas at this time.   He will call us in [place] where his insurance is covered and will have them done there if they bother him

## 2021-01-01 ENCOUNTER — APPOINTMENT (OUTPATIENT)
Dept: CT IMAGING | Age: 71
End: 2021-01-01
Payer: COMMERCIAL

## 2021-01-01 ENCOUNTER — HOSPITAL ENCOUNTER (EMERGENCY)
Age: 71
Discharge: HOME | End: 2021-01-01
Attending: EMERGENCY MEDICINE
Payer: COMMERCIAL

## 2021-01-01 VITALS
OXYGEN SATURATION: 92 % | RESPIRATION RATE: 18 BRPM | TEMPERATURE: 98.8 F | HEART RATE: 121 BPM | DIASTOLIC BLOOD PRESSURE: 74 MMHG | SYSTOLIC BLOOD PRESSURE: 122 MMHG

## 2021-01-01 DIAGNOSIS — F10.920 ACUTE ALCOHOLIC INTOXICATION WITHOUT COMPLICATION (HCC): Primary | ICD-10-CM

## 2021-01-01 DIAGNOSIS — R73.9 HYPERGLYCEMIA: ICD-10-CM

## 2021-01-01 DIAGNOSIS — S09.90XA CLOSED HEAD INJURY, INITIAL ENCOUNTER: ICD-10-CM

## 2021-01-01 LAB
-: NORMAL
ABSOLUTE EOS #: 0 K/UL (ref 0–0.4)
ABSOLUTE IMMATURE GRANULOCYTE: 0 K/UL (ref 0–0.3)
ABSOLUTE LYMPH #: 0.5 K/UL (ref 1–4.8)
ABSOLUTE MONO #: 0.72 K/UL (ref 0.1–1.2)
ALBUMIN SERPL-MCNC: 4.3 G/DL (ref 3.5–5.2)
ALBUMIN/GLOBULIN RATIO: 1.3 (ref 1–2.5)
ALP BLD-CCNC: 95 U/L (ref 40–129)
ALT SERPL-CCNC: 19 U/L (ref 5–41)
AMORPHOUS: NORMAL
ANION GAP SERPL CALCULATED.3IONS-SCNC: 16 MMOL/L (ref 9–17)
AST SERPL-CCNC: 11 U/L
BACTERIA: NORMAL
BASOPHILS # BLD: 0 % (ref 0–2)
BASOPHILS ABSOLUTE: 0 K/UL (ref 0–0.2)
BILIRUB SERPL-MCNC: 0.77 MG/DL (ref 0.3–1.2)
BILIRUBIN URINE: NEGATIVE
BUN BLDV-MCNC: 22 MG/DL (ref 8–23)
BUN/CREAT BLD: 21 (ref 9–20)
CALCIUM SERPL-MCNC: 9.7 MG/DL (ref 8.6–10.4)
CASTS UA: NORMAL /LPF (ref 0–2)
CHLORIDE BLD-SCNC: 100 MMOL/L (ref 98–107)
CO2: 18 MMOL/L (ref 20–31)
COLOR: ABNORMAL
COMMENT UA: ABNORMAL
CREAT SERPL-MCNC: 1.04 MG/DL (ref 0.7–1.2)
CRYSTALS, UA: NORMAL /HPF
DIFFERENTIAL TYPE: ABNORMAL
EOSINOPHILS RELATIVE PERCENT: 0 % (ref 1–8)
EPITHELIAL CELLS UA: NORMAL /HPF (ref 0–5)
GFR AFRICAN AMERICAN: >60 ML/MIN
GFR NON-AFRICAN AMERICAN: >60 ML/MIN
GFR SERPL CREATININE-BSD FRML MDRD: ABNORMAL ML/MIN/{1.73_M2}
GFR SERPL CREATININE-BSD FRML MDRD: ABNORMAL ML/MIN/{1.73_M2}
GLUCOSE BLD-MCNC: 345 MG/DL (ref 70–99)
GLUCOSE URINE: ABNORMAL
HCT VFR BLD CALC: 46.7 % (ref 40.7–50.3)
HEMOGLOBIN: 15.7 G/DL (ref 13–17)
IMMATURE GRANULOCYTES: 0 %
KETONES, URINE: NEGATIVE
LACTIC ACID: 1 MMOL/L (ref 0.5–2.2)
LACTIC ACID: 3 MMOL/L (ref 0.5–2.2)
LEUKOCYTE ESTERASE, URINE: NEGATIVE
LYMPHOCYTES # BLD: 7 % (ref 15–43)
MAGNESIUM: 1.9 MG/DL (ref 1.6–2.6)
MCH RBC QN AUTO: 30.1 PG (ref 25.2–33.5)
MCHC RBC AUTO-ENTMCNC: 33.6 G/DL (ref 25.2–33.5)
MCV RBC AUTO: 89.5 FL (ref 82.6–102.9)
MONOCYTES # BLD: 10 % (ref 6–14)
MORPHOLOGY: ABNORMAL
MORPHOLOGY: ABNORMAL
MUCUS: NORMAL
NITRITE, URINE: NEGATIVE
NRBC AUTOMATED: 0 PER 100 WBC
OTHER OBSERVATIONS UA: NORMAL
PDW BLD-RTO: 15.9 % (ref 11.8–14.4)
PH UA: 5.5 (ref 5–6)
PLATELET # BLD: 70 K/UL (ref 138–453)
PLATELET ESTIMATE: ABNORMAL
PMV BLD AUTO: 12.1 FL (ref 8.1–13.5)
POTASSIUM SERPL-SCNC: 4.6 MMOL/L (ref 3.7–5.3)
PROTEIN UA: NEGATIVE
RBC # BLD: 5.22 M/UL (ref 4.21–5.77)
RBC # BLD: ABNORMAL 10*6/UL
RBC UA: NORMAL /HPF (ref 0–4)
RENAL EPITHELIAL, UA: NORMAL /HPF
SEG NEUTROPHILS: 83 % (ref 44–74)
SEGMENTED NEUTROPHILS ABSOLUTE COUNT: 5.98 K/UL (ref 1.5–8.1)
SODIUM BLD-SCNC: 134 MMOL/L (ref 135–144)
SPECIFIC GRAVITY UA: 1.01 (ref 1.01–1.02)
TOTAL PROTEIN: 7.5 G/DL (ref 6.4–8.3)
TRICHOMONAS: NORMAL
TROPONIN INTERP: ABNORMAL
TROPONIN INTERP: ABNORMAL
TROPONIN T: ABNORMAL NG/ML
TROPONIN T: ABNORMAL NG/ML
TROPONIN, HIGH SENSITIVITY: 25 NG/L (ref 0–22)
TROPONIN, HIGH SENSITIVITY: 29 NG/L (ref 0–22)
TURBIDITY: ABNORMAL
URINE HGB: NEGATIVE
UROBILINOGEN, URINE: NORMAL
WBC # BLD: 7.2 K/UL (ref 3.5–11.3)
WBC # BLD: ABNORMAL 10*3/UL
WBC UA: NORMAL /HPF (ref 0–4)
YEAST: NORMAL

## 2021-01-01 PROCEDURE — 2580000003 HC RX 258: Performed by: EMERGENCY MEDICINE

## 2021-01-01 PROCEDURE — 36415 COLL VENOUS BLD VENIPUNCTURE: CPT

## 2021-01-01 PROCEDURE — 80053 COMPREHEN METABOLIC PANEL: CPT

## 2021-01-01 PROCEDURE — 85025 COMPLETE CBC W/AUTO DIFF WBC: CPT

## 2021-01-01 PROCEDURE — 87040 BLOOD CULTURE FOR BACTERIA: CPT

## 2021-01-01 PROCEDURE — 99285 EMERGENCY DEPT VISIT HI MDM: CPT

## 2021-01-01 PROCEDURE — 81001 URINALYSIS AUTO W/SCOPE: CPT

## 2021-01-01 PROCEDURE — 93005 ELECTROCARDIOGRAM TRACING: CPT | Performed by: EMERGENCY MEDICINE

## 2021-01-01 PROCEDURE — 83735 ASSAY OF MAGNESIUM: CPT

## 2021-01-01 PROCEDURE — 70450 CT HEAD/BRAIN W/O DYE: CPT

## 2021-01-01 PROCEDURE — 84484 ASSAY OF TROPONIN QUANT: CPT

## 2021-01-01 PROCEDURE — 6370000000 HC RX 637 (ALT 250 FOR IP): Performed by: EMERGENCY MEDICINE

## 2021-01-01 PROCEDURE — 83605 ASSAY OF LACTIC ACID: CPT

## 2021-01-01 RX ORDER — 0.9 % SODIUM CHLORIDE 0.9 %
1000 INTRAVENOUS SOLUTION INTRAVENOUS ONCE
Status: COMPLETED | OUTPATIENT
Start: 2021-01-01 | End: 2021-01-01

## 2021-01-01 RX ORDER — ACETAMINOPHEN 500 MG
1000 TABLET ORAL ONCE
Status: COMPLETED | OUTPATIENT
Start: 2021-01-01 | End: 2021-01-01

## 2021-01-01 RX ADMIN — SODIUM CHLORIDE 1000 ML: 900 INJECTION, SOLUTION INTRAVENOUS at 09:16

## 2021-01-01 RX ADMIN — ACETAMINOPHEN 1000 MG: 500 TABLET, FILM COATED ORAL at 08:57

## 2021-01-01 ASSESSMENT — ENCOUNTER SYMPTOMS
SHORTNESS OF BREATH: 0
SORE THROAT: 0
VOMITING: 0
DIARRHEA: 0

## 2021-01-01 ASSESSMENT — PAIN DESCRIPTION - LOCATION
LOCATION_2: HEAD
LOCATION: HAND

## 2021-01-01 ASSESSMENT — PAIN SCALES - GENERAL
PAINLEVEL_OUTOF10: 7
PAINLEVEL_OUTOF10: 7
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
## 2021-01-03 LAB
EKG ATRIAL RATE: 139 BPM
EKG P AXIS: 45 DEGREES
EKG P-R INTERVAL: 142 MS
EKG Q-T INTERVAL: 288 MS
EKG QRS DURATION: 102 MS
EKG QTC CALCULATION (BAZETT): 438 MS
EKG R AXIS: -168 DEGREES
EKG T AXIS: 23 DEGREES
EKG VENTRICULAR RATE: 139 BPM

## 2021-01-07 LAB
CULTURE: NORMAL
CULTURE: NORMAL
Lab: NORMAL
Lab: NORMAL
SPECIMEN DESCRIPTION: NORMAL
SPECIMEN DESCRIPTION: NORMAL

## 2022-08-16 ENCOUNTER — TELEPHONE (OUTPATIENT)
Dept: SURGERY | Age: 72
End: 2022-08-16

## 2022-08-16 NOTE — TELEPHONE ENCOUNTER
Patient called stating he had a surgery with Dr Milady Rahman years ago and the mesh is showing through on two areas. States he had seen Dr Milady Rahman in the past for the same thing. Appointment given 9/26-please call patient if he is able to be seen sooner.  Call Home #, patient works 3rd shift

## 2022-08-29 ENCOUNTER — INITIAL CONSULT (OUTPATIENT)
Dept: SURGERY | Age: 72
End: 2022-08-29

## 2022-08-29 VITALS
WEIGHT: 279.2 LBS | OXYGEN SATURATION: 93 % | BODY MASS INDEX: 35.83 KG/M2 | SYSTOLIC BLOOD PRESSURE: 120 MMHG | HEART RATE: 100 BPM | HEIGHT: 74 IN | DIASTOLIC BLOOD PRESSURE: 70 MMHG

## 2022-08-29 DIAGNOSIS — Z09 POSTOP CHECK: Primary | ICD-10-CM

## 2022-08-29 PROCEDURE — 99024 POSTOP FOLLOW-UP VISIT: CPT | Performed by: SURGERY

## 2022-08-29 RX ORDER — FLASH GLUCOSE SENSOR
KIT MISCELLANEOUS
COMMUNITY
Start: 2022-06-24

## 2022-08-29 RX ORDER — INSULIN GLARGINE 300 U/ML
INJECTION, SOLUTION SUBCUTANEOUS
COMMUNITY
Start: 2022-08-20

## 2022-08-29 NOTE — PATIENT INSTRUCTIONS
SIGNS OF INFECTION  - Redness, swelling, skin hot  - Wound bed turns black or stringy yellow  - Foul odor  - Increased drainage or pus  - Increased pain  - Fever greater than 100F    CALL YOUR DOCTOR OR SEEK MEDICAL ATTENTION IF SIGNS OF INFECTION.   DO NOT WAIT UNTIL YOUR NEXT APPOINTMENT    Call the Wound Care Nurse with any other questions or concerns- [phone]

## 2023-06-08 ENCOUNTER — TELEPHONE (OUTPATIENT)
Dept: SURGERY | Age: 73
End: 2023-06-08

## 2023-07-24 ENCOUNTER — TELEPHONE (OUTPATIENT)
Dept: SURGERY | Age: 73
End: 2023-07-24

## 2023-07-24 NOTE — TELEPHONE ENCOUNTER
Torres Liriano phoned stating that he mailed in his cclonoscopy worksheet and would like to schedule for colonoscopy .   He does work third shift but also gave permission for us to talk to his wife regarding scheduling surg for colonoscopy

## 2023-07-24 NOTE — TELEPHONE ENCOUNTER
He also suggestedf that he be scheduled for colonoscopy a [facility] which is were he had it done .

## 2023-07-25 ENCOUNTER — TELEPHONE (OUTPATIENT)
Dept: SURGERY | Age: 73
End: 2023-07-25

## 2023-07-25 NOTE — TELEPHONE ENCOUNTER
Sent medication hold to Cardiology Dr. Carmen Cruz / Dr. Jesse Galvez    Fax: [phone]  Phone: [phone]

## 2023-07-25 NOTE — TELEPHONE ENCOUNTER
Instructions given and review with the patient. All questions answered and patient denies any further questions at this time. Patient scheduled for colonoscopy on 8-18-23. Instructed patient he can purchase the Miralax/Gatorade bowel prep over the counter at his pharmacy.      Sent medication holds to PCP and Cardiology

## 2023-07-25 NOTE — TELEPHONE ENCOUNTER
H &P Colonoscopy  Patient:Ranjeet Wagoner                 :1950  (yes) patient has seen Dr. Mary Alice Carballo prior to procedure  PCP: Dr. Heron Brandt    CC:5 year repeat due to family history of colon cancer and history of polyps        HPI:    ROS:  All 12 systems negative except the positives in the HPI.      Colonoscopy  Abd pain: yes, occasionally  Anemia: no  Bloating:no  Diarrhea: yes at times  Constipation: yes due to medication  Melena: no  Hematochezia:no  Rectal Bleeding:no  Rectal/Anal Pain:no  Pruritus: yes, when constipated  Family history colon Cancer: yes, mother at age of 46  Previous colon cancer: no  Previous Colon Polyp:  adenomatous polyp and hyperplastic change  Change in bowels: no  Decrease caliber of stool: no  Change in color of stool: no    Previous work up date: 8-3-2018 by Dr Mary Alice Carballo with findings of severe diverticulosis but otherwise normal      Family History   Problem Relation Age of Onset    Cancer Mother         colon cancer    Diabetes Father     Kidney Disease Father      Social History     Socioeconomic History    Marital status:      Spouse name: Not on file    Number of children: Not on file    Years of education: Not on file    Highest education level: Not on file   Occupational History    Not on file   Tobacco Use    Smoking status: Former     Types: Cigarettes     Quit date: 2007     Years since quittin.5    Smokeless tobacco: Former     Quit date: 2007   Vaping Use    Vaping Use: Never used   Substance and Sexual Activity    Alcohol use: Yes     Comment: Occasional whiskey on the weekends    Drug use: No    Sexual activity: Not on file   Other Topics Concern    Not on file   Social History Narrative    Not on file     Social Determinants of Health     Financial Resource Strain: Not on file   Food Insecurity: Not on file   Transportation Needs: Not on file   Physical Activity: Not on file   Stress: Not on file   Social Connections: Not on file   Intimate Partner

## 2023-08-29 ENCOUNTER — TELEPHONE (OUTPATIENT)
Dept: SURGERY | Age: 73
End: 2023-08-29

## 2023-08-29 NOTE — TELEPHONE ENCOUNTER
Left message for [facility] surgery Center to cancel colonoscopy. Family will call to reschedule later.

## 2023-08-29 NOTE — TELEPHONE ENCOUNTER
Guillermo Petty phoned to cancel  her dads surg with Dr. Chaya Saldana in [address] [place] [address] as he is currently on his way to [facility] via [facility] with diagnosis of a femoral artery blood clot.

## 2023-09-06 ENCOUNTER — TELEPHONE (OUTPATIENT)
Dept: SURGERY | Age: 73
End: 2023-09-06
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
## 2023-09-07 ENCOUNTER — TELEPHONE (OUTPATIENT)
Dept: SURGERY | Age: 73
End: 2023-09-07

## 2023-09-07 NOTE — TELEPHONE ENCOUNTER
[facility] stating that patient had an acute limb ischemia on 9/05/23, and  recommends patient holding off on his colonoscopy until after November 29th. However, if  feels it is urgent for patient to have the colonoscopy they can reconsider clearing patient for surgery. Can contact [facility] with any questions at # [phone].

## 2023-09-07 NOTE — TELEPHONE ENCOUNTER
Called patient's wife moved CS to 12/15/2023. Notified surgery center. Also will place reminder in November to send clearance to Dr. Pineda Going office.

## 2023-11-15 RX ORDER — TRIMETHOPRIM 100 MG/1
100 TABLET ORAL 2 TIMES DAILY
COMMUNITY
Start: 2023-08-27

## 2023-11-15 RX ORDER — GABAPENTIN 300 MG/1
CAPSULE ORAL
COMMUNITY
Start: 2023-08-27

## 2023-11-15 RX ORDER — FUROSEMIDE 20 MG/1
20 TABLET ORAL DAILY
Qty: 30 TABLET | Refills: 9 | COMMUNITY
Start: 2023-06-29 | End: 2024-04-23

## 2023-11-15 RX ORDER — ACETAMINOPHEN 325 MG/1
650 TABLET ORAL EVERY 6 HOURS PRN
COMMUNITY

## 2023-11-15 RX ORDER — TRAMADOL HYDROCHLORIDE 50 MG/1
50 TABLET ORAL EVERY 6 HOURS PRN
COMMUNITY
Start: 2023-10-25 | End: 2024-01-23

## 2023-11-15 RX ORDER — CARVEDILOL 6.25 MG/1
6.25 TABLET ORAL
COMMUNITY
Start: 2023-10-26 | End: 2024-10-25

## 2023-12-01 ENCOUNTER — TELEPHONE (OUTPATIENT)
Dept: SURGERY | Age: 73
End: 2023-12-01

## 2023-12-01 NOTE — TELEPHONE ENCOUNTER
Left message on [facility] OR voicemail notifying their department patient is canceling and will call back to reschedule when he is ready.

## 2023-12-01 NOTE — TELEPHONE ENCOUNTER
Patient called the office to cancel his up coming colonoscopy with Dr Yoselin Rowe. Patient states he is scheduled 12/15/23 @ [address]. Patient is having some vascular issues. Patient will call back when he is ready to reschedule.
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
## 2024-12-14 ENCOUNTER — HOSPITAL ENCOUNTER (INPATIENT)
Age: 74
LOS: 1 days | Discharge: HOME | DRG: 190 | End: 2024-12-16
Attending: STUDENT IN AN ORGANIZED HEALTH CARE EDUCATION/TRAINING PROGRAM | Admitting: INTERNAL MEDICINE
Payer: COMMERCIAL

## 2024-12-14 ENCOUNTER — APPOINTMENT (OUTPATIENT)
Dept: CT IMAGING | Age: 74
DRG: 190 | End: 2024-12-14
Payer: COMMERCIAL

## 2024-12-14 ENCOUNTER — APPOINTMENT (OUTPATIENT)
Dept: GENERAL RADIOLOGY | Age: 74
DRG: 190 | End: 2024-12-14
Payer: COMMERCIAL

## 2024-12-14 DIAGNOSIS — R09.89 PULMONARY VASCULAR CONGESTION: ICD-10-CM

## 2024-12-14 DIAGNOSIS — J44.1 COPD WITH ACUTE EXACERBATION (HCC): ICD-10-CM

## 2024-12-14 DIAGNOSIS — I73.9 PAD (PERIPHERAL ARTERY DISEASE) (HCC): ICD-10-CM

## 2024-12-14 DIAGNOSIS — R06.2 WHEEZING: ICD-10-CM

## 2024-12-14 DIAGNOSIS — I50.9 ACUTE HEART FAILURE, UNSPECIFIED HEART FAILURE TYPE (HCC): Primary | ICD-10-CM

## 2024-12-14 DIAGNOSIS — R06.02 SHORTNESS OF BREATH: ICD-10-CM

## 2024-12-14 DIAGNOSIS — R79.89 ELEVATED TROPONIN: ICD-10-CM

## 2024-12-14 DIAGNOSIS — J44.1 COPD EXACERBATION (HCC): ICD-10-CM

## 2024-12-14 DIAGNOSIS — R09.02 HYPOXIA: ICD-10-CM

## 2024-12-14 LAB
ALBUMIN SERPL-MCNC: 4.3 G/DL (ref 3.5–5.2)
ALBUMIN/GLOB SERPL: 1.3 {RATIO} (ref 1–2.5)
ALP SERPL-CCNC: 130 U/L (ref 40–129)
ALT SERPL-CCNC: 23 U/L (ref 5–41)
ANION GAP SERPL CALCULATED.3IONS-SCNC: 12 MMOL/L (ref 9–17)
AST SERPL-CCNC: 19 U/L
BACTERIA URNS QL MICRO: ABNORMAL
BASOPHILS # BLD: <0.03 K/UL (ref 0–0.2)
BASOPHILS NFR BLD: 0 % (ref 0–2)
BILIRUB DIRECT SERPL-MCNC: 0.2 MG/DL
BILIRUB INDIRECT SERPL-MCNC: 0.8 MG/DL (ref 0–1)
BILIRUB SERPL-MCNC: 1 MG/DL (ref 0.3–1.2)
BILIRUB UR QL STRIP: NEGATIVE
BNP SERPL-MCNC: 89 PG/ML
BUN SERPL-MCNC: 16 MG/DL (ref 8–23)
BUN/CREAT SERPL: 15 (ref 9–20)
CALCIUM SERPL-MCNC: 8.7 MG/DL (ref 8.6–10.4)
CHLORIDE SERPL-SCNC: 100 MMOL/L (ref 98–107)
CLARITY UR: CLEAR
CO2 SERPL-SCNC: 26 MMOL/L (ref 20–31)
COLOR UR: YELLOW
CREAT SERPL-MCNC: 1.1 MG/DL (ref 0.7–1.2)
D DIMER PPP FEU-MCNC: 0.85 UG/ML FEU (ref 0–0.59)
EOSINOPHIL # BLD: <0.03 K/UL (ref 0–0.44)
EOSINOPHILS RELATIVE PERCENT: 0 % (ref 1–4)
EPI CELLS #/AREA URNS HPF: ABNORMAL /HPF (ref 0–5)
ERYTHROCYTE [DISTWIDTH] IN BLOOD BY AUTOMATED COUNT: 15.8 % (ref 11.8–14.4)
FLUAV AG SPEC QL: NEGATIVE
FLUBV AG SPEC QL: NEGATIVE
GFR, ESTIMATED: 71 ML/MIN/1.73M2
GLOBULIN SER CALC-MCNC: 3.4 G/DL (ref 1.5–3.8)
GLUCOSE SERPL-MCNC: 222 MG/DL (ref 70–99)
GLUCOSE UR STRIP-MCNC: ABNORMAL MG/DL
HCT VFR BLD AUTO: 42 % (ref 40.7–50.3)
HGB BLD-MCNC: 14.3 G/DL (ref 13–17)
HGB UR QL STRIP.AUTO: NEGATIVE
IMM GRANULOCYTES # BLD AUTO: 0.04 K/UL (ref 0–0.3)
IMM GRANULOCYTES NFR BLD: 0 %
KETONES UR STRIP-MCNC: NEGATIVE MG/DL
LACTATE BLDV-SCNC: 2.5 MMOL/L (ref 0.5–2.2)
LEUKOCYTE ESTERASE UR QL STRIP: ABNORMAL
LIPASE SERPL-CCNC: 24 U/L (ref 13–60)
LYMPHOCYTES NFR BLD: 1.33 K/UL (ref 1.1–3.7)
LYMPHOCYTES RELATIVE PERCENT: 14 % (ref 24–43)
MAGNESIUM SERPL-MCNC: 1.6 MG/DL (ref 1.6–2.6)
MCH RBC QN AUTO: 31.2 PG (ref 25.2–33.5)
MCHC RBC AUTO-ENTMCNC: 34 G/DL (ref 25.2–33.5)
MCV RBC AUTO: 91.7 FL (ref 82.6–102.9)
MONOCYTES NFR BLD: 0.75 K/UL (ref 0.1–1.2)
MONOCYTES NFR BLD: 8 % (ref 3–12)
NEUTROPHILS NFR BLD: 78 % (ref 36–65)
NEUTS SEG NFR BLD: 7.48 K/UL (ref 1.5–8.1)
NITRITE UR QL STRIP: NEGATIVE
NRBC BLD-RTO: 0 PER 100 WBC
PH UR STRIP: 5.5 [PH] (ref 5–6)
PLATELET # BLD AUTO: ABNORMAL K/UL (ref 138–453)
PLATELET, FLUORESCENCE: 117 K/UL (ref 138–453)
PLATELETS.RETICULATED NFR BLD AUTO: 9.3 % (ref 1.1–10.3)
POTASSIUM SERPL-SCNC: 4.1 MMOL/L (ref 3.7–5.3)
PROT SERPL-MCNC: 7.7 G/DL (ref 6.4–8.3)
PROT UR STRIP-MCNC: ABNORMAL MG/DL
RBC # BLD AUTO: 4.58 M/UL (ref 4.21–5.77)
RBC #/AREA URNS HPF: ABNORMAL /HPF (ref 0–4)
SARS-COV-2 RDRP RESP QL NAA+PROBE: NOT DETECTED
SODIUM SERPL-SCNC: 138 MMOL/L (ref 135–144)
SP GR UR STRIP: 1.03 (ref 1.01–1.02)
SPECIMEN DESCRIPTION: NORMAL
TROPONIN I SERPL HS-MCNC: 24 NG/L (ref 0–22)
TROPONIN I SERPL HS-MCNC: 26 NG/L (ref 0–22)
UROBILINOGEN UR STRIP-ACNC: NORMAL EU/DL (ref 0–1)
WBC #/AREA URNS HPF: ABNORMAL /HPF (ref 0–4)
WBC OTHER # BLD: 9.6 K/UL (ref 3.5–11.3)

## 2024-12-14 PROCEDURE — 96374 THER/PROPH/DIAG INJ IV PUSH: CPT

## 2024-12-14 PROCEDURE — 36415 COLL VENOUS BLD VENIPUNCTURE: CPT

## 2024-12-14 PROCEDURE — 80076 HEPATIC FUNCTION PANEL: CPT

## 2024-12-14 PROCEDURE — 83690 ASSAY OF LIPASE: CPT

## 2024-12-14 PROCEDURE — 83605 ASSAY OF LACTIC ACID: CPT

## 2024-12-14 PROCEDURE — 87635 SARS-COV-2 COVID-19 AMP PRB: CPT

## 2024-12-14 PROCEDURE — 83880 ASSAY OF NATRIURETIC PEPTIDE: CPT

## 2024-12-14 PROCEDURE — 87040 BLOOD CULTURE FOR BACTERIA: CPT

## 2024-12-14 PROCEDURE — 6370000000 HC RX 637 (ALT 250 FOR IP)

## 2024-12-14 PROCEDURE — 85379 FIBRIN DEGRADATION QUANT: CPT

## 2024-12-14 PROCEDURE — 93005 ELECTROCARDIOGRAM TRACING: CPT | Performed by: STUDENT IN AN ORGANIZED HEALTH CARE EDUCATION/TRAINING PROGRAM

## 2024-12-14 PROCEDURE — 6360000002 HC RX W HCPCS: Performed by: STUDENT IN AN ORGANIZED HEALTH CARE EDUCATION/TRAINING PROGRAM

## 2024-12-14 PROCEDURE — 6360000004 HC RX CONTRAST MEDICATION: Performed by: STUDENT IN AN ORGANIZED HEALTH CARE EDUCATION/TRAINING PROGRAM

## 2024-12-14 PROCEDURE — 94640 AIRWAY INHALATION TREATMENT: CPT

## 2024-12-14 PROCEDURE — 80048 BASIC METABOLIC PNL TOTAL CA: CPT

## 2024-12-14 PROCEDURE — 6370000000 HC RX 637 (ALT 250 FOR IP): Performed by: STUDENT IN AN ORGANIZED HEALTH CARE EDUCATION/TRAINING PROGRAM

## 2024-12-14 PROCEDURE — 81001 URINALYSIS AUTO W/SCOPE: CPT

## 2024-12-14 PROCEDURE — 84443 ASSAY THYROID STIM HORMONE: CPT

## 2024-12-14 PROCEDURE — 87077 CULTURE AEROBIC IDENTIFY: CPT

## 2024-12-14 PROCEDURE — 87804 INFLUENZA ASSAY W/OPTIC: CPT

## 2024-12-14 PROCEDURE — 84484 ASSAY OF TROPONIN QUANT: CPT

## 2024-12-14 PROCEDURE — 99285 EMERGENCY DEPT VISIT HI MDM: CPT

## 2024-12-14 PROCEDURE — 87086 URINE CULTURE/COLONY COUNT: CPT

## 2024-12-14 PROCEDURE — 71260 CT THORAX DX C+: CPT

## 2024-12-14 PROCEDURE — 71045 X-RAY EXAM CHEST 1 VIEW: CPT

## 2024-12-14 PROCEDURE — 85025 COMPLETE CBC W/AUTO DIFF WBC: CPT

## 2024-12-14 PROCEDURE — 83735 ASSAY OF MAGNESIUM: CPT

## 2024-12-14 RX ORDER — APIXABAN 2.5 MG/1
TABLET, FILM COATED ORAL
COMMUNITY
Start: 2024-12-09 | End: 2024-12-14

## 2024-12-14 RX ORDER — INSULIN LISPRO-AABC 100 [IU]/ML
INJECTION, SOLUTION SUBCUTANEOUS
COMMUNITY
Start: 2024-11-08

## 2024-12-14 RX ORDER — CARVEDILOL 3.12 MG/1
6.25 TABLET ORAL ONCE
Status: COMPLETED | OUTPATIENT
Start: 2024-12-14 | End: 2024-12-14

## 2024-12-14 RX ORDER — ASPIRIN 81 MG/1
324 TABLET, CHEWABLE ORAL ONCE
Status: COMPLETED | OUTPATIENT
Start: 2024-12-14 | End: 2024-12-14

## 2024-12-14 RX ORDER — METHYLPREDNISOLONE SODIUM SUCCINATE 125 MG/2ML
125 INJECTION INTRAMUSCULAR; INTRAVENOUS ONCE
Status: COMPLETED | OUTPATIENT
Start: 2024-12-14 | End: 2024-12-14

## 2024-12-14 RX ORDER — CYCLOBENZAPRINE HCL 10 MG
10 TABLET ORAL 2 TIMES DAILY PRN
COMMUNITY
Start: 2024-12-09 | End: 2025-02-07

## 2024-12-14 RX ORDER — TRAMADOL HYDROCHLORIDE 50 MG/1
50 TABLET ORAL EVERY 6 HOURS PRN
COMMUNITY
Start: 2024-03-22

## 2024-12-14 RX ORDER — IPRATROPIUM BROMIDE AND ALBUTEROL SULFATE 2.5; .5 MG/3ML; MG/3ML
1 SOLUTION RESPIRATORY (INHALATION) ONCE
Status: COMPLETED | OUTPATIENT
Start: 2024-12-14 | End: 2024-12-14

## 2024-12-14 RX ORDER — LOPERAMIDE HYDROCHLORIDE 2 MG/1
2 CAPSULE ORAL 4 TIMES DAILY PRN
Status: ON HOLD | COMMUNITY
End: 2024-12-16 | Stop reason: HOSPADM

## 2024-12-14 RX ORDER — IOPAMIDOL 755 MG/ML
100 INJECTION, SOLUTION INTRAVASCULAR
Status: COMPLETED | OUTPATIENT
Start: 2024-12-14 | End: 2024-12-14

## 2024-12-14 RX ORDER — IPRATROPIUM BROMIDE AND ALBUTEROL SULFATE 2.5; .5 MG/3ML; MG/3ML
SOLUTION RESPIRATORY (INHALATION)
Status: COMPLETED
Start: 2024-12-14 | End: 2024-12-14

## 2024-12-14 RX ORDER — ASPIRIN 81 MG/1
TABLET, CHEWABLE ORAL
Status: COMPLETED
Start: 2024-12-14 | End: 2024-12-14

## 2024-12-14 RX ORDER — CILOSTAZOL 100 MG/1
100 TABLET ORAL 2 TIMES DAILY
COMMUNITY
Start: 2024-12-07

## 2024-12-14 RX ORDER — METHYLPREDNISOLONE SODIUM SUCCINATE 125 MG/2ML
125 INJECTION INTRAMUSCULAR; INTRAVENOUS EVERY 6 HOURS
Status: DISCONTINUED | OUTPATIENT
Start: 2024-12-14 | End: 2024-12-14

## 2024-12-14 RX ADMIN — CARVEDILOL 6.25 MG: 3.12 TABLET, FILM COATED ORAL at 23:42

## 2024-12-14 RX ADMIN — ASPIRIN 324 MG: 81 TABLET, CHEWABLE ORAL at 20:36

## 2024-12-14 RX ADMIN — METHYLPREDNISOLONE SODIUM SUCCINATE 125 MG: 125 INJECTION INTRAMUSCULAR; INTRAVENOUS at 20:29

## 2024-12-14 RX ADMIN — IPRATROPIUM BROMIDE AND ALBUTEROL SULFATE: .5; 3 SOLUTION RESPIRATORY (INHALATION) at 20:25

## 2024-12-14 RX ADMIN — IPRATROPIUM BROMIDE AND ALBUTEROL SULFATE 1 DOSE: 2.5; .5 SOLUTION RESPIRATORY (INHALATION) at 20:30

## 2024-12-14 RX ADMIN — APIXABAN 2.5 MG: 5 TABLET, FILM COATED ORAL at 23:42

## 2024-12-14 RX ADMIN — IOPAMIDOL 100 ML: 755 INJECTION, SOLUTION INTRAVENOUS at 22:13

## 2024-12-14 RX ADMIN — ASPIRIN 81 MG 324 MG: 81 TABLET ORAL at 20:36

## 2024-12-14 ASSESSMENT — LIFESTYLE VARIABLES
HOW OFTEN DO YOU HAVE A DRINK CONTAINING ALCOHOL: NEVER
HOW MANY STANDARD DRINKS CONTAINING ALCOHOL DO YOU HAVE ON A TYPICAL DAY: PATIENT DOES NOT DRINK

## 2024-12-14 ASSESSMENT — PAIN - FUNCTIONAL ASSESSMENT: PAIN_FUNCTIONAL_ASSESSMENT: NONE - DENIES PAIN

## 2024-12-15 PROBLEM — L97.422 DIABETIC ULCER OF LEFT MIDFOOT ASSOCIATED WITH TYPE 2 DIABETES MELLITUS, WITH FAT LAYER EXPOSED (HCC): Status: RESOLVED | Noted: 2023-11-07 | Resolved: 2024-12-15

## 2024-12-15 PROBLEM — M48.02 SPINAL STENOSIS, CERVICAL REGION: Status: RESOLVED | Noted: 2023-03-20 | Resolved: 2024-12-15

## 2024-12-15 PROBLEM — E11.9 TYPE 2 DIABETES MELLITUS (HCC): Status: ACTIVE | Noted: 2023-12-03

## 2024-12-15 PROBLEM — L97.509: Status: RESOLVED | Noted: 2023-07-08 | Resolved: 2024-12-15

## 2024-12-15 PROBLEM — N30.00 ACUTE CYSTITIS WITHOUT HEMATURIA: Status: RESOLVED | Noted: 2024-10-17 | Resolved: 2024-12-15

## 2024-12-15 PROBLEM — E11.621 DIABETIC ULCER OF LEFT MIDFOOT ASSOCIATED WITH TYPE 2 DIABETES MELLITUS, WITH FAT LAYER EXPOSED (HCC): Status: RESOLVED | Noted: 2023-11-07 | Resolved: 2024-12-15

## 2024-12-15 PROBLEM — R39.14 FEELING OF INCOMPLETE BLADDER EMPTYING: Status: RESOLVED | Noted: 2021-10-19 | Resolved: 2024-12-15

## 2024-12-15 PROBLEM — R60.9 EDEMA: Status: RESOLVED | Noted: 2022-11-18 | Resolved: 2024-12-15

## 2024-12-15 PROBLEM — N18.31 CHRONIC KIDNEY DISEASE, STAGE 3A (HCC): Status: ACTIVE | Noted: 2024-01-03

## 2024-12-15 PROBLEM — Z89.432 S/P TRANSMETATARSAL AMPUTATION OF FOOT, LEFT (HCC): Status: RESOLVED | Noted: 2024-02-09 | Resolved: 2024-12-15

## 2024-12-15 PROBLEM — A09 INFECTIOUS GASTROENTERITIS AND COLITIS, UNSPECIFIED: Status: RESOLVED | Noted: 2024-11-20 | Resolved: 2024-12-15

## 2024-12-15 PROBLEM — R53.1 WEAKNESS: Status: RESOLVED | Noted: 2023-09-16 | Resolved: 2024-12-15

## 2024-12-15 PROBLEM — D69.3 CHRONIC IDIOPATHIC THROMBOCYTOPENIA (HCC): Status: ACTIVE | Noted: 2023-09-16

## 2024-12-15 PROBLEM — M21.862 ACQUIRED POSTERIOR EQUINUS, LEFT: Status: RESOLVED | Noted: 2024-02-09 | Resolved: 2024-12-15

## 2024-12-15 PROBLEM — M48.02 CERVICAL SPINAL STENOSIS: Status: RESOLVED | Noted: 2022-11-14 | Resolved: 2024-12-15

## 2024-12-15 PROBLEM — I96 GANGRENE, NOT ELSEWHERE CLASSIFIED (HCC): Status: RESOLVED | Noted: 2023-12-05 | Resolved: 2024-12-15

## 2024-12-15 PROBLEM — E87.1 HYPONATREMIA: Status: RESOLVED | Noted: 2024-10-18 | Resolved: 2024-12-15

## 2024-12-15 PROBLEM — N28.9 DISORDER OF KIDNEY AND URETER, UNSPECIFIED: Status: RESOLVED | Noted: 2023-12-14 | Resolved: 2024-12-15

## 2024-12-15 PROBLEM — M86.171 ACUTE OSTEOMYELITIS OF TOE, RIGHT: Status: RESOLVED | Noted: 2024-08-30 | Resolved: 2024-12-15

## 2024-12-15 PROBLEM — E66.812 OBESITY, CLASS II, BMI 35-39.9: Status: ACTIVE | Noted: 2021-06-01

## 2024-12-15 PROBLEM — R35.0 URINARY FREQUENCY: Status: RESOLVED | Noted: 2021-06-01 | Resolved: 2024-12-15

## 2024-12-15 PROBLEM — E34.9 TESTOSTERONE DEFICIENCY: Status: RESOLVED | Noted: 2024-05-16 | Resolved: 2024-12-15

## 2024-12-15 PROBLEM — S98.312A: Status: RESOLVED | Noted: 2023-12-14 | Resolved: 2024-12-15

## 2024-12-15 PROBLEM — E27.3 DRUG-INDUCED ADRENOCORTICAL INSUFFICIENCY (HCC): Status: RESOLVED | Noted: 2023-03-20 | Resolved: 2024-12-15

## 2024-12-15 PROBLEM — E08.8 DIABETES MELLITUS DUE TO UNDERLYING CONDITION WITH COMPLICATIONS (HCC): Status: ACTIVE | Noted: 2024-10-24

## 2024-12-15 PROBLEM — T38.0X5A ADVERSE EFFECT OF GLUCOCORTICOIDS AND SYNTHETIC ANALOGUES, INITIAL ENCOUNTER: Status: RESOLVED | Noted: 2023-03-20 | Resolved: 2024-12-15

## 2024-12-15 PROBLEM — N39.0 SEPSIS SECONDARY TO UTI (HCC): Status: RESOLVED | Noted: 2023-07-07 | Resolved: 2024-12-15

## 2024-12-15 PROBLEM — I50.9 ACUTE HEART FAILURE (HCC): Status: ACTIVE | Noted: 2024-12-15

## 2024-12-15 PROBLEM — K76.0 FATTY (CHANGE OF) LIVER, NOT ELSEWHERE CLASSIFIED: Status: RESOLVED | Noted: 2023-03-20 | Resolved: 2024-12-15

## 2024-12-15 PROBLEM — A41.9 SEPSIS SECONDARY TO UTI (HCC): Status: RESOLVED | Noted: 2023-07-07 | Resolved: 2024-12-15

## 2024-12-15 PROBLEM — I73.9 PVD (PERIPHERAL VASCULAR DISEASE) WITH CLAUDICATION (HCC): Status: ACTIVE | Noted: 2019-05-10

## 2024-12-15 PROBLEM — S98.319A AMPUTATION AT MIDFOOT (HCC): Status: RESOLVED | Noted: 2023-12-14 | Resolved: 2024-12-15

## 2024-12-15 PROBLEM — R26.9 GAIT DIFFICULTY: Status: RESOLVED | Noted: 2024-02-09 | Resolved: 2024-12-15

## 2024-12-15 PROBLEM — J44.1 COPD WITH ACUTE EXACERBATION (HCC): Status: ACTIVE | Noted: 2024-12-15

## 2024-12-15 PROBLEM — D46.9 MDS (MYELODYSPLASTIC SYNDROME) (HCC): Status: ACTIVE | Noted: 2024-01-24

## 2024-12-15 PROBLEM — D73.9 SPLEEN DISORDER: Status: RESOLVED | Noted: 2024-01-25 | Resolved: 2024-12-15

## 2024-12-15 PROBLEM — I50.9 ACUTE HEART FAILURE, UNSPECIFIED HEART FAILURE TYPE (HCC): Status: ACTIVE | Noted: 2024-12-15

## 2024-12-15 PROBLEM — I50.9 ACUTE HEART FAILURE (HCC): Status: RESOLVED | Noted: 2024-12-15 | Resolved: 2024-12-15

## 2024-12-15 LAB
ALBUMIN SERPL-MCNC: 3.9 G/DL (ref 3.5–5.2)
ALBUMIN/GLOB SERPL: 1.3 {RATIO} (ref 1–2.5)
ALP SERPL-CCNC: 105 U/L (ref 40–129)
ALT SERPL-CCNC: 19 U/L (ref 5–41)
ANION GAP SERPL CALCULATED.3IONS-SCNC: 13 MMOL/L (ref 9–17)
AST SERPL-CCNC: 14 U/L
BASOPHILS # BLD: <0.03 K/UL (ref 0–0.2)
BASOPHILS NFR BLD: 0 % (ref 0–2)
BILIRUB DIRECT SERPL-MCNC: 0.2 MG/DL
BILIRUB INDIRECT SERPL-MCNC: 0.6 MG/DL (ref 0–1)
BILIRUB SERPL-MCNC: 0.8 MG/DL (ref 0.3–1.2)
BUN SERPL-MCNC: 21 MG/DL (ref 8–23)
BUN/CREAT SERPL: 18 (ref 9–20)
CALCIUM SERPL-MCNC: 8.4 MG/DL (ref 8.6–10.4)
CHLORIDE SERPL-SCNC: 98 MMOL/L (ref 98–107)
CHOLEST SERPL-MCNC: 89 MG/DL (ref 0–199)
CHOLESTEROL/HDL RATIO: 3.7
CO2 SERPL-SCNC: 22 MMOL/L (ref 20–31)
CREAT SERPL-MCNC: 1.2 MG/DL (ref 0.7–1.2)
EKG ATRIAL RATE: 102 BPM
EKG ATRIAL RATE: 148 BPM
EKG P AXIS: 32 DEGREES
EKG P AXIS: 73 DEGREES
EKG P-R INTERVAL: 150 MS
EKG P-R INTERVAL: 172 MS
EKG Q-T INTERVAL: 334 MS
EKG Q-T INTERVAL: 374 MS
EKG QRS DURATION: 110 MS
EKG QRS DURATION: 82 MS
EKG QTC CALCULATION (BAZETT): 435 MS
EKG QTC CALCULATION (BAZETT): 587 MS
EKG R AXIS: -149 DEGREES
EKG R AXIS: -59 DEGREES
EKG T AXIS: 60 DEGREES
EKG T AXIS: 97 DEGREES
EKG VENTRICULAR RATE: 102 BPM
EKG VENTRICULAR RATE: 148 BPM
EOSINOPHIL # BLD: <0.03 K/UL (ref 0–0.44)
EOSINOPHILS RELATIVE PERCENT: 0 % (ref 1–4)
ERYTHROCYTE [DISTWIDTH] IN BLOOD BY AUTOMATED COUNT: 15.8 % (ref 11.8–14.4)
GFR, ESTIMATED: 64 ML/MIN/1.73M2
GLOBULIN SER CALC-MCNC: 2.9 G/DL (ref 1.5–3.8)
GLUCOSE BLD-MCNC: 269 MG/DL (ref 75–110)
GLUCOSE BLD-MCNC: 363 MG/DL (ref 75–110)
GLUCOSE BLD-MCNC: 473 MG/DL (ref 75–110)
GLUCOSE BLD-MCNC: 474 MG/DL (ref 75–110)
GLUCOSE BLD-MCNC: 504 MG/DL (ref 75–110)
GLUCOSE BLD-MCNC: 525 MG/DL (ref 75–110)
GLUCOSE SERPL-MCNC: 337 MG/DL (ref 70–99)
HCT VFR BLD AUTO: 37.9 % (ref 40.7–50.3)
HDLC SERPL-MCNC: 24 MG/DL
HGB BLD-MCNC: 12.8 G/DL (ref 13–17)
IMM GRANULOCYTES # BLD AUTO: 0.09 K/UL (ref 0–0.3)
IMM GRANULOCYTES NFR BLD: 1 %
LACTATE BLDV-SCNC: 1 MMOL/L (ref 0.5–2.2)
LDLC SERPL CALC-MCNC: 46 MG/DL (ref 0–100)
LYMPHOCYTES NFR BLD: 0.55 K/UL (ref 1.1–3.7)
LYMPHOCYTES RELATIVE PERCENT: 4 % (ref 24–43)
MAGNESIUM SERPL-MCNC: 1.7 MG/DL (ref 1.6–2.6)
MCH RBC QN AUTO: 30.8 PG (ref 25.2–33.5)
MCHC RBC AUTO-ENTMCNC: 33.8 G/DL (ref 25.2–33.5)
MCV RBC AUTO: 91.3 FL (ref 82.6–102.9)
MONOCYTES NFR BLD: 0.77 K/UL (ref 0.1–1.2)
MONOCYTES NFR BLD: 5 % (ref 3–12)
NEUTROPHILS NFR BLD: 91 % (ref 36–65)
NEUTS SEG NFR BLD: 14.08 K/UL (ref 1.5–8.1)
NRBC BLD-RTO: 0 PER 100 WBC
PLATELET # BLD AUTO: 111 K/UL (ref 138–453)
PMV BLD AUTO: 12.3 FL (ref 8.1–13.5)
POTASSIUM SERPL-SCNC: 4.5 MMOL/L (ref 3.7–5.3)
PROT SERPL-MCNC: 6.8 G/DL (ref 6.4–8.3)
RBC # BLD AUTO: 4.15 M/UL (ref 4.21–5.77)
SODIUM SERPL-SCNC: 133 MMOL/L (ref 135–144)
TRIGL SERPL-MCNC: 97 MG/DL
TROPONIN I SERPL HS-MCNC: 21 NG/L (ref 0–22)
TROPONIN I SERPL HS-MCNC: 22 NG/L (ref 0–22)
TSH SERPL DL<=0.05 MIU/L-ACNC: 1.58 UIU/ML (ref 0.3–5)
VLDLC SERPL CALC-MCNC: 19 MG/DL (ref 1–30)
WBC OTHER # BLD: 15.5 K/UL (ref 3.5–11.3)

## 2024-12-15 PROCEDURE — 6370000000 HC RX 637 (ALT 250 FOR IP): Performed by: INTERNAL MEDICINE

## 2024-12-15 PROCEDURE — 80061 LIPID PANEL: CPT

## 2024-12-15 PROCEDURE — 6360000002 HC RX W HCPCS

## 2024-12-15 PROCEDURE — 6370000000 HC RX 637 (ALT 250 FOR IP): Performed by: STUDENT IN AN ORGANIZED HEALTH CARE EDUCATION/TRAINING PROGRAM

## 2024-12-15 PROCEDURE — 2580000003 HC RX 258

## 2024-12-15 PROCEDURE — 99232 SBSQ HOSP IP/OBS MODERATE 35: CPT | Performed by: INTERNAL MEDICINE

## 2024-12-15 PROCEDURE — 2000000000 HC ICU R&B

## 2024-12-15 PROCEDURE — 2060000000 HC ICU INTERMEDIATE R&B

## 2024-12-15 PROCEDURE — 99222 1ST HOSP IP/OBS MODERATE 55: CPT

## 2024-12-15 PROCEDURE — 36415 COLL VENOUS BLD VENIPUNCTURE: CPT

## 2024-12-15 PROCEDURE — 85025 COMPLETE CBC W/AUTO DIFF WBC: CPT

## 2024-12-15 PROCEDURE — 80048 BASIC METABOLIC PNL TOTAL CA: CPT

## 2024-12-15 PROCEDURE — 80076 HEPATIC FUNCTION PANEL: CPT

## 2024-12-15 PROCEDURE — 84484 ASSAY OF TROPONIN QUANT: CPT

## 2024-12-15 PROCEDURE — 93005 ELECTROCARDIOGRAM TRACING: CPT | Performed by: INTERNAL MEDICINE

## 2024-12-15 PROCEDURE — 94760 N-INVAS EAR/PLS OXIMETRY 1: CPT

## 2024-12-15 PROCEDURE — 83605 ASSAY OF LACTIC ACID: CPT

## 2024-12-15 PROCEDURE — 6370000000 HC RX 637 (ALT 250 FOR IP)

## 2024-12-15 PROCEDURE — 6360000002 HC RX W HCPCS: Performed by: INTERNAL MEDICINE

## 2024-12-15 PROCEDURE — 2700000000 HC OXYGEN THERAPY PER DAY

## 2024-12-15 PROCEDURE — 83735 ASSAY OF MAGNESIUM: CPT

## 2024-12-15 PROCEDURE — 82947 ASSAY GLUCOSE BLOOD QUANT: CPT

## 2024-12-15 PROCEDURE — 94761 N-INVAS EAR/PLS OXIMETRY MLT: CPT

## 2024-12-15 PROCEDURE — 94640 AIRWAY INHALATION TREATMENT: CPT

## 2024-12-15 RX ORDER — SODIUM CHLORIDE 0.9 % (FLUSH) 0.9 %
5-40 SYRINGE (ML) INJECTION PRN
Status: DISCONTINUED | OUTPATIENT
Start: 2024-12-15 | End: 2024-12-16 | Stop reason: HOSPADM

## 2024-12-15 RX ORDER — GABAPENTIN 100 MG/1
100 CAPSULE ORAL 2 TIMES DAILY
Status: DISCONTINUED | OUTPATIENT
Start: 2024-12-15 | End: 2024-12-16 | Stop reason: HOSPADM

## 2024-12-15 RX ORDER — ACETAMINOPHEN 325 MG/1
650 TABLET ORAL EVERY 6 HOURS PRN
Status: DISCONTINUED | OUTPATIENT
Start: 2024-12-15 | End: 2024-12-16 | Stop reason: HOSPADM

## 2024-12-15 RX ORDER — DIPHENHYDRAMINE HYDROCHLORIDE 50 MG/ML
25 INJECTION INTRAMUSCULAR; INTRAVENOUS ONCE
Status: COMPLETED | OUTPATIENT
Start: 2024-12-15 | End: 2024-12-15

## 2024-12-15 RX ORDER — FUROSEMIDE 10 MG/ML
40 INJECTION INTRAMUSCULAR; INTRAVENOUS DAILY
Status: DISCONTINUED | OUTPATIENT
Start: 2024-12-15 | End: 2024-12-16

## 2024-12-15 RX ORDER — ATORVASTATIN CALCIUM 40 MG/1
40 TABLET, FILM COATED ORAL DAILY
Status: DISCONTINUED | OUTPATIENT
Start: 2024-12-15 | End: 2024-12-16 | Stop reason: HOSPADM

## 2024-12-15 RX ORDER — INSULIN LISPRO 100 [IU]/ML
20 INJECTION, SOLUTION INTRAVENOUS; SUBCUTANEOUS
Status: DISCONTINUED | OUTPATIENT
Start: 2024-12-15 | End: 2024-12-16 | Stop reason: HOSPADM

## 2024-12-15 RX ORDER — FUROSEMIDE 10 MG/ML
40 INJECTION INTRAMUSCULAR; INTRAVENOUS 2 TIMES DAILY
Status: DISCONTINUED | OUTPATIENT
Start: 2024-12-15 | End: 2024-12-15

## 2024-12-15 RX ORDER — MAGNESIUM SULFATE 1 G/100ML
1000 INJECTION INTRAVENOUS ONCE
Status: COMPLETED | OUTPATIENT
Start: 2024-12-15 | End: 2024-12-15

## 2024-12-15 RX ORDER — INSULIN LISPRO 100 [IU]/ML
32 INJECTION, SOLUTION INTRAVENOUS; SUBCUTANEOUS ONCE
Status: COMPLETED | OUTPATIENT
Start: 2024-12-15 | End: 2024-12-15

## 2024-12-15 RX ORDER — BUDESONIDE AND FORMOTEROL FUMARATE DIHYDRATE 160; 4.5 UG/1; UG/1
2 AEROSOL RESPIRATORY (INHALATION)
Status: DISCONTINUED | OUTPATIENT
Start: 2024-12-15 | End: 2024-12-16 | Stop reason: HOSPADM

## 2024-12-15 RX ORDER — IPRATROPIUM BROMIDE AND ALBUTEROL SULFATE 2.5; .5 MG/3ML; MG/3ML
1 SOLUTION RESPIRATORY (INHALATION) ONCE
Status: COMPLETED | OUTPATIENT
Start: 2024-12-15 | End: 2024-12-15

## 2024-12-15 RX ORDER — METHYLPREDNISOLONE SODIUM SUCCINATE 40 MG/ML
80 INJECTION INTRAMUSCULAR; INTRAVENOUS EVERY 12 HOURS
Status: DISCONTINUED | OUTPATIENT
Start: 2024-12-15 | End: 2024-12-15

## 2024-12-15 RX ORDER — CARVEDILOL 3.12 MG/1
6.25 TABLET ORAL 2 TIMES DAILY
Status: DISCONTINUED | OUTPATIENT
Start: 2024-12-15 | End: 2024-12-16 | Stop reason: HOSPADM

## 2024-12-15 RX ORDER — SODIUM CHLORIDE 9 MG/ML
INJECTION, SOLUTION INTRAVENOUS PRN
Status: DISCONTINUED | OUTPATIENT
Start: 2024-12-15 | End: 2024-12-16 | Stop reason: HOSPADM

## 2024-12-15 RX ORDER — INSULIN GLARGINE 100 [IU]/ML
80 INJECTION, SOLUTION SUBCUTANEOUS 2 TIMES DAILY
Status: DISCONTINUED | OUTPATIENT
Start: 2024-12-15 | End: 2024-12-16

## 2024-12-15 RX ORDER — TRAMADOL HYDROCHLORIDE 50 MG/1
50 TABLET ORAL EVERY 6 HOURS PRN
Status: DISCONTINUED | OUTPATIENT
Start: 2024-12-15 | End: 2024-12-16 | Stop reason: HOSPADM

## 2024-12-15 RX ORDER — POTASSIUM CHLORIDE 7.45 MG/ML
10 INJECTION INTRAVENOUS PRN
Status: DISCONTINUED | OUTPATIENT
Start: 2024-12-15 | End: 2024-12-16 | Stop reason: HOSPADM

## 2024-12-15 RX ORDER — PREDNISONE 20 MG/1
20 TABLET ORAL DAILY
Status: DISCONTINUED | OUTPATIENT
Start: 2024-12-17 | End: 2024-12-16

## 2024-12-15 RX ORDER — CLOPIDOGREL BISULFATE 75 MG/1
75 TABLET ORAL DAILY
Status: DISCONTINUED | OUTPATIENT
Start: 2024-12-15 | End: 2024-12-16 | Stop reason: HOSPADM

## 2024-12-15 RX ORDER — INSULIN LISPRO 100 [IU]/ML
20 INJECTION, SOLUTION INTRAVENOUS; SUBCUTANEOUS ONCE
Status: COMPLETED | OUTPATIENT
Start: 2024-12-15 | End: 2024-12-15

## 2024-12-15 RX ORDER — INSULIN LISPRO 100 [IU]/ML
0-4 INJECTION, SOLUTION INTRAVENOUS; SUBCUTANEOUS
Status: DISCONTINUED | OUTPATIENT
Start: 2024-12-15 | End: 2024-12-16 | Stop reason: HOSPADM

## 2024-12-15 RX ORDER — DEXTROSE MONOHYDRATE 100 MG/ML
INJECTION, SOLUTION INTRAVENOUS CONTINUOUS PRN
Status: DISCONTINUED | OUTPATIENT
Start: 2024-12-15 | End: 2024-12-16 | Stop reason: HOSPADM

## 2024-12-15 RX ORDER — BUPROPION HYDROCHLORIDE 100 MG/1
100 TABLET ORAL 2 TIMES DAILY
Status: DISCONTINUED | OUTPATIENT
Start: 2024-12-15 | End: 2024-12-16 | Stop reason: HOSPADM

## 2024-12-15 RX ORDER — MAGNESIUM SULFATE IN WATER 40 MG/ML
2000 INJECTION, SOLUTION INTRAVENOUS PRN
Status: DISCONTINUED | OUTPATIENT
Start: 2024-12-15 | End: 2024-12-16 | Stop reason: HOSPADM

## 2024-12-15 RX ORDER — DULAGLUTIDE 1.5 MG/.5ML
1.5 INJECTION, SOLUTION SUBCUTANEOUS WEEKLY
Status: DISCONTINUED | OUTPATIENT
Start: 2024-12-16 | End: 2024-12-15

## 2024-12-15 RX ORDER — HYDROCODONE BITARTRATE AND ACETAMINOPHEN 5; 325 MG/1; MG/1
1 TABLET ORAL ONCE
Status: COMPLETED | OUTPATIENT
Start: 2024-12-15 | End: 2024-12-15

## 2024-12-15 RX ORDER — ACETAMINOPHEN 650 MG/1
650 SUPPOSITORY RECTAL EVERY 6 HOURS PRN
Status: DISCONTINUED | OUTPATIENT
Start: 2024-12-15 | End: 2024-12-16 | Stop reason: HOSPADM

## 2024-12-15 RX ORDER — IPRATROPIUM BROMIDE AND ALBUTEROL SULFATE 2.5; .5 MG/3ML; MG/3ML
1 SOLUTION RESPIRATORY (INHALATION) EVERY 4 HOURS PRN
Status: DISCONTINUED | OUTPATIENT
Start: 2024-12-15 | End: 2024-12-16 | Stop reason: HOSPADM

## 2024-12-15 RX ORDER — ONDANSETRON 2 MG/ML
4 INJECTION INTRAMUSCULAR; INTRAVENOUS EVERY 6 HOURS PRN
Status: DISCONTINUED | OUTPATIENT
Start: 2024-12-15 | End: 2024-12-16 | Stop reason: HOSPADM

## 2024-12-15 RX ORDER — LISINOPRIL 20 MG/1
20 TABLET ORAL DAILY
Status: DISCONTINUED | OUTPATIENT
Start: 2024-12-15 | End: 2024-12-16 | Stop reason: HOSPADM

## 2024-12-15 RX ORDER — INSULIN GLARGINE 100 [IU]/ML
80 INJECTION, SOLUTION SUBCUTANEOUS DAILY
Status: DISCONTINUED | OUTPATIENT
Start: 2024-12-15 | End: 2024-12-15

## 2024-12-15 RX ORDER — INSULIN LISPRO 100 [IU]/ML
0-8 INJECTION, SOLUTION INTRAVENOUS; SUBCUTANEOUS
Status: DISCONTINUED | OUTPATIENT
Start: 2024-12-15 | End: 2024-12-15

## 2024-12-15 RX ORDER — MORPHINE SULFATE 2 MG/ML
2 INJECTION, SOLUTION INTRAMUSCULAR; INTRAVENOUS
Status: DISCONTINUED | OUTPATIENT
Start: 2024-12-15 | End: 2024-12-16 | Stop reason: HOSPADM

## 2024-12-15 RX ORDER — TAMSULOSIN HYDROCHLORIDE 0.4 MG/1
0.4 CAPSULE ORAL 2 TIMES DAILY
Status: DISCONTINUED | OUTPATIENT
Start: 2024-12-15 | End: 2024-12-16 | Stop reason: HOSPADM

## 2024-12-15 RX ORDER — CILOSTAZOL 100 MG/1
100 TABLET ORAL 2 TIMES DAILY
Status: DISCONTINUED | OUTPATIENT
Start: 2024-12-15 | End: 2024-12-16 | Stop reason: HOSPADM

## 2024-12-15 RX ORDER — GLUCAGON 1 MG/ML
1 KIT INJECTION PRN
Status: DISCONTINUED | OUTPATIENT
Start: 2024-12-15 | End: 2024-12-16 | Stop reason: HOSPADM

## 2024-12-15 RX ORDER — DULAGLUTIDE 1.5 MG/.5ML
1.5 INJECTION, SOLUTION SUBCUTANEOUS WEEKLY
Status: DISCONTINUED | OUTPATIENT
Start: 2024-12-15 | End: 2024-12-16 | Stop reason: HOSPADM

## 2024-12-15 RX ORDER — SODIUM CHLORIDE 0.9 % (FLUSH) 0.9 %
5-40 SYRINGE (ML) INJECTION EVERY 12 HOURS SCHEDULED
Status: DISCONTINUED | OUTPATIENT
Start: 2024-12-15 | End: 2024-12-16 | Stop reason: HOSPADM

## 2024-12-15 RX ORDER — ONDANSETRON 4 MG/1
4 TABLET, ORALLY DISINTEGRATING ORAL EVERY 8 HOURS PRN
Status: DISCONTINUED | OUTPATIENT
Start: 2024-12-15 | End: 2024-12-16 | Stop reason: HOSPADM

## 2024-12-15 RX ORDER — POTASSIUM CHLORIDE 1500 MG/1
40 TABLET, EXTENDED RELEASE ORAL PRN
Status: DISCONTINUED | OUTPATIENT
Start: 2024-12-15 | End: 2024-12-16 | Stop reason: HOSPADM

## 2024-12-15 RX ORDER — POLYETHYLENE GLYCOL 3350 17 G/17G
17 POWDER, FOR SOLUTION ORAL DAILY PRN
Status: DISCONTINUED | OUTPATIENT
Start: 2024-12-15 | End: 2024-12-16 | Stop reason: HOSPADM

## 2024-12-15 RX ORDER — MORPHINE SULFATE 2 MG/ML
1 INJECTION, SOLUTION INTRAMUSCULAR; INTRAVENOUS
Status: DISCONTINUED | OUTPATIENT
Start: 2024-12-15 | End: 2024-12-16 | Stop reason: HOSPADM

## 2024-12-15 RX ORDER — METHYLPREDNISOLONE SODIUM SUCCINATE 40 MG/ML
40 INJECTION INTRAMUSCULAR; INTRAVENOUS DAILY
Status: DISCONTINUED | OUTPATIENT
Start: 2024-12-16 | End: 2024-12-16

## 2024-12-15 RX ORDER — FUROSEMIDE 10 MG/ML
40 INJECTION INTRAMUSCULAR; INTRAVENOUS ONCE
Status: COMPLETED | OUTPATIENT
Start: 2024-12-15 | End: 2024-12-15

## 2024-12-15 RX ORDER — FINASTERIDE 5 MG/1
5 TABLET, FILM COATED ORAL DAILY
Status: DISCONTINUED | OUTPATIENT
Start: 2024-12-15 | End: 2024-12-16 | Stop reason: HOSPADM

## 2024-12-15 RX ADMIN — CILOSTAZOL 100 MG: 100 TABLET ORAL at 21:09

## 2024-12-15 RX ADMIN — LISINOPRIL 20 MG: 20 TABLET ORAL at 09:12

## 2024-12-15 RX ADMIN — APIXABAN 2.5 MG: 2.5 TABLET, FILM COATED ORAL at 21:09

## 2024-12-15 RX ADMIN — TAMSULOSIN HYDROCHLORIDE 0.4 MG: 0.4 CAPSULE ORAL at 21:10

## 2024-12-15 RX ADMIN — FINASTERIDE 5 MG: 5 TABLET, FILM COATED ORAL at 09:12

## 2024-12-15 RX ADMIN — INSULIN LISPRO 20 UNITS: 100 INJECTION, SOLUTION INTRAVENOUS; SUBCUTANEOUS at 22:28

## 2024-12-15 RX ADMIN — INSULIN LISPRO 4 UNITS: 100 INJECTION, SOLUTION INTRAVENOUS; SUBCUTANEOUS at 12:30

## 2024-12-15 RX ADMIN — CARVEDILOL 6.25 MG: 3.12 TABLET, FILM COATED ORAL at 09:12

## 2024-12-15 RX ADMIN — TAMSULOSIN HYDROCHLORIDE 0.4 MG: 0.4 CAPSULE ORAL at 09:12

## 2024-12-15 RX ADMIN — RIFAXIMIN 600 MG: 200 TABLET ORAL at 09:13

## 2024-12-15 RX ADMIN — INSULIN LISPRO 4 UNITS: 100 INJECTION, SOLUTION INTRAVENOUS; SUBCUTANEOUS at 21:19

## 2024-12-15 RX ADMIN — CARVEDILOL 6.25 MG: 3.12 TABLET, FILM COATED ORAL at 21:09

## 2024-12-15 RX ADMIN — FUROSEMIDE 40 MG: 10 INJECTION, SOLUTION INTRAMUSCULAR; INTRAVENOUS at 02:15

## 2024-12-15 RX ADMIN — INSULIN LISPRO 4 UNITS: 100 INJECTION, SOLUTION INTRAVENOUS; SUBCUTANEOUS at 02:43

## 2024-12-15 RX ADMIN — DULAGLUTIDE 1.5 MG: 1.5 INJECTION, SOLUTION SUBCUTANEOUS at 22:51

## 2024-12-15 RX ADMIN — ONDANSETRON 4 MG: 4 TABLET, ORALLY DISINTEGRATING ORAL at 02:40

## 2024-12-15 RX ADMIN — INSULIN LISPRO 32 UNITS: 100 INJECTION, SOLUTION INTRAVENOUS; SUBCUTANEOUS at 16:45

## 2024-12-15 RX ADMIN — METHYLPREDNISOLONE SODIUM SUCCINATE 80 MG: 40 INJECTION INTRAMUSCULAR; INTRAVENOUS at 09:11

## 2024-12-15 RX ADMIN — BUDESONIDE AND FORMOTEROL FUMARATE DIHYDRATE 2 PUFF: 160; 4.5 AEROSOL RESPIRATORY (INHALATION) at 20:11

## 2024-12-15 RX ADMIN — DIPHENHYDRAMINE HYDROCHLORIDE 25 MG: 50 INJECTION INTRAMUSCULAR; INTRAVENOUS at 03:24

## 2024-12-15 RX ADMIN — CLOPIDOGREL BISULFATE 75 MG: 75 TABLET ORAL at 09:12

## 2024-12-15 RX ADMIN — MAGNESIUM SULFATE HEPTAHYDRATE 1000 MG: 1 INJECTION, SOLUTION INTRAVENOUS at 03:03

## 2024-12-15 RX ADMIN — INSULIN LISPRO 20 UNITS: 100 INJECTION, SOLUTION INTRAVENOUS; SUBCUTANEOUS at 21:18

## 2024-12-15 RX ADMIN — MORPHINE SULFATE 2 MG: 2 INJECTION, SOLUTION INTRAMUSCULAR; INTRAVENOUS at 02:40

## 2024-12-15 RX ADMIN — APIXABAN 2.5 MG: 2.5 TABLET, FILM COATED ORAL at 09:12

## 2024-12-15 RX ADMIN — MORPHINE SULFATE 2 MG: 2 INJECTION, SOLUTION INTRAMUSCULAR; INTRAVENOUS at 19:32

## 2024-12-15 RX ADMIN — SODIUM CHLORIDE, PRESERVATIVE FREE 10 ML: 5 INJECTION INTRAVENOUS at 21:10

## 2024-12-15 RX ADMIN — INSULIN LISPRO 20 UNITS: 100 INJECTION, SOLUTION INTRAVENOUS; SUBCUTANEOUS at 12:29

## 2024-12-15 RX ADMIN — HYDROCODONE BITARTRATE AND ACETAMINOPHEN 1 TABLET: 5; 325 TABLET ORAL at 00:24

## 2024-12-15 RX ADMIN — CILOSTAZOL 100 MG: 100 TABLET ORAL at 09:12

## 2024-12-15 RX ADMIN — INSULIN GLARGINE 80 UNITS: 100 INJECTION, SOLUTION SUBCUTANEOUS at 09:11

## 2024-12-15 RX ADMIN — ATORVASTATIN CALCIUM 40 MG: 40 TABLET, FILM COATED ORAL at 09:12

## 2024-12-15 RX ADMIN — SODIUM CHLORIDE, PRESERVATIVE FREE 10 ML: 5 INJECTION INTRAVENOUS at 09:13

## 2024-12-15 RX ADMIN — FUROSEMIDE 40 MG: 10 INJECTION, SOLUTION INTRAMUSCULAR; INTRAVENOUS at 12:30

## 2024-12-15 RX ADMIN — INSULIN GLARGINE 80 UNITS: 100 INJECTION, SOLUTION SUBCUTANEOUS at 21:19

## 2024-12-15 RX ADMIN — GABAPENTIN 100 MG: 100 CAPSULE ORAL at 02:15

## 2024-12-15 RX ADMIN — IPRATROPIUM BROMIDE AND ALBUTEROL SULFATE 1 DOSE: 2.5; .5 SOLUTION RESPIRATORY (INHALATION) at 00:19

## 2024-12-15 RX ADMIN — INSULIN LISPRO 6 UNITS: 100 INJECTION, SOLUTION INTRAVENOUS; SUBCUTANEOUS at 09:11

## 2024-12-15 RX ADMIN — GABAPENTIN 100 MG: 100 CAPSULE ORAL at 09:13

## 2024-12-15 RX ADMIN — RIFAXIMIN 600 MG: 200 TABLET ORAL at 14:33

## 2024-12-15 RX ADMIN — GABAPENTIN 100 MG: 100 CAPSULE ORAL at 21:10

## 2024-12-15 ASSESSMENT — PAIN SCALES - GENERAL
PAINLEVEL_OUTOF10: 4
PAINLEVEL_OUTOF10: 8
PAINLEVEL_OUTOF10: 7
PAINLEVEL_OUTOF10: 4

## 2024-12-15 ASSESSMENT — PAIN - FUNCTIONAL ASSESSMENT: PAIN_FUNCTIONAL_ASSESSMENT: ACTIVITIES ARE NOT PREVENTED

## 2024-12-15 ASSESSMENT — PAIN DESCRIPTION - DESCRIPTORS
DESCRIPTORS: ACHING
DESCRIPTORS: DULL
DESCRIPTORS: ACHING

## 2024-12-15 ASSESSMENT — PAIN DESCRIPTION - ORIENTATION
ORIENTATION: RIGHT;POSTERIOR
ORIENTATION: RIGHT
ORIENTATION: RIGHT

## 2024-12-15 ASSESSMENT — PAIN DESCRIPTION - LOCATION
LOCATION: LEG
LOCATION: FOOT

## 2024-12-15 NOTE — ED NOTES
limits   D-DIMER, QUANTITATIVE - Abnormal; Notable for the following components:    D-Dimer, Quant 0.85 (*)     All other components within normal limits   URINALYSIS WITH MICROSCOPIC - Abnormal; Notable for the following components:    Glucose, Ur 1+ (*)     Specific Gravity, UA 1.030 (*)     Protein, UA TRACE (*)     Leukocyte Esterase, Urine TRACE (*)     All other components within normal limits   HEPATIC FUNCTION PANEL - Abnormal; Notable for the following components:    Alkaline Phosphatase 130 (*)     All other components within normal limits   TROPONIN - Abnormal; Notable for the following components:    Troponin, High Sensitivity 26 (*)     All other components within normal limits   CULTURE, BLOOD 1   CULTURE, BLOOD 1   COVID-19, RAPID   RAPID INFLUENZA A/B ANTIGENS   CULTURE, URINE   MAGNESIUM   LIPASE   BRAIN NATRIURETIC PEPTIDE     Imaging:    CT CHEST PULMONARY EMBOLISM W CONTRAST   Final Result   1. No pulmonary embolism through the level of the lobar to segmental branches.   2. Apical predominant emphysema with bronchial thickening.  No new infiltrate.   3. Coronary artery atherosclerotic disease.         XR CHEST PORTABLE   Final Result   Pulmonary venous congestion with perihilar pulmonary consolidation. Findings   may be related to pulmonary edema.           Medications Administered         apixaban (ELIQUIS) tablet 2.5 mg Admin Date  12/14/2024 Action  Given Dose  2.5 mg Route  Oral Documented By  Susanne Brewster RN        aspirin chewable tablet 324 mg Admin Date  12/14/2024 Action  Given Dose  324 mg Route  Oral Documented By  Genet Amato RN        carvedilol (COREG) tablet 6.25 mg Admin Date  12/14/2024 Action  Given Dose  6.25 mg Route  Oral Documented By  Susanne Brewster RN        HYDROcodone-acetaminophen (NORCO) 5-325 MG per tablet 1 tablet Admin Date  12/15/2024 Action  Given Dose  1 tablet Route  Oral Documented By  Susanne Brewster RN        iopamidol (ISOVUE-370) 76 % injection  100 mL Admin Date  12/14/2024 Action  Given Dose  100 mL Route  IntraVENous Documented By  Carmita Bartholomew        ipratropium 0.5 mg-albuterol 2.5 mg (DUONEB) 0.5-2.5 (3) MG/3ML nebulizer solution Admin Date  12/14/2024 Action  Given Dose   Route   Documented By  Sonia Franz RCP        ipratropium 0.5 mg-albuterol 2.5 mg (DUONEB) nebulizer solution 1 Dose Admin Date  12/14/2024 Action  Given Dose  1 Dose Route  Inhalation Documented By  Sonia Franz RCP        ipratropium 0.5 mg-albuterol 2.5 mg (DUONEB) nebulizer solution 1 Dose Admin Date  12/15/2024 Action  Given Dose  1 Dose Route  Inhalation Documented By  Sonia Franz RCP        methylPREDNISolone sodium succ (SOLU-MEDROL) injection 125 mg Admin Date  12/14/2024 Action  Given Dose  125 mg Route  IntraVENous Documented By  Genet Amato RN          Medication Reconciliation Completed: Yes      Consults:    [x] Hospitalist  Completed     [x] yes     [] no    [] Cardiology  Completed     [] yes     [] no    [] General Surgery  Completed     [] yes     [] no                 [] Orthopedics  Completed     [] yes     [] no    [] OB/GYN   Completed     [] yes     [] no    [] Oncology   Completed     [] yes     [] no    [] Neurology   Completed     [] yes     [] no    [] Podiatry   Completed     [] yes     [] no    [] Urology   Completed     [] yes     [] no         Electronically signed by Susanne Brewster RN on 12/15/2024 at 1:08 AM

## 2024-12-15 NOTE — PROGRESS NOTES
[facility] Shortness of breath [R06.02]  Wheezing [R06.2]  Hypoxia [R09.02]  Elevated troponin [R79.89]  COPD exacerbation (HCC) [J44.1]  Pulmonary vascular congestion [R09.89]  Acute heart failure, unspecified heart failure type (HCC) [I50.9] .   Vitals:    12/15/24 0912   BP:    Pulse: (!) 102   Resp:    Temp:    SpO2:    . Patients home meds are   Prior to Admission medications    Medication Sig Start Date End Date Taking? Authorizing Provider   SINCERE NAJERA 100 UNIT/ML SOPN  11/8/24  Yes Provider, Historical, MD   rifAXIMin (XIFAXAN) 550 MG tablet Take 1 tablet by mouth 3 times daily 11/26/24 12/17/24 Yes Provider, MD Nikunj   traMADol (ULTRAM) 50 MG tablet Take 1 tablet by mouth every 6 hours as needed. 3/22/24  Yes Provider, MD Nikunj   cilostazol (PLETAL) 100 MG tablet Take 1 tablet by mouth 2 times daily 12/7/24  Yes Provider, Historical, MD   carvedilol (COREG) 6.25 MG tablet Take 1 tablet by mouth 10/26/23 12/14/24 Yes Provider, Historical, MD   furosemide (LASIX) 20 MG tablet Take 1 tablet by mouth daily 6/29/23 12/14/24 Yes Provider, Historical, MD   clopidogrel (PLAVIX) 75 MG tablet Take 1 tablet by mouth daily 7/20/23  Yes Provider, HistoricalMD HARLEY SOLOSTAR 300 UNIT/ML SOPN INJECT 100 UNITS INTO THE SKIN DAILY 8/20/22  Yes Provider, Historical, MD   apixaban (ELIQUIS) 5 MG TABS tablet Take 0.5 tablets by mouth 2 times daily 12/3/20  Yes Mannie Meng MD   Dulaglutide (TRULICITY) 1.5 MG/0.5ML SOPN Inject 0.5 mLs into the skin once a week   Yes Provider, MD Nikunj   buPROPion (WELLBUTRIN) 100 MG tablet Take by mouth 2 times daily   Yes Provider, MD Nikunj   finasteride (PROSCAR) 5 MG tablet TAKE 1 TABLET BY MOUTH EVERY DAY 9/24/19  Yes Provider, Historical, MD   fluticasone-salmeterol (ADVAIR) 250-50 MCG/DOSE AEPB  11/29/18  Yes Provider, MD Nikunj   atorvastatin (LIPITOR) 40 MG tablet TAKE 1 TABLET BY MOUTH EVERY DAY

## 2024-12-15 NOTE — FLOWSHEET NOTE
Patient is getting IV Magnesium and states that his left arm, neck and forehead feels \"Itchy.\"NP notified. Mag paused and Benadryl given.

## 2024-12-15 NOTE — PROGRESS NOTES
Pt blood sugar 474. Dr Shaikh notified. New order for one time dose of humalog 32 units once right before dinner and recheck 2000.

## 2024-12-15 NOTE — H&P
HOSPITALIST ADMISSION H&P      REASON FOR ADMISSION:  Acute heart failure, COPD exacerbation  ESTIMATED LENGTH OF STAY:>2 midnights, 3-4 days    ATTENDING/ADMITTING PHYSICIAN: Terrell Shaikh MD  PCP: Jacek Taylor MD    HISTORY OF PRESENT ILLNESS:      The patient is a 74 y.o. male patient of Jacek Taylor MD who presents from ER with c/o Shortness of breath. Patient reports he was at this birthday party and started shaking and became short of breath, \"it felt like some years ago when I had my PE\". Patient reports \"I couldn't get my breath\" had chest tightness-no pain, did not radiate anywhere, by the time he arrived in ER patient stated he was \"gasping to breathe\". Does report feeling of nausea and diaphoresis-skin moist upon arrival to floor and patching for telemetry would not stick. Patient denies vomiting, or chest pain, bladder problems, does report having some constipation since starting the xifaxin for his chronic diarrhea.    Lactic acid 2.5 on arrival to ER, recheck down to 1.0 without treatment.    DMII: HgbA1C 8.5 on 12/26/23.    CKD: Currently stage 2.    Hx DVT/PE, has Factor V Leiden mutation: Eliquis, Plavix, Pletal.    ECHO: 3/22/23:    Normal left ventricular systolic function. 2D Curiel's biplane EF 56%   with normal wall motion. Grade II (moderate) left ventricular diastolic   dysfunction.  Mild concentric left ventricular hypertrophy.     Right ventricle cavity appears normal. Systolic function is normal.     Mildly dilated left atrium.     There is aortic valve sclerosis. There is trace aortic regurgitation.     There is mild mitral annular calcification. There is trace mitral   regurgitation.    There is trace tricuspid regurgitation. Unable to estimate RVSP due to   lack of measurable TR jet.     Prior echo 11/15/2019. EF 54%. Trace AI. Trace MR. Trace TR.     In ER:   CXR:  IMPRESSION:  Pulmonary venous congestion with perihilar pulmonary consolidation. Findings  may be related to

## 2024-12-15 NOTE — PROGRESS NOTES
Hospitalist Progress Note  12/15/2024 8:47 AM  Subjective:   Admit Date: 12/14/2024  PCP: Jacek Taylor MD  Interval History: much better 90%    Diet: ADULT DIET; Regular; 5 carb choices (75 gm/meal); No Added Salt (3-4 gm); 1800 ml  Medications:   Scheduled Meds:   apixaban  2.5 mg Oral BID    atorvastatin  40 mg Oral Daily    buPROPion  100 mg Oral BID    carvedilol  6.25 mg Oral BID    cilostazol  100 mg Oral BID    clopidogrel  75 mg Oral Daily    empagliflozin  10 mg Oral Daily    finasteride  5 mg Oral Daily    gabapentin  100 mg Oral BID    lisinopril  20 mg Oral Daily    rifAXIMin  600 mg Oral TID    tamsulosin  0.4 mg Oral BID    sodium chloride flush  5-40 mL IntraVENous 2 times per day    insulin lispro  0-8 Units SubCUTAneous 4x Daily AC & HS    furosemide  40 mg IntraVENous BID    methylPREDNISolone sodium succ  80 mg IntraVENous Q12H    [START ON 12/17/2024] predniSONE  20 mg Oral Daily    insulin glargine  80 Units SubCUTAneous Daily    NONFORMULARY 1 Application (Patient Supplied)  1 Application Topical Daily     Continuous Infusions:   dextrose      sodium chloride       CBC:   Recent Labs     12/14/24  2023 12/15/24  0436   WBC 9.6 15.5*   HGB 14.3 12.8*   PLT See Reflexed IPF Result 111*     BMP:    Recent Labs     12/14/24  2023 12/15/24  0436    133*   K 4.1 4.5    98   CO2 26 22   BUN 16 21   CREATININE 1.1 1.2   GLUCOSE 222* 337*     Hepatic:   Recent Labs     12/14/24  2023 12/15/24  0436   AST 19 14   ALT 23 19   BILITOT 1.0 0.8   ALKPHOS 130* 105     Troponin: No results for input(s): \"TROPONINI\" in the last 72 hours.  BNP: No results for input(s): \"BNP\" in the last 72 hours.  Lipids: No results for input(s): \"CHOL\", \"HDL\" in the last 72 hours.    Invalid input(s): \"LDLCALCU\"  INR: No results for input(s): \"INR\" in the last 72 hours.    Objective:   Vitals: BP (!) 150/84   Pulse 93   Temp 97.8 °F (36.6 °C) (Temporal)   Resp 18   Ht 1.88 m (6' 2\")   Wt (!) 136.6 kg (301 lb

## 2024-12-15 NOTE — ED PROVIDER NOTES
[facility] EMERGENCY DEPARTMENT  Emergency Department Encounter  [facility]       Pt Name:Ranjeet Wagoner  MRN: [id number]  Birthdate 1950  Date of evaluation: 12/14/24  PCP:  Jacek Taylor MD      CHIEF COMPLAINT       Chief Complaint   Patient presents with    Shortness of Breath     For about 1 hour. \"Similar to when I had the last PE.\"       HISTORY OF PRESENT ILLNESS     Ranjeet Wagoner is a 74 y.o. male who presents via personal vehicle with reports of acute onset of shortness of breath.  Acute onset of shortness of breath prior to arrival.  Patient was at [facility] with his family when symptoms started.  He denies cough or congestion prior to onset of shortness of breath.  Does report COPD history.  Does not smoke.  Does report cardiac history as well as PE history.  Patient is on Eliquis, Plavix and has an IVC filter due to previous DVT and PE.  He has had no fevers.  No chills.  He denies chest pain.  He was recently started on an antibiotic for SIBO which is Xifaxan.  He has had some diarrhea.  No constipation.  No nausea or vomiting.  Intermittent abdominal discomfort which is not described as pain.  He has been eating and drinking appropriately.  This evening had acute onset of shortness of breath.  He presents short of breath, tachypneic, no medications prior to arrival.    PAST MEDICAL / SURGICAL / SOCIAL / FAMILY HISTORY      has a past medical history of Acquired posterior equinus, left, Acute cystitis without hematuria, Acute osteomyelitis of toe, right, Acute prostatitis, Amputation at midfoot ([facility]), Asthma, Balanitis, BPH (benign prostatic hyperplasia), Cellulitis of right foot, Cervical spinal stenosis, Chest pain, Chronic ulcer of great toe ([facility]), Complete traumatic amputation of left midfoot, initial encounter ([facility]), Diabetic ulcer of left midfoot associated with type 2 diabetes mellitus, with fat layer exposed ([facility]), Disorder of kidney and ureter,  fluid in the fissure.  This is consistent with patient's chest x-ray which was showing vascular congestion.  Patient's last echo March 2023 showing Normal left ventricular systolic dysfunction with an EF of 56%.  Patient does have bilateral 1+ pitting edema in conjunction with vascular congestion on imaging and concern for fluid status being contributory to patient's acute presentation.  Patient did require second DuoNeb here in the emergency department.  [place] was provided for pain control.  Patient's lactic minimally elevated.  No leukocytosis.  No acute anemia.  Thrombocytopenia is noted.  No acute electrolyte derangement.  No acute renal dysfunction.  Blood cultures have been obtained.  I do not feel strongly about starting antibiotics as I do suspect that this is more of a COPD exacerbation versus acute volume overloaded versus combination of the 2.  I have discussed this with the hospitalist service.  They are in agreements that patient will be monitor off of antibiotics.  Urinalysis has been obtained.  There is trace leukocyte Estrace.  Will hold off on antibiotics at this time as urine culture is pending, will be treated accordingly on admission if necessary.    At this time I discussed admission with patient and family and they are comfortable compliant with this plan.  I do believe patient warrants inpatient admission for continued medical monitoring, continued supplemental oxygen support.  Patient is comfortable compliant with this.  Family is comfortable compliant with this.  I have discussed with the day, hospitalist service who is excepting patient at this time.  Patient boarding the emergency department awaiting bed placement and admission orders.  Patient admitted in hemodynamically stable condition.             EKG    EKG interpreted by myself in the absence of cardiologist at 2040 sinus tachycardia with multiple PVCs and fusion complexes noted.  There is axis deviation.  There is ventricular  hypertrophy.  Ventricular rate 148, AR interval 150, QRS 82, , QTc 587, no significant acute ST elevation, no significant or acute ST depression.  There is significant artifact and significant ST abnormalities noted although when compared to previous EKG obtained on 1/1/2021    All EKG's are interpreted by the Emergency Department Physician who either signs or Co-signs this chart in the absence of a cardiologist.      CONSULTS:  None    CRITICAL CARE:  There was significant risk of life threatening deterioration of patient's condition requiring my direct management. Critical care time 45 minutes, excluding any documented procedures.          FINAL IMPRESSION      1. Hypoxia    2. Shortness of breath    3. COPD exacerbation (HCC)    4. Wheezing    5. Elevated troponin    6. Pulmonary vascular congestion          DISPOSITION / PLAN     DISPOSITION Decision To Admit 12/15/2024 12:00:32 AM   DISPOSITION CONDITION Stable           PATIENT REFERRED TO:  No follow-up provider specified.    DISCHARGE MEDICATIONS:  New Prescriptions    No medications on file       Daria Han DO  Emergency Medicine Physician    (Please note that portions of this note were completed with a voice recognition program.  Efforts were made to edit the dictations but occasionally words are mis-transcribed.)                      Daria Han DO  12/15/24 0050

## 2024-12-16 ENCOUNTER — APPOINTMENT (OUTPATIENT)
Age: 74
DRG: 190 | End: 2024-12-16
Payer: COMMERCIAL

## 2024-12-16 ENCOUNTER — APPOINTMENT (OUTPATIENT)
Dept: GENERAL RADIOLOGY | Age: 74
DRG: 190 | End: 2024-12-16
Payer: COMMERCIAL

## 2024-12-16 ENCOUNTER — APPOINTMENT (OUTPATIENT)
Dept: INTERVENTIONAL RADIOLOGY/VASCULAR | Age: 74
DRG: 190 | End: 2024-12-16
Attending: INTERNAL MEDICINE
Payer: COMMERCIAL

## 2024-12-16 VITALS
RESPIRATION RATE: 18 BRPM | DIASTOLIC BLOOD PRESSURE: 69 MMHG | WEIGHT: 302.1 LBS | HEART RATE: 98 BPM | TEMPERATURE: 98.2 F | SYSTOLIC BLOOD PRESSURE: 115 MMHG | HEIGHT: 74 IN | BODY MASS INDEX: 38.77 KG/M2 | OXYGEN SATURATION: 95 %

## 2024-12-16 LAB
ANION GAP SERPL CALCULATED.3IONS-SCNC: 12 MMOL/L (ref 9–17)
BASOPHILS # BLD: <0.03 K/UL (ref 0–0.2)
BASOPHILS NFR BLD: 0 % (ref 0–2)
BUN SERPL-MCNC: 42 MG/DL (ref 8–23)
BUN/CREAT SERPL: 26 (ref 9–20)
CALCIUM SERPL-MCNC: 8 MG/DL (ref 8.6–10.4)
CHLORIDE SERPL-SCNC: 98 MMOL/L (ref 98–107)
CO2 SERPL-SCNC: 24 MMOL/L (ref 20–31)
CREAT SERPL-MCNC: 1.6 MG/DL (ref 0.7–1.2)
ECHO AO ASC DIAM: 3.5 CM
ECHO AO ASCENDING AORTA INDEX: 1.35 CM/M2
ECHO AO ROOT DIAM: 4.1 CM
ECHO AO ROOT INDEX: 1.58 CM/M2
ECHO AV AREA PEAK VELOCITY: 1.5 CM2
ECHO AV AREA/BSA PEAK VELOCITY: 0.6 CM2/M2
ECHO AV PEAK GRADIENT: 11 MMHG
ECHO AV PEAK VELOCITY: 1.6 M/S
ECHO AV VELOCITY RATIO: 0.5
ECHO BSA: 2.67 M2
ECHO BSA: 2.67 M2
ECHO EST RA PRESSURE: 8 MMHG
ECHO LA AREA 4C: 12.5 CM2
ECHO LA DIAMETER INDEX: 1.35 CM/M2
ECHO LA DIAMETER: 3.5 CM
ECHO LA MAJOR AXIS: 4.4 CM
ECHO LA TO AORTIC ROOT RATIO: 0.85
ECHO LA VOL MOD A4C: 28 ML (ref 18–58)
ECHO LA VOLUME INDEX MOD A4C: 11 ML/M2 (ref 16–34)
ECHO LV E' LATERAL VELOCITY: 10 CM/S
ECHO LV E' SEPTAL VELOCITY: 6.31 CM/S
ECHO LV EDV A4C: 133 ML
ECHO LV EDV INDEX A4C: 51 ML/M2
ECHO LV EF PHYSICIAN: 63 %
ECHO LV EJECTION FRACTION A4C: 63 %
ECHO LV ESV A4C: 49 ML
ECHO LV ESV INDEX A4C: 19 ML/M2
ECHO LV FRACTIONAL SHORTENING: 30 % (ref 28–44)
ECHO LV INTERNAL DIMENSION DIASTOLE INDEX: 2.05 CM/M2
ECHO LV INTERNAL DIMENSION DIASTOLIC: 5.3 CM (ref 4.2–5.9)
ECHO LV INTERNAL DIMENSION SYSTOLIC INDEX: 1.43 CM/M2
ECHO LV INTERNAL DIMENSION SYSTOLIC: 3.7 CM
ECHO LV IVSD: 1.3 CM (ref 0.6–1)
ECHO LV MASS 2D: 271.6 G (ref 88–224)
ECHO LV MASS INDEX 2D: 104.9 G/M2 (ref 49–115)
ECHO LV POSTERIOR WALL DIASTOLIC: 1.2 CM (ref 0.6–1)
ECHO LV RELATIVE WALL THICKNESS RATIO: 0.45
ECHO LVOT AREA: 3.1 CM2
ECHO LVOT DIAM: 2 CM
ECHO LVOT PEAK GRADIENT: 2 MMHG
ECHO LVOT PEAK VELOCITY: 0.8 M/S
ECHO MV A VELOCITY: 0.65 M/S
ECHO MV E DECELERATION TIME (DT): 292 MS
ECHO MV E VELOCITY: 0.76 M/S
ECHO MV E/A RATIO: 1.17
ECHO MV E/E' LATERAL: 7.6
ECHO MV E/E' RATIO (AVERAGED): 9.82
ECHO MV E/E' SEPTAL: 12.04
ECHO MV MAX VELOCITY: 1.3 M/S
ECHO MV PEAK GRADIENT: 7 MMHG
ECHO PV MAX VELOCITY: 1 M/S
ECHO PV PEAK GRADIENT: 4 MMHG
ECHO RIGHT VENTRICULAR SYSTOLIC PRESSURE (RVSP): 35 MMHG
ECHO TV PEAK GRADIENT: 3 MMHG
ECHO TV REGURGITANT MAX VELOCITY: 2.59 M/S
ECHO TV REGURGITANT PEAK GRADIENT: 27 MMHG
EOSINOPHIL # BLD: <0.03 K/UL (ref 0–0.44)
EOSINOPHILS RELATIVE PERCENT: 0 % (ref 1–4)
ERYTHROCYTE [DISTWIDTH] IN BLOOD BY AUTOMATED COUNT: 16.2 % (ref 11.8–14.4)
GFR, ESTIMATED: 45 ML/MIN/1.73M2
GLUCOSE BLD-MCNC: 145 MG/DL (ref 75–110)
GLUCOSE BLD-MCNC: 170 MG/DL (ref 75–110)
GLUCOSE BLD-MCNC: 215 MG/DL (ref 75–110)
GLUCOSE BLD-MCNC: 247 MG/DL (ref 75–110)
GLUCOSE BLD-MCNC: 274 MG/DL (ref 75–110)
GLUCOSE BLD-MCNC: 304 MG/DL (ref 75–110)
GLUCOSE BLD-MCNC: 309 MG/DL (ref 75–110)
GLUCOSE BLD-MCNC: 310 MG/DL (ref 75–110)
GLUCOSE BLD-MCNC: 404 MG/DL (ref 75–110)
GLUCOSE SERPL-MCNC: 279 MG/DL (ref 70–99)
HCT VFR BLD AUTO: 33 % (ref 40.7–50.3)
HGB BLD-MCNC: 11.4 G/DL (ref 13–17)
IMM GRANULOCYTES # BLD AUTO: 0.1 K/UL (ref 0–0.3)
IMM GRANULOCYTES NFR BLD: 1 %
LYMPHOCYTES NFR BLD: 1.22 K/UL (ref 1.1–3.7)
LYMPHOCYTES RELATIVE PERCENT: 9 % (ref 24–43)
MCH RBC QN AUTO: 31.3 PG (ref 25.2–33.5)
MCHC RBC AUTO-ENTMCNC: 34.5 G/DL (ref 25.2–33.5)
MCV RBC AUTO: 90.7 FL (ref 82.6–102.9)
MICROORGANISM SPEC CULT: ABNORMAL
MONOCYTES NFR BLD: 1.57 K/UL (ref 0.1–1.2)
MONOCYTES NFR BLD: 12 % (ref 3–12)
NEUTROPHILS NFR BLD: 78 % (ref 36–65)
NEUTS SEG NFR BLD: 10.42 K/UL (ref 1.5–8.1)
NRBC BLD-RTO: 0 PER 100 WBC
PLATELET # BLD AUTO: 122 K/UL (ref 138–453)
PMV BLD AUTO: 12.2 FL (ref 8.1–13.5)
POTASSIUM SERPL-SCNC: 4.4 MMOL/L (ref 3.7–5.3)
RBC # BLD AUTO: 3.64 M/UL (ref 4.21–5.77)
SERVICE CMNT-IMP: ABNORMAL
SODIUM SERPL-SCNC: 134 MMOL/L (ref 135–144)
SPECIMEN DESCRIPTION: ABNORMAL
VAS LEFT ATA PROX PSV: 37.5 CM/S
VAS LEFT CFA PROX PSV: 133.7 CM/S
VAS LEFT PERONEAL PROX PSV: 41.1 CM/S
VAS LEFT PFA PROX PSV: 310.6 CM/S
VAS LEFT POP A DIST PSV: 78.1 CM/S
VAS LEFT POP A PROX PSV: 62.3 CM/S
VAS LEFT POP A PROX VEL RATIO: 2.49
VAS LEFT PTA PROX PSV: 29 CM/S
VAS LEFT SFA DIST PSV: 25 CM/S
VAS LEFT SFA DIST VEL RATIO: 0.14
VAS LEFT SFA MID PSV: 180.1 CM/S
VAS LEFT SFA MID VEL RATIO: 1.23
VAS LEFT SFA PROX PSV: 145.9 CM/S
VAS LEFT SFA PROX VEL RATIO: 1.09
VAS RIGHT ATA PROX PSV: 62.5 CM/S
VAS RIGHT CFA PROX PSV: 163.6 CM/S
VAS RIGHT PERONEAL PROX PSV: 10.8 CM/S
VAS RIGHT PFA PROX PSV: 130.8 CM/S
VAS RIGHT POP A DIST PSV: 90.3 CM/S
VAS RIGHT POP A PROX PSV: 69 CM/S
VAS RIGHT POP A PROX VEL RATIO: 1.11
VAS RIGHT PTA PROX PSV: 44.9 CM/S
VAS RIGHT SFA DIST PSV: 61.9 CM/S
VAS RIGHT SFA DIST VEL RATIO: 0.63
VAS RIGHT SFA MID PSV: 98.4 CM/S
VAS RIGHT SFA MID VEL RATIO: 1.1
VAS RIGHT SFA PROX PSV: 91.2 CM/S
VAS RIGHT SFA PROX VEL RATIO: 0.6
WBC OTHER # BLD: 13.3 K/UL (ref 3.5–11.3)

## 2024-12-16 PROCEDURE — 94761 N-INVAS EAR/PLS OXIMETRY MLT: CPT

## 2024-12-16 PROCEDURE — 6370000000 HC RX 637 (ALT 250 FOR IP)

## 2024-12-16 PROCEDURE — 80048 BASIC METABOLIC PNL TOTAL CA: CPT

## 2024-12-16 PROCEDURE — 93925 LOWER EXTREMITY STUDY: CPT | Performed by: SURGERY

## 2024-12-16 PROCEDURE — 71045 X-RAY EXAM CHEST 1 VIEW: CPT

## 2024-12-16 PROCEDURE — 85025 COMPLETE CBC W/AUTO DIFF WBC: CPT

## 2024-12-16 PROCEDURE — 2580000003 HC RX 258

## 2024-12-16 PROCEDURE — 83036 HEMOGLOBIN GLYCOSYLATED A1C: CPT

## 2024-12-16 PROCEDURE — 2700000000 HC OXYGEN THERAPY PER DAY

## 2024-12-16 PROCEDURE — 94640 AIRWAY INHALATION TREATMENT: CPT

## 2024-12-16 PROCEDURE — 93306 TTE W/DOPPLER COMPLETE: CPT

## 2024-12-16 PROCEDURE — 6370000000 HC RX 637 (ALT 250 FOR IP): Performed by: INTERNAL MEDICINE

## 2024-12-16 PROCEDURE — 93306 TTE W/DOPPLER COMPLETE: CPT | Performed by: INTERNAL MEDICINE

## 2024-12-16 PROCEDURE — 99238 HOSP IP/OBS DSCHRG MGMT 30/<: CPT | Performed by: INTERNAL MEDICINE

## 2024-12-16 PROCEDURE — 82947 ASSAY GLUCOSE BLOOD QUANT: CPT

## 2024-12-16 PROCEDURE — 93925 LOWER EXTREMITY STUDY: CPT

## 2024-12-16 PROCEDURE — 99223 1ST HOSP IP/OBS HIGH 75: CPT | Performed by: INTERNAL MEDICINE

## 2024-12-16 PROCEDURE — 2580000003 HC RX 258: Performed by: INTERNAL MEDICINE

## 2024-12-16 PROCEDURE — 6360000002 HC RX W HCPCS: Performed by: INTERNAL MEDICINE

## 2024-12-16 PROCEDURE — 36415 COLL VENOUS BLD VENIPUNCTURE: CPT

## 2024-12-16 RX ORDER — INSULIN GLARGINE 300 U/ML
90 INJECTION, SOLUTION SUBCUTANEOUS 2 TIMES DAILY
Qty: 3 ML | Refills: 0
Start: 2024-12-16

## 2024-12-16 RX ORDER — GLIPIZIDE 5 MG/1
5 TABLET ORAL
Status: DISCONTINUED | OUTPATIENT
Start: 2024-12-16 | End: 2024-12-16 | Stop reason: HOSPADM

## 2024-12-16 RX ORDER — TAMSULOSIN HYDROCHLORIDE 0.4 MG/1
0.4 CAPSULE ORAL 2 TIMES DAILY
Qty: 30 CAPSULE | Refills: 0
Start: 2024-12-16

## 2024-12-16 RX ORDER — INSULIN GLARGINE 100 [IU]/ML
90 INJECTION, SOLUTION SUBCUTANEOUS 2 TIMES DAILY
Status: DISCONTINUED | OUTPATIENT
Start: 2024-12-16 | End: 2024-12-16 | Stop reason: HOSPADM

## 2024-12-16 RX ORDER — FUROSEMIDE 20 MG/1
20 TABLET ORAL DAILY
Qty: 30 TABLET | Refills: 0
Start: 2024-12-18 | End: 2026-06-05

## 2024-12-16 RX ORDER — GABAPENTIN 100 MG/1
100 CAPSULE ORAL DAILY
Qty: 90 CAPSULE | Refills: 0
Start: 2024-12-16 | End: 2025-03-16

## 2024-12-16 RX ORDER — PREDNISONE 20 MG/1
20 TABLET ORAL DAILY
Status: DISCONTINUED | OUTPATIENT
Start: 2024-12-16 | End: 2024-12-16 | Stop reason: HOSPADM

## 2024-12-16 RX ORDER — GABAPENTIN 100 MG/1
200 CAPSULE ORAL NIGHTLY
Qty: 60 CAPSULE | Refills: 0
Start: 2024-12-16 | End: 2025-03-16

## 2024-12-16 RX ORDER — PREDNISONE 20 MG/1
20 TABLET ORAL DAILY
Qty: 4 TABLET | Refills: 0 | Status: SHIPPED | OUTPATIENT
Start: 2024-12-17 | End: 2024-12-21

## 2024-12-16 RX ORDER — PREDNISONE 20 MG/1
20 TABLET ORAL DAILY
Qty: 4 TABLET | Refills: 0 | Status: SHIPPED | OUTPATIENT
Start: 2024-12-17 | End: 2024-12-16

## 2024-12-16 RX ORDER — CARVEDILOL 6.25 MG/1
6.25 TABLET ORAL 2 TIMES DAILY WITH MEALS
Qty: 60 TABLET | Refills: 0
Start: 2024-12-16 | End: 2026-02-04

## 2024-12-16 RX ADMIN — RIFAXIMIN 600 MG: 200 TABLET ORAL at 10:00

## 2024-12-16 RX ADMIN — GLIPIZIDE 5 MG: 5 TABLET ORAL at 15:10

## 2024-12-16 RX ADMIN — SODIUM CHLORIDE 30 ML: 9 INJECTION, SOLUTION INTRAVENOUS at 00:16

## 2024-12-16 RX ADMIN — TRAMADOL HYDROCHLORIDE 50 MG: 50 TABLET ORAL at 03:38

## 2024-12-16 RX ADMIN — GABAPENTIN 100 MG: 100 CAPSULE ORAL at 10:00

## 2024-12-16 RX ADMIN — INSULIN HUMAN 14 UNITS: 100 INJECTION, SOLUTION PARENTERAL at 00:21

## 2024-12-16 RX ADMIN — TAMSULOSIN HYDROCHLORIDE 0.4 MG: 0.4 CAPSULE ORAL at 10:00

## 2024-12-16 RX ADMIN — CILOSTAZOL 100 MG: 100 TABLET ORAL at 10:00

## 2024-12-16 RX ADMIN — BUDESONIDE AND FORMOTEROL FUMARATE DIHYDRATE 2 PUFF: 160; 4.5 AEROSOL RESPIRATORY (INHALATION) at 07:48

## 2024-12-16 RX ADMIN — FINASTERIDE 5 MG: 5 TABLET, FILM COATED ORAL at 10:00

## 2024-12-16 RX ADMIN — CLOPIDOGREL BISULFATE 75 MG: 75 TABLET ORAL at 10:00

## 2024-12-16 RX ADMIN — EMPAGLIFLOZIN 10 MG: 10 TABLET, FILM COATED ORAL at 10:01

## 2024-12-16 RX ADMIN — APIXABAN 2.5 MG: 2.5 TABLET, FILM COATED ORAL at 10:00

## 2024-12-16 RX ADMIN — CARVEDILOL 6.25 MG: 3.12 TABLET, FILM COATED ORAL at 10:00

## 2024-12-16 RX ADMIN — Medication 9.3 UNITS/HR: at 00:13

## 2024-12-16 RX ADMIN — INSULIN GLARGINE 90 UNITS: 100 INJECTION, SOLUTION SUBCUTANEOUS at 10:01

## 2024-12-16 RX ADMIN — ATORVASTATIN CALCIUM 40 MG: 40 TABLET, FILM COATED ORAL at 10:00

## 2024-12-16 RX ADMIN — RIFAXIMIN 600 MG: 200 TABLET ORAL at 15:10

## 2024-12-16 RX ADMIN — CALCIUM GLUCONATE 2000 MG: 98 INJECTION, SOLUTION INTRAVENOUS at 09:38

## 2024-12-16 RX ADMIN — PREDNISONE 20 MG: 20 TABLET ORAL at 10:00

## 2024-12-16 ASSESSMENT — PAIN SCALES - GENERAL
PAINLEVEL_OUTOF10: 7
PAINLEVEL_OUTOF10: 4

## 2024-12-16 ASSESSMENT — PAIN DESCRIPTION - LOCATION: LOCATION: LEG

## 2024-12-16 ASSESSMENT — PAIN DESCRIPTION - DESCRIPTORS: DESCRIPTORS: ACHING

## 2024-12-16 ASSESSMENT — PAIN DESCRIPTION - ORIENTATION: ORIENTATION: RIGHT

## 2024-12-16 NOTE — CONSULTS
[facility]  In Patient Cardiology Consult      Date:   12/16/2024  Patient name: Ranjeet Wagoner  Date of admission:  12/14/2024  8:15 PM  MRN:   [id number]  YOB: 1950    Reason for Admission: Worsening shortness of breath and leg pain  CHIEF COMPLAINT: Worsening shortness of breath and leg pain    History Obtained From: The patient and chart    HISTORY OF PRESENT ILLNESS:    This is a 74-year-old male.  He presented to the emergency room.  He is complaining of worsening shortness of breath.  He was concerned about recurrent PE as he has a history of PE.  He is also been complaining of leg pain.  He states that he has a history of severe PAD status post multiple interventions.  He was worried about the blood flow to his legs as well.  Denies any chest pains, orthopnea, PND, palpitations.  In the emergency room he was noted to be tachycardic, and appeared to be sinus tachycardia.  Currently he is sinus tachycardia with a heart rate of 100.  Denies any chest pains.    Past Medical History:    Past Medical History:   Diagnosis Date    Acquired posterior equinus, left 02/09/2024    Acute cystitis without hematuria 10/17/2024    Acute osteomyelitis of toe, right 08/30/2024    Acute prostatitis 12/16/2019    Amputation at midfoot ([facility]) 12/14/2023    Asthma     Balanitis 04/18/2017    BPH (benign prostatic hyperplasia)     Cellulitis of right foot 12/16/2019    Cervical spinal stenosis 11/14/2022    Chest pain 04/09/2008    Chronic ulcer of great toe ([facility]) 07/08/2023    Complete traumatic amputation of left midfoot, initial encounter ([facility]) 12/14/2023    Diabetic ulcer of left midfoot associated with type 2 diabetes mellitus, with fat layer exposed ([facility]) 11/07/2023    Disorder of kidney and ureter, unspecified 12/14/2023    Drug-induced adrenocortical insufficiency ([facility]) 03/20/2023    Edema 11/18/2022    Fatty (change of) liver, not elsewhere classified 03/20/2023    Feeling of incomplete  Referring Physician       SEBASTIÁN FUENTES,   Fellow                                             [place]*     Type of Study      TTE procedure:2D Echocardiogram, M-Mode, Doppler, Color Doppler.     Procedure Date  Date: 12/01/2020 Start: 10:02 AM    Study Location: [facility]  Technical Quality: Fair visualization    Indications:Positive blood cultures.    Comments:Positive COVID-19    Patient Status: Inpatient    Height: 74 inches Weight: 280.01 pounds BSA: 2.51 m^2 BMI: 35.95 kg/m^2    Rhythm: Within normal limits    Allergies    - *No Known Allergies.    CONCLUSIONS    Summary  Normal left ventricular size with normal hyperdynamic function.  Left ventricular ejection fraction 65 %.  Mild concentric left ventricular hypertrophy.    Signature  ----------------------------------------------------------------------------   Electronically signed by Martin Shea(Interpreting physician) on 12/01/2020   12:26 PM  ----------------------------------------------------------------------------    ----------------------------------------------------------------------------   Electronically signed by Clinton Niño RDCS(Sonographer) on 12/01/2020   12:07 PM  ----------------------------------------------------------------------------  FINDINGS  Left Atrium  Left atrium is normal in size.  Left Ventricle  Normal left ventricular size with normal hyperdynamic function.  Left ventricular ejection fraction 65 %.  Mild concentric left ventricular hypertrophy.  Right Atrium  Right atrium is normal in size.  Right Ventricle  Normal right ventricular size and function.  Mitral Valve  Normal mitral valve structure and function.  Aortic Valve  Normal aortic valve structure and function without stenosis or  regurgitation.  Tricuspid Valve  Normal tricuspid valve structure and function.  Pulmonic Valve  The pulmonic valve is normal in structure.    Miscellaneous  E/E' average = 12.8.    M-mode / 2D Measurements &  tachycardia-improving after treatment of COPD  Leg pain with history of severe peripheral vascular disease  History of PE, factor V, on Eliquis  Type 2 diabetes with severely elevated sugars  YASEMIN on CKD  Status post IVC filter  Known history of peripheral arterial disease status post multiple interventions    RECOMMENDATIONS:  Hold lisinopril due to YASEMIN  Hyperglycemia treatment per primary team  COPD treatment per primary team  Continue carvedilol  On Eliquis, Plavix, Pletal  2D echocardiogram is pending  Check lower extremity arterial Doppler    Discussed with patient and nursing.    Thank you for allowing me to participate in the care of this patient, please do not hesitate to call if you have any questions.    Yuan Dempsey DO, FACC, FACOI, RPVI, FILI, EVA  [facility]  [email or web address]  [phone]

## 2024-12-16 NOTE — PROGRESS NOTES
12/16/24 1211   Encounter Summary   Encounter Overview/Reason Spiritual/Emotional Needs   Service Provided For Patient   Referral/Consult From Rounding   Support System Spouse;Children;Family members;Temple/pat community   Last Encounter  12/16/24   Complexity of Encounter Moderate   Begin Time 1150   End Time  1212   Total Time Calculated 22 min   Spiritual/Emotional needs   Type Spiritual Support   Assessment/Intervention/Outcome   Assessment Calm   Intervention Active listening;Prayer (assurance of)/Sinclair   Outcome Comfort;Coping;Engaged in conversation;Expressed feelings, needs, and concerns;Expressed feelings of Renetta, Peace and/or Love;Expressed Gratitude;Optimistic     Spiritual Health History and Assessment/Progress Note  [facility] [place]    (P) Spiritual/Emotional Needs,  ,  ,      Name: Ranjeet Wagoner MRN: [id number]    Age: 74 y.o.     Sex: male   Language: English   Orthodoxy: Yazdanism   COPD with acute exacerbation (HCC)     Date: 12/16/2024            Total Time Calculated: (P) 22 min              Spiritual Assessment began in [facility]  PROGRESSIVE CARE        Referral/Consult From: (P) Rounding   Encounter Overview/Reason: (P) Spiritual/Emotional Needs  Service Provided For: (P) Patient    Pat, Belief, Meaning:   Patient identifies as spiritual, is connected with a pat tradition or spiritual practice, and has beliefs or practices that help with coping during difficult times  Family/Friends No family/friends present      Importance and Influence:  Patient has spiritual/personal beliefs that influence decisions regarding their health  Family/Friends No family/friends present    Community:  Patient is connected with a spiritual community and feels well-supported. Support system includes: Spouse/Partner, Children, Pat Community, Friends, and Extended family  Family/Friends No family/friends present    Assessment and Plan of Care:      rounding on PCU    Patient is sitting

## 2024-12-16 NOTE — DISCHARGE INSTR - DIET

## 2024-12-16 NOTE — DISCHARGE INSTRUCTIONS
Follow up with Dr. Taylor in one week-PLEASE CALL TO SCHEDULE    Follow up with cardiology in one week-PLEASE CALL TO SCHEDULE

## 2024-12-17 ENCOUNTER — FOLLOWUP TELEPHONE ENCOUNTER (OUTPATIENT)
Dept: INPATIENT UNIT | Age: 74
End: 2024-12-17

## 2024-12-17 LAB
EST. AVERAGE GLUCOSE BLD GHB EST-MCNC: 186 MG/DL
HBA1C MFR BLD: 8.1 % (ref 4–6)

## 2024-12-17 NOTE — DISCHARGE SUMMARY
[facility]                [address] [place] [address]                            DISCHARGE SUMMARY      PATIENT NAME: MIGUEL TRINH           : 1950  MED REC NO: [id number]                         ROOM: 0202  ACCOUNT NO: [id number]                       ADMIT DATE: 2024  PROVIDER: Mannie Meng MD      PERSONAL PHYSICIAN:  Jacek Taylor MD, [place], [place].    DIAGNOSES:    1. Chronic obstructive pulmonary disease exacerbation.  2. Congestive heart failure, acute on chronic diastolic, improved.  3. The presence of Streptococcus agalactiae in urine culture, however, 0-2 wbc's and no bacteria.  This is deemed to be least contamination, potentially colonization.  No antibiotics were given this presentation.  4. Diabetes mellitus type 2, uncontrolled, but on an insulin drip, now resolved.  The patient has complication including all toes amputated in the left foot together with the metatarsal amputation, at least 1 toe present on the right.  The patient now back on injectable insulin together with Amaryl while on steroids.    5. Lactic acid elevation on admission 2.5, down to 1.0.  Sepsis ruled out.    6. Factor V Leiden mutation with prior pulmonary embolism, deep venous thrombosis.  Fortunately, no pulmonary embolism on CTA of the chest and no deep venous thrombosis on 2024 on Doppler ultrasound of the lower extremities.    7. Prior smoker, 2 packs a day, 43 years, quit .  Other medical problems and issues set forth in the progress note of 2024, incorporated for reference herein.    HISTORY OF PRESENT ILLNESS AND HOSPITAL COURSE:  The patient is a 74-year-old white male, patient of Jacek Taylor [facility] in [place], [place].  The patient presented after his birthday party with increasing shortness of breath.  Unable to catch his breath.  Chest tightness.  No pain, no radiation state.  He was gasping to breath.  The patient was  medications continued without change:  1. Acetaminophen (Tylenol) 650 mg p.o. q.6 hours p.r.n. pain and fever.  2. Albuterol sulfate HFA 2 puffs every 6 hours p.r.n. shortness of breath, wheezing.  3. Apixaban (Eliquis) 2.5 mg p.o. b.i.d.   4. The patient's own volition; apple cider vinegar 300 mg tabs p.o. daily.  5. Atorvastatin (Lipitor) 40 mg p.o. daily.  6. Bupropion (Wellbutrin) 100 mg p.o. b.i.d.  7. Pletal (Cilostazol) 100 mg p.o. b.i.d.   8. Clopidogrel (Plavix) 75 mg p.o. daily.  9. Flexeril (cyclobenzaprine) 10 mg p.o. b.i.d. p.r.n. muscle spasm.  10. Finasteride (Proscar) 5 mg p.o. daily.    11. Advair (fluticasone/salmeterol) 250/50 one inhalation twice daily.  12. Lispro-aabc correction dosing.  13. Xifaxan (Rifaximin) 550 mg p.o. 3 times a day.  14. Testosterone 1 mL injection twice monthly.  15. Tramadol (Ultram) 50 mg every 6 hours p.r.n. pain.    16. Triamcinolone acetonide (Kenalog) 0.1% paste topically, mouth and throat twice daily.  17. Trulicity (dulaglutide) 1.5 mg/0.5 mL subcutaneously, 0.5 mL weekly.  Specifically discontinued:  1. Humalog insulin and lisinopril given renal function.  2. Loperamide (Imodium).  3. Loratadine (Claritin).    FOLLOWUP:  With the patient's personal physician,   Jacek Taylor MD, [place], [place].    Any aspect of the patient's care not discussed in the chart and/or dictation will be addressed and treated as an outpatient.    The patient's medications have been reviewed including, but not limited to, prehospital, hospital, and discharge medications.  The patient and/or the patient's personal representatives were specifically advised the only medications to be taken are those set forth in the discharge orders and no other medications should be taken.  Any prior medications not on the discharge orders are specifically discontinued.          SEBASTIÁN FUENTES MD      D:  12/16/2024 18:05:00     T:  12/17/2024 02:56:06     FRANCISCA/LJ  Job #:  [id number]     Doc#:

## 2024-12-17 NOTE — PROGRESS NOTES
CLINICAL PHARMACY NOTE: MEDS TO BEDS    Total # of Prescriptions Filled: 1   The following medications were delivered to the patient:  Prednisone    Additional Documentation:

## 2024-12-19 LAB
MICROORGANISM SPEC CULT: NORMAL
MICROORGANISM SPEC CULT: NORMAL
SERVICE CMNT-IMP: NORMAL
SERVICE CMNT-IMP: NORMAL
SPECIMEN DESCRIPTION: NORMAL
SPECIMEN DESCRIPTION: NORMAL

## 2024-12-19 NOTE — PROGRESS NOTES
Physician Progress Note      PATIENT:               MIGUEL TRINH  CSN #:                  [id number]  :                       1950  ADMIT DATE:       2024 8:15 PM  DISCH DATE:        2024 4:38 PM  RESPONDING  PROVIDER #:        Mannie Meng MD          QUERY TEXT:    Pt admitted with Congestive heart failure, acute on chronic diastolic. Pt   noted to also have CAD, HTN. If possible, please document in progress notes   and discharge summary the etiology of CHF, if able to be determined.  The medical record reflects the following:  Risk Factors: HTN, CAD    Clinical Indicators: DISCHARGE SUMMARY  The patient is a 74-year-old   white male The patient's other studies; chest x-ray with venous congestion and   perihilar pulmonary consolidation, consistent with pulmonary edema.   Underlying coronary artery atherosclerotic disease.  Internal Medicine PN   CHF--due to kidney function restart Lasix   2024 and hold lisinopril until see by personal physician.  H&P 12/15 Hypertension    Treatment: IV Lasix  Thank You  Hiren Wu [facility] CDS  Options provided:  -- CHF due to Hypertensive Heart Disease  -- CHF due to CAD  -- Other - I will add my own diagnosis  -- Disagree - Not applicable / Not valid  -- Disagree - Clinically unable to determine / Unknown  -- Refer to Clinical Documentation Reviewer    PROVIDER RESPONSE TEXT:    This patient has CHF due to CAD.    Query created by: Hiren Wu on 2024 6:37 AM      Electronically signed by:  Mannie Meng MD 2024 12:39 PM

## 2025-05-03 ENCOUNTER — APPOINTMENT (OUTPATIENT)
Dept: CT IMAGING | Age: 75
DRG: 194 | End: 2025-05-03
Payer: MEDICARE

## 2025-05-03 ENCOUNTER — HOSPITAL ENCOUNTER (INPATIENT)
Age: 75
LOS: 1 days | Discharge: HOME | DRG: 194 | End: 2025-05-04
Attending: EMERGENCY MEDICINE | Admitting: FAMILY MEDICINE
Payer: MEDICARE

## 2025-05-03 ENCOUNTER — APPOINTMENT (OUTPATIENT)
Dept: GENERAL RADIOLOGY | Age: 75
DRG: 194 | End: 2025-05-03
Payer: MEDICARE

## 2025-05-03 DIAGNOSIS — A41.9 SEPSIS, DUE TO UNSPECIFIED ORGANISM, UNSPECIFIED WHETHER ACUTE ORGAN DYSFUNCTION PRESENT (HCC): ICD-10-CM

## 2025-05-03 DIAGNOSIS — J44.1 COPD EXACERBATION (HCC): ICD-10-CM

## 2025-05-03 DIAGNOSIS — J18.9 PNEUMONIA DUE TO INFECTIOUS ORGANISM, UNSPECIFIED LATERALITY, UNSPECIFIED PART OF LUNG: Primary | ICD-10-CM

## 2025-05-03 LAB
ALBUMIN SERPL-MCNC: 3.8 G/DL (ref 3.5–5.2)
ALBUMIN/GLOB SERPL: 1.2 {RATIO} (ref 1–2.5)
ALP SERPL-CCNC: 122 U/L (ref 40–129)
ALT SERPL-CCNC: 17 U/L (ref 5–41)
ANION GAP SERPL CALCULATED.3IONS-SCNC: 17 MMOL/L (ref 9–17)
AST SERPL-CCNC: 13 U/L
BACTERIA URNS QL MICRO: ABNORMAL
BASOPHILS # BLD: <0.03 K/UL (ref 0–0.2)
BASOPHILS NFR BLD: 0 % (ref 0–2)
BILIRUB SERPL-MCNC: 1 MG/DL (ref 0.3–1.2)
BILIRUB UR QL STRIP: NEGATIVE
BNP SERPL-MCNC: 84 PG/ML (ref 0–125)
BUN SERPL-MCNC: 21 MG/DL (ref 8–23)
BUN/CREAT SERPL: 18 (ref 9–20)
CALCIUM SERPL-MCNC: 9.2 MG/DL (ref 8.6–10.4)
CHARACTER UR: ABNORMAL
CHLORIDE SERPL-SCNC: 99 MMOL/L (ref 98–107)
CLARITY UR: CLEAR
CO2 SERPL-SCNC: 21 MMOL/L (ref 20–31)
COLOR UR: YELLOW
CREAT SERPL-MCNC: 1.2 MG/DL (ref 0.7–1.2)
D DIMER PPP FEU-MCNC: 0.94 UG/ML FEU (ref 0–0.59)
EOSINOPHIL # BLD: <0.03 K/UL (ref 0–0.44)
EOSINOPHILS RELATIVE PERCENT: 0 % (ref 1–4)
EPI CELLS #/AREA URNS HPF: ABNORMAL /HPF (ref 0–5)
ERYTHROCYTE [DISTWIDTH] IN BLOOD BY AUTOMATED COUNT: 15.3 % (ref 11.8–14.4)
EST. AVERAGE GLUCOSE BLD GHB EST-MCNC: 174 MG/DL
FLUAV AG SPEC QL: NEGATIVE
FLUBV AG SPEC QL: NEGATIVE
GFR, ESTIMATED: 64 ML/MIN/1.73M2
GLUCOSE BLD-MCNC: 408 MG/DL (ref 75–110)
GLUCOSE BLD-MCNC: 433 MG/DL (ref 75–110)
GLUCOSE BLD-MCNC: 472 MG/DL (ref 75–110)
GLUCOSE SERPL-MCNC: 200 MG/DL (ref 70–99)
GLUCOSE UR STRIP-MCNC: NEGATIVE MG/DL
HBA1C MFR BLD: 7.7 % (ref 4–6)
HCT VFR BLD AUTO: 41.3 % (ref 40.7–50.3)
HGB BLD-MCNC: 13.7 G/DL (ref 13–17)
HGB UR QL STRIP.AUTO: ABNORMAL
IMM GRANULOCYTES # BLD AUTO: <0.03 K/UL (ref 0–0.3)
IMM GRANULOCYTES NFR BLD: 0 %
KETONES UR STRIP-MCNC: ABNORMAL MG/DL
LACTATE BLDV-SCNC: 1.5 MMOL/L
LEUKOCYTE ESTERASE UR QL STRIP: NEGATIVE
LYMPHOCYTES NFR BLD: 1.07 K/UL (ref 1.1–3.7)
LYMPHOCYTES RELATIVE PERCENT: 10 % (ref 24–43)
MCH RBC QN AUTO: 30.3 PG (ref 25.2–33.5)
MCHC RBC AUTO-ENTMCNC: 33.2 G/DL (ref 25.2–33.5)
MCV RBC AUTO: 91.4 FL (ref 82.6–102.9)
MONOCYTES NFR BLD: 1.16 K/UL (ref 0.1–1.2)
MONOCYTES NFR BLD: 11 % (ref 3–12)
NEUTROPHILS NFR BLD: 79 % (ref 36–65)
NEUTS SEG NFR BLD: 8.59 K/UL (ref 1.5–8.1)
NITRITE UR QL STRIP: NEGATIVE
PH UR STRIP: 6 [PH] (ref 5–6)
PLATELET # BLD AUTO: 96 K/UL (ref 138–453)
PMV BLD AUTO: 11.6 FL (ref 8.1–13.5)
POTASSIUM SERPL-SCNC: 4.5 MMOL/L (ref 3.7–5.3)
PROCALCITONIN SERPL-MCNC: 0.36 NG/ML
PROT SERPL-MCNC: 6.9 G/DL (ref 6.4–8.3)
PROT UR STRIP-MCNC: NEGATIVE MG/DL
RBC # BLD AUTO: 4.52 M/UL (ref 4.21–5.77)
RBC #/AREA URNS HPF: ABNORMAL /HPF (ref 0–4)
SARS-COV-2 RDRP RESP QL NAA+PROBE: NOT DETECTED
SODIUM SERPL-SCNC: 137 MMOL/L (ref 135–144)
SP GR UR STRIP: 1.01 (ref 1.01–1.02)
SPECIMEN DESCRIPTION: NORMAL
TROPONIN I SERPL HS-MCNC: 38 NG/L (ref 0–22)
TROPONIN I SERPL HS-MCNC: 39 NG/L (ref 0–22)
UROBILINOGEN UR STRIP-ACNC: NORMAL EU/DL (ref 0–1)
WBC #/AREA URNS HPF: ABNORMAL /HPF (ref 0–4)
WBC OTHER # BLD: 10.9 K/UL (ref 3.5–11.3)

## 2025-05-03 PROCEDURE — 82947 ASSAY GLUCOSE BLOOD QUANT: CPT

## 2025-05-03 PROCEDURE — 94668 MNPJ CHEST WALL SBSQ: CPT

## 2025-05-03 PROCEDURE — 96375 TX/PRO/DX INJ NEW DRUG ADDON: CPT

## 2025-05-03 PROCEDURE — 83880 ASSAY OF NATRIURETIC PEPTIDE: CPT

## 2025-05-03 PROCEDURE — 87040 BLOOD CULTURE FOR BACTERIA: CPT

## 2025-05-03 PROCEDURE — 93005 ELECTROCARDIOGRAM TRACING: CPT | Performed by: EMERGENCY MEDICINE

## 2025-05-03 PROCEDURE — 71045 X-RAY EXAM CHEST 1 VIEW: CPT

## 2025-05-03 PROCEDURE — 99222 1ST HOSP IP/OBS MODERATE 55: CPT

## 2025-05-03 PROCEDURE — 83036 HEMOGLOBIN GLYCOSYLATED A1C: CPT

## 2025-05-03 PROCEDURE — 94761 N-INVAS EAR/PLS OXIMETRY MLT: CPT

## 2025-05-03 PROCEDURE — 80053 COMPREHEN METABOLIC PANEL: CPT

## 2025-05-03 PROCEDURE — 6370000000 HC RX 637 (ALT 250 FOR IP): Performed by: EMERGENCY MEDICINE

## 2025-05-03 PROCEDURE — 94669 MECHANICAL CHEST WALL OSCILL: CPT

## 2025-05-03 PROCEDURE — 87804 INFLUENZA ASSAY W/OPTIC: CPT

## 2025-05-03 PROCEDURE — 2500000003 HC RX 250 WO HCPCS: Performed by: EMERGENCY MEDICINE

## 2025-05-03 PROCEDURE — 85379 FIBRIN DEGRADATION QUANT: CPT

## 2025-05-03 PROCEDURE — 2500000003 HC RX 250 WO HCPCS

## 2025-05-03 PROCEDURE — 94640 AIRWAY INHALATION TREATMENT: CPT

## 2025-05-03 PROCEDURE — 2060000000 HC ICU INTERMEDIATE R&B

## 2025-05-03 PROCEDURE — 87635 SARS-COV-2 COVID-19 AMP PRB: CPT

## 2025-05-03 PROCEDURE — 6360000002 HC RX W HCPCS: Performed by: EMERGENCY MEDICINE

## 2025-05-03 PROCEDURE — 85025 COMPLETE CBC W/AUTO DIFF WBC: CPT

## 2025-05-03 PROCEDURE — 94760 N-INVAS EAR/PLS OXIMETRY 1: CPT

## 2025-05-03 PROCEDURE — 71260 CT THORAX DX C+: CPT

## 2025-05-03 PROCEDURE — 84484 ASSAY OF TROPONIN QUANT: CPT

## 2025-05-03 PROCEDURE — 99233 SBSQ HOSP IP/OBS HIGH 50: CPT | Performed by: FAMILY MEDICINE

## 2025-05-03 PROCEDURE — 83605 ASSAY OF LACTIC ACID: CPT

## 2025-05-03 PROCEDURE — 6370000000 HC RX 637 (ALT 250 FOR IP): Performed by: FAMILY MEDICINE

## 2025-05-03 PROCEDURE — 2580000003 HC RX 258: Performed by: EMERGENCY MEDICINE

## 2025-05-03 PROCEDURE — 94667 MNPJ CHEST WALL 1ST: CPT

## 2025-05-03 PROCEDURE — 81001 URINALYSIS AUTO W/SCOPE: CPT

## 2025-05-03 PROCEDURE — 96374 THER/PROPH/DIAG INJ IV PUSH: CPT

## 2025-05-03 PROCEDURE — 99285 EMERGENCY DEPT VISIT HI MDM: CPT

## 2025-05-03 PROCEDURE — 6360000004 HC RX CONTRAST MEDICATION: Performed by: EMERGENCY MEDICINE

## 2025-05-03 PROCEDURE — 6370000000 HC RX 637 (ALT 250 FOR IP)

## 2025-05-03 PROCEDURE — 84145 PROCALCITONIN (PCT): CPT

## 2025-05-03 PROCEDURE — 36415 COLL VENOUS BLD VENIPUNCTURE: CPT

## 2025-05-03 RX ORDER — 0.9 % SODIUM CHLORIDE 0.9 %
1000 INTRAVENOUS SOLUTION INTRAVENOUS ONCE
Status: COMPLETED | OUTPATIENT
Start: 2025-05-03 | End: 2025-05-03

## 2025-05-03 RX ORDER — ONDANSETRON 2 MG/ML
4 INJECTION INTRAMUSCULAR; INTRAVENOUS EVERY 6 HOURS PRN
Status: DISCONTINUED | OUTPATIENT
Start: 2025-05-03 | End: 2025-05-04 | Stop reason: HOSPADM

## 2025-05-03 RX ORDER — BLOOD SUGAR DIAGNOSTIC
1 STRIP MISCELLANEOUS 3 TIMES DAILY
COMMUNITY
Start: 2025-04-16

## 2025-05-03 RX ORDER — INSULIN LISPRO 100 [IU]/ML
20 INJECTION, SOLUTION INTRAVENOUS; SUBCUTANEOUS ONCE
Status: COMPLETED | OUTPATIENT
Start: 2025-05-03 | End: 2025-05-03

## 2025-05-03 RX ORDER — METHYLPREDNISOLONE SODIUM SUCCINATE 125 MG/2ML
125 INJECTION INTRAMUSCULAR; INTRAVENOUS ONCE
Status: COMPLETED | OUTPATIENT
Start: 2025-05-03 | End: 2025-05-03

## 2025-05-03 RX ORDER — DULAGLUTIDE 3 MG/.5ML
3 INJECTION, SOLUTION SUBCUTANEOUS WEEKLY
COMMUNITY
Start: 2025-03-27

## 2025-05-03 RX ORDER — AZITHROMYCIN 250 MG/1
500 TABLET, FILM COATED ORAL EVERY 24 HOURS
Status: DISCONTINUED | OUTPATIENT
Start: 2025-05-04 | End: 2025-05-04 | Stop reason: HOSPADM

## 2025-05-03 RX ORDER — DEXTROSE MONOHYDRATE 100 MG/ML
INJECTION, SOLUTION INTRAVENOUS CONTINUOUS PRN
Status: DISCONTINUED | OUTPATIENT
Start: 2025-05-03 | End: 2025-05-04 | Stop reason: HOSPADM

## 2025-05-03 RX ORDER — ATORVASTATIN CALCIUM 80 MG/1
80 TABLET, FILM COATED ORAL DAILY
COMMUNITY
Start: 2025-03-26

## 2025-05-03 RX ORDER — ONDANSETRON 4 MG/1
4 TABLET, ORALLY DISINTEGRATING ORAL EVERY 8 HOURS PRN
Status: DISCONTINUED | OUTPATIENT
Start: 2025-05-03 | End: 2025-05-04 | Stop reason: HOSPADM

## 2025-05-03 RX ORDER — GLUCAGON 1 MG/ML
1 KIT INJECTION PRN
Status: DISCONTINUED | OUTPATIENT
Start: 2025-05-03 | End: 2025-05-04 | Stop reason: HOSPADM

## 2025-05-03 RX ORDER — ACETAMINOPHEN 650 MG/1
650 SUPPOSITORY RECTAL EVERY 6 HOURS PRN
Status: DISCONTINUED | OUTPATIENT
Start: 2025-05-03 | End: 2025-05-04 | Stop reason: HOSPADM

## 2025-05-03 RX ORDER — CARVEDILOL 3.12 MG/1
6.25 TABLET ORAL 2 TIMES DAILY WITH MEALS
Status: DISCONTINUED | OUTPATIENT
Start: 2025-05-03 | End: 2025-05-04 | Stop reason: HOSPADM

## 2025-05-03 RX ORDER — SODIUM CHLORIDE 0.9 % (FLUSH) 0.9 %
5-40 SYRINGE (ML) INJECTION PRN
Status: DISCONTINUED | OUTPATIENT
Start: 2025-05-03 | End: 2025-05-04 | Stop reason: HOSPADM

## 2025-05-03 RX ORDER — INSULIN LISPRO 100 [IU]/ML
10 INJECTION, SOLUTION INTRAVENOUS; SUBCUTANEOUS ONCE
Status: COMPLETED | OUTPATIENT
Start: 2025-05-03 | End: 2025-05-03

## 2025-05-03 RX ORDER — BENZONATATE 100 MG/1
100 CAPSULE ORAL 3 TIMES DAILY PRN
Status: DISCONTINUED | OUTPATIENT
Start: 2025-05-03 | End: 2025-05-04 | Stop reason: HOSPADM

## 2025-05-03 RX ORDER — CLOPIDOGREL BISULFATE 75 MG/1
75 TABLET ORAL DAILY
Status: DISCONTINUED | OUTPATIENT
Start: 2025-05-04 | End: 2025-05-04 | Stop reason: HOSPADM

## 2025-05-03 RX ORDER — ATORVASTATIN CALCIUM 40 MG/1
80 TABLET, FILM COATED ORAL DAILY
Status: DISCONTINUED | OUTPATIENT
Start: 2025-05-03 | End: 2025-05-04 | Stop reason: HOSPADM

## 2025-05-03 RX ORDER — POLYETHYLENE GLYCOL 3350 17 G/17G
17 POWDER, FOR SOLUTION ORAL DAILY PRN
Status: DISCONTINUED | OUTPATIENT
Start: 2025-05-03 | End: 2025-05-04 | Stop reason: HOSPADM

## 2025-05-03 RX ORDER — IPRATROPIUM BROMIDE AND ALBUTEROL SULFATE 2.5; .5 MG/3ML; MG/3ML
1 SOLUTION RESPIRATORY (INHALATION) ONCE
Status: COMPLETED | OUTPATIENT
Start: 2025-05-03 | End: 2025-05-03

## 2025-05-03 RX ORDER — GABAPENTIN 300 MG/1
300 CAPSULE ORAL 3 TIMES DAILY
Status: DISCONTINUED | OUTPATIENT
Start: 2025-05-03 | End: 2025-05-04 | Stop reason: HOSPADM

## 2025-05-03 RX ORDER — TRAMADOL HYDROCHLORIDE 50 MG/1
50 TABLET ORAL EVERY 6 HOURS PRN
Status: DISCONTINUED | OUTPATIENT
Start: 2025-05-03 | End: 2025-05-04 | Stop reason: HOSPADM

## 2025-05-03 RX ORDER — CILOSTAZOL 100 MG/1
100 TABLET ORAL 2 TIMES DAILY
Status: DISCONTINUED | OUTPATIENT
Start: 2025-05-03 | End: 2025-05-04 | Stop reason: HOSPADM

## 2025-05-03 RX ORDER — TESTOSTERONE CYPIONATE 200 MG/ML
200 INJECTION, SOLUTION INTRAMUSCULAR
COMMUNITY
Start: 2025-04-09

## 2025-05-03 RX ORDER — IPRATROPIUM BROMIDE AND ALBUTEROL SULFATE 2.5; .5 MG/3ML; MG/3ML
1 SOLUTION RESPIRATORY (INHALATION)
Status: DISCONTINUED | OUTPATIENT
Start: 2025-05-03 | End: 2025-05-04 | Stop reason: HOSPADM

## 2025-05-03 RX ORDER — BUPROPION HYDROCHLORIDE 100 MG/1
100 TABLET, EXTENDED RELEASE ORAL 2 TIMES DAILY
Status: DISCONTINUED | OUTPATIENT
Start: 2025-05-03 | End: 2025-05-04 | Stop reason: HOSPADM

## 2025-05-03 RX ORDER — TAMSULOSIN HYDROCHLORIDE 0.4 MG/1
0.4 CAPSULE ORAL 2 TIMES DAILY
Status: DISCONTINUED | OUTPATIENT
Start: 2025-05-03 | End: 2025-05-04 | Stop reason: HOSPADM

## 2025-05-03 RX ORDER — INSULIN LISPRO 100 [IU]/ML
0-16 INJECTION, SOLUTION INTRAVENOUS; SUBCUTANEOUS
Status: DISCONTINUED | OUTPATIENT
Start: 2025-05-03 | End: 2025-05-04 | Stop reason: HOSPADM

## 2025-05-03 RX ORDER — ACETAMINOPHEN 325 MG/1
650 TABLET ORAL EVERY 6 HOURS PRN
Status: DISCONTINUED | OUTPATIENT
Start: 2025-05-03 | End: 2025-05-04 | Stop reason: HOSPADM

## 2025-05-03 RX ORDER — INSULIN GLARGINE 100 [IU]/ML
72 INJECTION, SOLUTION SUBCUTANEOUS 2 TIMES DAILY
Status: DISCONTINUED | OUTPATIENT
Start: 2025-05-03 | End: 2025-05-04

## 2025-05-03 RX ORDER — TIRZEPATIDE 5 MG/.5ML
5 INJECTION, SOLUTION SUBCUTANEOUS WEEKLY
COMMUNITY
Start: 2025-04-28 | End: 2025-07-21

## 2025-05-03 RX ORDER — GUAIFENESIN 600 MG/1
600 TABLET, EXTENDED RELEASE ORAL 2 TIMES DAILY
Status: DISCONTINUED | OUTPATIENT
Start: 2025-05-03 | End: 2025-05-04 | Stop reason: HOSPADM

## 2025-05-03 RX ORDER — SODIUM CHLORIDE 0.9 % (FLUSH) 0.9 %
5-40 SYRINGE (ML) INJECTION EVERY 12 HOURS SCHEDULED
Status: DISCONTINUED | OUTPATIENT
Start: 2025-05-03 | End: 2025-05-04 | Stop reason: HOSPADM

## 2025-05-03 RX ORDER — FUROSEMIDE 20 MG/1
20 TABLET ORAL DAILY
Status: DISCONTINUED | OUTPATIENT
Start: 2025-05-04 | End: 2025-05-04 | Stop reason: HOSPADM

## 2025-05-03 RX ORDER — BUPROPION HYDROCHLORIDE 100 MG/1
100 TABLET, EXTENDED RELEASE ORAL 2 TIMES DAILY
COMMUNITY
Start: 2025-04-22

## 2025-05-03 RX ORDER — IPRATROPIUM BROMIDE AND ALBUTEROL SULFATE 2.5; .5 MG/3ML; MG/3ML
1 SOLUTION RESPIRATORY (INHALATION) EVERY 4 HOURS PRN
Status: DISCONTINUED | OUTPATIENT
Start: 2025-05-03 | End: 2025-05-04 | Stop reason: HOSPADM

## 2025-05-03 RX ORDER — GABAPENTIN 300 MG/1
300 CAPSULE ORAL 3 TIMES DAILY
COMMUNITY
Start: 2025-03-20

## 2025-05-03 RX ORDER — SODIUM CHLORIDE 9 MG/ML
INJECTION, SOLUTION INTRAVENOUS PRN
Status: DISCONTINUED | OUTPATIENT
Start: 2025-05-03 | End: 2025-05-04 | Stop reason: HOSPADM

## 2025-05-03 RX ORDER — IOPAMIDOL 755 MG/ML
100 INJECTION, SOLUTION INTRAVASCULAR
Status: COMPLETED | OUTPATIENT
Start: 2025-05-03 | End: 2025-05-03

## 2025-05-03 RX ORDER — FINASTERIDE 5 MG/1
5 TABLET, FILM COATED ORAL DAILY
Status: DISCONTINUED | OUTPATIENT
Start: 2025-05-04 | End: 2025-05-04 | Stop reason: HOSPADM

## 2025-05-03 RX ADMIN — IPRATROPIUM BROMIDE AND ALBUTEROL SULFATE 1 DOSE: .5; 2.5 SOLUTION RESPIRATORY (INHALATION) at 23:21

## 2025-05-03 RX ADMIN — TRAMADOL HYDROCHLORIDE 50 MG: 50 TABLET, COATED ORAL at 22:43

## 2025-05-03 RX ADMIN — SODIUM CHLORIDE, PRESERVATIVE FREE 10 ML: 5 INJECTION INTRAVENOUS at 10:00

## 2025-05-03 RX ADMIN — BUPROPION HYDROCHLORIDE 100 MG: 100 TABLET, EXTENDED RELEASE ORAL at 21:25

## 2025-05-03 RX ADMIN — WATER 1000 MG: 1 INJECTION INTRAMUSCULAR; INTRAVENOUS; SUBCUTANEOUS at 05:27

## 2025-05-03 RX ADMIN — APIXABAN 2.5 MG: 2.5 TABLET, FILM COATED ORAL at 09:59

## 2025-05-03 RX ADMIN — CILOSTAZOL 100 MG: 100 TABLET ORAL at 09:59

## 2025-05-03 RX ADMIN — GABAPENTIN 300 MG: 300 CAPSULE ORAL at 09:59

## 2025-05-03 RX ADMIN — GUAIFENESIN 600 MG: 600 TABLET, EXTENDED RELEASE ORAL at 09:59

## 2025-05-03 RX ADMIN — CARVEDILOL 6.25 MG: 3.12 TABLET, FILM COATED ORAL at 17:22

## 2025-05-03 RX ADMIN — CARVEDILOL 6.25 MG: 3.12 TABLET, FILM COATED ORAL at 09:59

## 2025-05-03 RX ADMIN — IPRATROPIUM BROMIDE AND ALBUTEROL SULFATE 1 DOSE: .5; 2.5 SOLUTION RESPIRATORY (INHALATION) at 07:38

## 2025-05-03 RX ADMIN — TAMSULOSIN HYDROCHLORIDE 0.4 MG: 0.4 CAPSULE ORAL at 21:15

## 2025-05-03 RX ADMIN — ATORVASTATIN CALCIUM 80 MG: 40 TABLET, FILM COATED ORAL at 21:14

## 2025-05-03 RX ADMIN — CILOSTAZOL 100 MG: 100 TABLET ORAL at 21:15

## 2025-05-03 RX ADMIN — INSULIN LISPRO 20 UNITS: 100 INJECTION, SOLUTION INTRAVENOUS; SUBCUTANEOUS at 17:21

## 2025-05-03 RX ADMIN — GABAPENTIN 300 MG: 300 CAPSULE ORAL at 13:05

## 2025-05-03 RX ADMIN — INSULIN GLARGINE 72 UNITS: 100 INJECTION, SOLUTION SUBCUTANEOUS at 21:19

## 2025-05-03 RX ADMIN — IPRATROPIUM BROMIDE AND ALBUTEROL SULFATE 1 DOSE: .5; 2.5 SOLUTION RESPIRATORY (INHALATION) at 15:56

## 2025-05-03 RX ADMIN — TAMSULOSIN HYDROCHLORIDE 0.4 MG: 0.4 CAPSULE ORAL at 09:59

## 2025-05-03 RX ADMIN — GABAPENTIN 300 MG: 300 CAPSULE ORAL at 21:15

## 2025-05-03 RX ADMIN — GUAIFENESIN 600 MG: 600 TABLET, EXTENDED RELEASE ORAL at 21:15

## 2025-05-03 RX ADMIN — METHYLPREDNISOLONE SODIUM SUCCINATE 125 MG: 125 INJECTION INTRAMUSCULAR; INTRAVENOUS at 01:37

## 2025-05-03 RX ADMIN — IPRATROPIUM BROMIDE AND ALBUTEROL SULFATE 1 DOSE: .5; 2.5 SOLUTION RESPIRATORY (INHALATION) at 01:12

## 2025-05-03 RX ADMIN — INSULIN LISPRO 16 UNITS: 100 INJECTION, SOLUTION INTRAVENOUS; SUBCUTANEOUS at 13:05

## 2025-05-03 RX ADMIN — SODIUM CHLORIDE 1000 ML: 0.9 INJECTION, SOLUTION INTRAVENOUS at 02:34

## 2025-05-03 RX ADMIN — INSULIN LISPRO 10 UNITS: 100 INJECTION, SOLUTION INTRAVENOUS; SUBCUTANEOUS at 21:17

## 2025-05-03 RX ADMIN — INSULIN GLARGINE 72 UNITS: 100 INJECTION, SOLUTION SUBCUTANEOUS at 09:59

## 2025-05-03 RX ADMIN — BUPROPION HYDROCHLORIDE 100 MG: 100 TABLET, EXTENDED RELEASE ORAL at 15:28

## 2025-05-03 RX ADMIN — IPRATROPIUM BROMIDE AND ALBUTEROL SULFATE 1 DOSE: .5; 2.5 SOLUTION RESPIRATORY (INHALATION) at 11:31

## 2025-05-03 RX ADMIN — SODIUM CHLORIDE, PRESERVATIVE FREE 10 ML: 5 INJECTION INTRAVENOUS at 21:16

## 2025-05-03 RX ADMIN — INSULIN LISPRO 16 UNITS: 100 INJECTION, SOLUTION INTRAVENOUS; SUBCUTANEOUS at 21:18

## 2025-05-03 RX ADMIN — IPRATROPIUM BROMIDE AND ALBUTEROL SULFATE 1 DOSE: .5; 2.5 SOLUTION RESPIRATORY (INHALATION) at 19:55

## 2025-05-03 RX ADMIN — AZITHROMYCIN MONOHYDRATE 500 MG: 500 INJECTION, POWDER, LYOPHILIZED, FOR SOLUTION INTRAVENOUS at 05:32

## 2025-05-03 RX ADMIN — IPRATROPIUM BROMIDE AND ALBUTEROL SULFATE 1 DOSE: .5; 2.5 SOLUTION RESPIRATORY (INHALATION) at 05:38

## 2025-05-03 RX ADMIN — IOPAMIDOL 100 ML: 755 INJECTION, SOLUTION INTRAVENOUS at 03:00

## 2025-05-03 RX ADMIN — APIXABAN 2.5 MG: 2.5 TABLET, FILM COATED ORAL at 21:14

## 2025-05-03 ASSESSMENT — PAIN DESCRIPTION - ORIENTATION: ORIENTATION: LEFT

## 2025-05-03 ASSESSMENT — PAIN DESCRIPTION - DESCRIPTORS: DESCRIPTORS: THROBBING

## 2025-05-03 ASSESSMENT — PAIN SCALES - GENERAL
PAINLEVEL_OUTOF10: 5
PAINLEVEL_OUTOF10: 3
PAINLEVEL_OUTOF10: 4

## 2025-05-03 ASSESSMENT — PAIN DESCRIPTION - LOCATION: LOCATION: KNEE

## 2025-05-03 ASSESSMENT — PAIN - FUNCTIONAL ASSESSMENT: PAIN_FUNCTIONAL_ASSESSMENT: 0-10

## 2025-05-03 NOTE — H&P
HOSPITALIST ADMISSION H&P      REASON FOR ADMISSION:  Bilateral pneumonia  ESTIMATED LENGTH OF STAY:>2 midnights, 3-4 days    ATTENDING/ADMITTING PHYSICIAN: Dewey Awan MD  PCP: Jacek Taylor MD    HISTORY OF PRESENT ILLNESS:      The patient is a 74 y.o. male patient of Jacek Taylor MD who presents from ER with c/o SOB, cough ,and fever. Patient reports he has \"been battling something for 4-5 days\", has been having shortness of breath, productive cough, and the last night developed a fever of 101.8, so he had his wife and daughter bring him in for evaluation. Denies chest pain, nausea, vomiting, bowel, or bladder issues. Does report shortness of breath but describes as better than when he came to ER. Patient unable to describe color of phlegm as he does not cough it out.     Elevated D-dimer: Chronically elevated.     Elevated troponin: Most likely Type 2 MI- Related to oxygen supply and demand mismatch. Typically runs in the low to high 20's.     DMII: HgbA1C 8.1. Will start Mounjaro after he completes his Trulicity, dosing.     JANET: CPAP use.    In ER: Azithromycin, Rocephin, Duoneb x2, Solumedrol, IVF bolus x1.  CXR:  IMPRESSION:  1. No acute process.    CT Chest PE:   IMPRESSION:  1. No obvious saddle embolus on suboptimal study.  Repeat study was declined  by the treating ER team.  2. Bibasilar airspace opacities, left greater than right. Findings are  concerning for pneumonia.  3. Moderate emphysema with early fibrotic changes.  4. Severe coronary artery atherosclerosis.    EKG:   Sinus tachycardia--125  Incomplete right bundle branch block  Left posterior fascicular block  Abnormal ECG  When compared with ECG of 15-Dec-2024 10:35,  Left posterior fascicular block is now Present  Incomplete right bundle branch block has replaced Right bundle branch block  Minimal criteria for Anterior infarct are no longer Present.    Wounds and LDAs present prior to admission: See media.     See below for

## 2025-05-03 NOTE — ED NOTES
ED to inpatient nurses report     Chief Complaint   Patient presents with    Fever    Cough    Shortness of Breath      Present to ED from home  LOC: alert and orientated to name, place, date  Vital signs   Vitals:    05/03/25 0344 05/03/25 0345 05/03/25 0346 05/03/25 0441   BP:       Pulse: (!) 103 (!) 104  (!) 101   Resp: 16 17  15   Temp:       SpO2: (!) 89% (!) 87% 94% 91%   Weight:       Height:          Oxygen Baseline room air    Current needs required room air   SEPSIS: no   [1] Lactate X 2 ordered (Yes or No)  [yes] Antibiotics given (Yes or No)  [no] IV Fluids ordered (Yes or No)             [yes] IV completed (Yes or No)  [yes] Hourly Vital Signs (Validated)  [no] Outstanding Orders:     LDAs:   Peripheral IV 05/03/25 Distal;Left Forearm (Active)   Site Assessment Clean, dry & intact 05/03/25 0053   Line Status Blood return noted;Brisk blood return;Flushed 05/03/25 0053   Dressing Status New dressing applied 05/03/25 0053   Dressing Type Transparent 05/03/25 0053   Dressing Intervention New 05/03/25 0053     Mobility: Requires assistance * 1  Fall Risk:    Pending ED orders: moderate  Present condition: stable  Code Status: full  Consults: IP CONSULT TO CASE MANAGEMENT  [x]  Hospitalist  Completed  [] yes [] no Who: Aura  []  Medicine  Completed  [] yes [] No Who:   []  Cardiology  Completed  [] yes [] No Who:   []  GI   Completed  [] yes [] No Who:   []  Neurology  Completed  [] yes [] No Who:   []  Nephrology Completed  [] yes [] No Who:    []  Vascular  Completed  [] yes [] No Who:   []  Ortho  Completed  [] yes [] No Who:     []  Surgery  Completed  [] yes [] No Who:    []  Urology  Completed  [] yes [] No Who:    []  CT Surgery Completed  [] yes [] No Who:   []  Podiatry  Completed  [] yes [] No Who:    []  Other    Completed  [] yes [] No Who:  Interventions: Breathing tx, antibiotics, steroids  Important Events: Pt states he has a hx of COPD and PE, started having increase SOB over past 3 days,

## 2025-05-03 NOTE — ED PROVIDER NOTES
Course as of 05/03/25 0658   Sat May 03, 2025   0045 Twelve-lead EKG sinus tachycardia 100 100 bpm.  Normal exam axis with an incomplete bundle branch block.  No acute ST elevations or depressions with nonspecific ST-T wave changes. [NP]      ED Course User Index  [NP] Rajni Amaro MD        I have reviewed the disposition diagnosis with the patient and or their family/guardian.  I have answered their questions and givendischarge instructions.  They voiced understanding of these instructions and did not have any further questions or complaints.    DIAGNOSTIC RESULTS     EKG: All EKG's are interpreted by the Emergency Department Physician who either signs or Co-signs this chart in the absence of a cardiologist.    Please see ED course.    RADIOLOGY:   Radiologist interpretation of radiologic studies:     CT CHEST PULMONARY EMBOLISM W CONTRAST  Result Date: 5/3/2025  1. No obvious saddle embolus on suboptimal study.  Repeat study was declined by the treating ER team. 2. Bibasilar airspace opacities, left greater than right. Findings are concerning for pneumonia. 3. Moderate emphysema with early fibrotic changes. 4. Severe coronary artery atherosclerosis.     XR CHEST PORTABLE  Result Date: 5/3/2025  EXAM: 1 VIEW(S) XRAY OF THE CHEST 05/03/2025 01:16:38 AM COMPARISON: 12/14/2024 CLINICAL HISTORY: Dyspnea. Reason for exam: fever, shortness of breath. FINDINGS: LUNGS AND PLEURA: No consolidation. No pulmonary edema. No pleural effusion. No pneumothorax. HEART AND MEDIASTINUM: No acute abnormality of the cardiac and mediastinal silhouettes. BONES AND SOFT TISSUES: No acute osseous abnormality.     1. No acute process.       LABS:  Results for orders placed or performed during the hospital encounter of 05/03/25   RAPID INFLUENZA A/B ANTIGENS    Specimen: Nasopharyngeal   Result Value Ref Range    Flu A Antigen NEGATIVE NEGATIVE    Flu B Antigen NEGATIVE NEGATIVE   COVID-19, Rapid    Specimen: Nasopharyngeal Swab  Bilirubin, Urine NEGATIVE NEGATIVE    Ketones, Urine 1+ (A) NEGATIVE mg/dL    Specific Gravity, UA 1.015 1.010 - 1.025    Urine Hgb TRACE (A) NEGATIVE    pH, Urine 6.0 5.0 - 6.0    Protein, UA NEGATIVE NEGATIVE mg/dL    Urobilinogen, Urine Normal 0.0 - 1.0 EU/dL    Nitrite, Urine NEGATIVE NEGATIVE    Leukocyte Esterase, Urine NEGATIVE NEGATIVE   Brain Natriuretic Peptide   Result Value Ref Range    NT Pro-BNP 84 0 - 125 pg/mL   Lactic Acid   Result Value Ref Range    Lactic Acid 1.5 mmol/L   D-Dimer, Quantitative   Result Value Ref Range    D-Dimer, Quant 0.94 (H) 0.00 - 0.59 ug/mL FEU   Microscopic Urinalysis   Result Value Ref Range    WBC, UA 2 TO 5 0 - 4 /HPF    RBC, UA 0 TO 2 0 - 4 /HPF    Epithelial Cells, UA 2 TO 5 0 - 5 /HPF    Bacteria, UA FEW (A) None    Other Observations UA (A) NOT REQ.     Utilizing a urinalysis as the only screening method to exclude a potential uropathogen can be unreliable in many patient populations.  Rapid screening tests are less sensitive than culture and if UTI is a clinical possibility, culture should be considered despite a negative urinalysis.     EKG 12 Lead   Result Value Ref Range    Ventricular Rate 125 BPM    Atrial Rate 125 BPM    P-R Interval 152 ms    QRS Duration 102 ms    Q-T Interval 322 ms    QTc Calculation (Bazett) 464 ms    P Axis 64 degrees    R Axis 116 degrees    T Axis 58 degrees       EMERGENCY DEPARTMENT COURSE:   Vitals:    Vitals:    05/03/25 0608 05/03/25 0615 05/03/25 0619 05/03/25 0645   BP:  (!) 151/86     Pulse: (!) 102      Resp:    18   Temp:    98.2 °F (36.8 °C)   TempSrc:    Temporal   SpO2: 90% 92% 94% 91%   Weight:    (!) 140.4 kg (309 lb 8 oz)   Height:    1.88 m (6' 2\")     -------------------------  BP: (!) 151/86, Temp: 98.2 °F (36.8 °C), Pulse: (!) 102, Respirations: 18    FINAL IMPRESSION      1. Pneumonia due to infectious organism, unspecified laterality, unspecified part of lung    2. Sepsis, due to unspecified organism, unspecified

## 2025-05-04 VITALS
DIASTOLIC BLOOD PRESSURE: 78 MMHG | RESPIRATION RATE: 20 BRPM | BODY MASS INDEX: 39.49 KG/M2 | TEMPERATURE: 97.8 F | WEIGHT: 307.7 LBS | OXYGEN SATURATION: 95 % | HEIGHT: 74 IN | HEART RATE: 94 BPM | SYSTOLIC BLOOD PRESSURE: 115 MMHG

## 2025-05-04 LAB
ANION GAP SERPL CALCULATED.3IONS-SCNC: 14 MMOL/L (ref 9–16)
BASOPHILS # BLD: 0 K/UL (ref 0–0.2)
BASOPHILS NFR BLD: 0 % (ref 0–2)
BUN SERPL-MCNC: 30 MG/DL (ref 8–23)
BUN/CREAT SERPL: 25 (ref 9–20)
CALCIUM SERPL-MCNC: 8.9 MG/DL (ref 8.6–10.4)
CHLORIDE SERPL-SCNC: 100 MMOL/L (ref 98–107)
CO2 SERPL-SCNC: 21 MMOL/L (ref 20–31)
CREAT SERPL-MCNC: 1.2 MG/DL (ref 0.7–1.2)
EOSINOPHIL # BLD: 0 K/UL (ref 0–0.4)
EOSINOPHILS RELATIVE PERCENT: 0 % (ref 1–8)
ERYTHROCYTE [DISTWIDTH] IN BLOOD BY AUTOMATED COUNT: 15.3 % (ref 11.8–14.4)
GFR, ESTIMATED: 64 ML/MIN/1.73M2
GLUCOSE BLD-MCNC: 251 MG/DL (ref 75–110)
GLUCOSE BLD-MCNC: 296 MG/DL (ref 75–110)
GLUCOSE SERPL-MCNC: 353 MG/DL (ref 74–99)
HCT VFR BLD AUTO: 38.2 % (ref 40.7–50.3)
HGB BLD-MCNC: 12.8 G/DL (ref 13–17)
IMM GRANULOCYTES # BLD AUTO: 0 K/UL (ref 0–0.3)
IMM GRANULOCYTES NFR BLD: 0 %
LYMPHOCYTES NFR BLD: 0.67 K/UL (ref 1–4.8)
LYMPHOCYTES RELATIVE PERCENT: 11 % (ref 15–43)
MCH RBC QN AUTO: 30.4 PG (ref 25.2–33.5)
MCHC RBC AUTO-ENTMCNC: 33.5 G/DL (ref 25.2–33.5)
MCV RBC AUTO: 90.7 FL (ref 82.6–102.9)
MONOCYTES NFR BLD: 0.24 K/UL (ref 0.1–1.2)
MONOCYTES NFR BLD: 4 % (ref 6–14)
MORPHOLOGY: ABNORMAL
MORPHOLOGY: ABNORMAL
NEUTROPHILS NFR BLD: 85 % (ref 44–74)
NEUTS SEG NFR BLD: 5.19 K/UL (ref 1.5–8.1)
NRBC BLD-RTO: 0 PER 100 WBC
PLATELET # BLD AUTO: 106 K/UL (ref 138–453)
PMV BLD AUTO: 11.8 FL (ref 8.1–13.5)
POTASSIUM SERPL-SCNC: 4.4 MMOL/L (ref 3.7–5.3)
RBC # BLD AUTO: 4.21 M/UL (ref 4.21–5.77)
SODIUM SERPL-SCNC: 135 MMOL/L (ref 136–145)
WBC OTHER # BLD: 6.1 K/UL (ref 3.5–11.3)

## 2025-05-04 PROCEDURE — 6370000000 HC RX 637 (ALT 250 FOR IP)

## 2025-05-04 PROCEDURE — 94760 N-INVAS EAR/PLS OXIMETRY 1: CPT

## 2025-05-04 PROCEDURE — 6370000000 HC RX 637 (ALT 250 FOR IP): Performed by: FAMILY MEDICINE

## 2025-05-04 PROCEDURE — 6360000002 HC RX W HCPCS

## 2025-05-04 PROCEDURE — 94640 AIRWAY INHALATION TREATMENT: CPT

## 2025-05-04 PROCEDURE — 99239 HOSP IP/OBS DSCHRG MGMT >30: CPT | Performed by: FAMILY MEDICINE

## 2025-05-04 PROCEDURE — 82947 ASSAY GLUCOSE BLOOD QUANT: CPT

## 2025-05-04 PROCEDURE — 80048 BASIC METABOLIC PNL TOTAL CA: CPT

## 2025-05-04 PROCEDURE — 2500000003 HC RX 250 WO HCPCS

## 2025-05-04 PROCEDURE — 36415 COLL VENOUS BLD VENIPUNCTURE: CPT

## 2025-05-04 PROCEDURE — 94669 MECHANICAL CHEST WALL OSCILL: CPT

## 2025-05-04 PROCEDURE — 94668 MNPJ CHEST WALL SBSQ: CPT

## 2025-05-04 PROCEDURE — 85025 COMPLETE CBC W/AUTO DIFF WBC: CPT

## 2025-05-04 RX ORDER — INSULIN LISPRO 100 [IU]/ML
15 INJECTION, SOLUTION INTRAVENOUS; SUBCUTANEOUS
Status: DISCONTINUED | OUTPATIENT
Start: 2025-05-04 | End: 2025-05-04 | Stop reason: HOSPADM

## 2025-05-04 RX ORDER — INSULIN GLARGINE 100 [IU]/ML
78 INJECTION, SOLUTION SUBCUTANEOUS 2 TIMES DAILY
Status: DISCONTINUED | OUTPATIENT
Start: 2025-05-04 | End: 2025-05-04 | Stop reason: HOSPADM

## 2025-05-04 RX ORDER — AZITHROMYCIN 500 MG/1
500 TABLET, FILM COATED ORAL DAILY
Qty: 1 PACKET | Refills: 0 | Status: SHIPPED | OUTPATIENT
Start: 2025-05-04 | End: 2025-05-07

## 2025-05-04 RX ORDER — BENZONATATE 100 MG/1
100 CAPSULE ORAL 3 TIMES DAILY PRN
Qty: 30 CAPSULE | Refills: 0 | Status: SHIPPED | OUTPATIENT
Start: 2025-05-04 | End: 2025-05-14

## 2025-05-04 RX ORDER — CEFUROXIME AXETIL 250 MG/1
250 TABLET ORAL 2 TIMES DAILY
Qty: 14 TABLET | Refills: 0 | Status: SHIPPED | OUTPATIENT
Start: 2025-05-04 | End: 2025-05-11

## 2025-05-04 RX ADMIN — GABAPENTIN 300 MG: 300 CAPSULE ORAL at 09:21

## 2025-05-04 RX ADMIN — CILOSTAZOL 100 MG: 100 TABLET ORAL at 09:21

## 2025-05-04 RX ADMIN — BUPROPION HYDROCHLORIDE 100 MG: 100 TABLET, EXTENDED RELEASE ORAL at 09:23

## 2025-05-04 RX ADMIN — FINASTERIDE 5 MG: 5 TABLET, FILM COATED ORAL at 09:21

## 2025-05-04 RX ADMIN — INSULIN LISPRO 8 UNITS: 100 INJECTION, SOLUTION INTRAVENOUS; SUBCUTANEOUS at 12:20

## 2025-05-04 RX ADMIN — INSULIN LISPRO 16 UNITS: 100 INJECTION, SOLUTION INTRAVENOUS; SUBCUTANEOUS at 09:22

## 2025-05-04 RX ADMIN — APIXABAN 2.5 MG: 2.5 TABLET, FILM COATED ORAL at 09:21

## 2025-05-04 RX ADMIN — IPRATROPIUM BROMIDE AND ALBUTEROL SULFATE 1 DOSE: .5; 2.5 SOLUTION RESPIRATORY (INHALATION) at 13:05

## 2025-05-04 RX ADMIN — INSULIN LISPRO 15 UNITS: 100 INJECTION, SOLUTION INTRAVENOUS; SUBCUTANEOUS at 12:21

## 2025-05-04 RX ADMIN — AZITHROMYCIN DIHYDRATE 500 MG: 250 TABLET ORAL at 04:50

## 2025-05-04 RX ADMIN — CARVEDILOL 6.25 MG: 3.12 TABLET, FILM COATED ORAL at 18:01

## 2025-05-04 RX ADMIN — IPRATROPIUM BROMIDE AND ALBUTEROL SULFATE 1 DOSE: .5; 2.5 SOLUTION RESPIRATORY (INHALATION) at 08:04

## 2025-05-04 RX ADMIN — SODIUM CHLORIDE, PRESERVATIVE FREE 10 ML: 5 INJECTION INTRAVENOUS at 04:50

## 2025-05-04 RX ADMIN — CLOPIDOGREL BISULFATE 75 MG: 75 TABLET ORAL at 09:21

## 2025-05-04 RX ADMIN — TRAMADOL HYDROCHLORIDE 50 MG: 50 TABLET, COATED ORAL at 09:21

## 2025-05-04 RX ADMIN — CARVEDILOL 6.25 MG: 3.12 TABLET, FILM COATED ORAL at 09:21

## 2025-05-04 RX ADMIN — GUAIFENESIN 600 MG: 600 TABLET, EXTENDED RELEASE ORAL at 09:21

## 2025-05-04 RX ADMIN — IPRATROPIUM BROMIDE AND ALBUTEROL SULFATE 1 DOSE: .5; 2.5 SOLUTION RESPIRATORY (INHALATION) at 03:38

## 2025-05-04 RX ADMIN — IPRATROPIUM BROMIDE AND ALBUTEROL SULFATE 1 DOSE: .5; 2.5 SOLUTION RESPIRATORY (INHALATION) at 16:22

## 2025-05-04 RX ADMIN — BENZONATATE 100 MG: 100 CAPSULE ORAL at 06:50

## 2025-05-04 RX ADMIN — GABAPENTIN 300 MG: 300 CAPSULE ORAL at 16:16

## 2025-05-04 RX ADMIN — INSULIN LISPRO 15 UNITS: 100 INJECTION, SOLUTION INTRAVENOUS; SUBCUTANEOUS at 18:00

## 2025-05-04 RX ADMIN — TAMSULOSIN HYDROCHLORIDE 0.4 MG: 0.4 CAPSULE ORAL at 09:21

## 2025-05-04 RX ADMIN — WATER 1000 MG: 1 INJECTION INTRAMUSCULAR; INTRAVENOUS; SUBCUTANEOUS at 04:49

## 2025-05-04 RX ADMIN — INSULIN LISPRO 8 UNITS: 100 INJECTION, SOLUTION INTRAVENOUS; SUBCUTANEOUS at 18:00

## 2025-05-04 RX ADMIN — SODIUM CHLORIDE, PRESERVATIVE FREE 10 ML: 5 INJECTION INTRAVENOUS at 12:21

## 2025-05-04 RX ADMIN — FUROSEMIDE 20 MG: 20 TABLET ORAL at 09:21

## 2025-05-04 RX ADMIN — INSULIN GLARGINE 72 UNITS: 100 INJECTION, SOLUTION SUBCUTANEOUS at 09:22

## 2025-05-04 ASSESSMENT — PAIN DESCRIPTION - LOCATION
LOCATION: KNEE
LOCATION: KNEE

## 2025-05-04 ASSESSMENT — PAIN SCALES - GENERAL
PAINLEVEL_OUTOF10: 4
PAINLEVEL_OUTOF10: 3
PAINLEVEL_OUTOF10: 0

## 2025-05-04 ASSESSMENT — PAIN DESCRIPTION - DESCRIPTORS: DESCRIPTORS: ACHING

## 2025-05-04 ASSESSMENT — PAIN DESCRIPTION - ORIENTATION: ORIENTATION: LEFT

## 2025-05-04 ASSESSMENT — PAIN - FUNCTIONAL ASSESSMENT: PAIN_FUNCTIONAL_ASSESSMENT: ACTIVITIES ARE NOT PREVENTED

## 2025-05-04 ASSESSMENT — PAIN SCALES - WONG BAKER: WONGBAKER_NUMERICALRESPONSE: NO HURT

## 2025-05-04 NOTE — DISCHARGE SUMMARY
Discharge Summary    Ranjeet Wagoner Patient Status:  Inpatient    1950 MRN [id number]   Location [facility]  PROGRESSIVE CARE Attending Dewey Awan MD   Hosp Day # 1 PCP Jacek Taylor MD     Date of Admission: 5/3/2025    Date of Discharge: 2025    Admitting Diagnosis: COPD exacerbation (HCC) [J44.1]  Pneumonia of both lower lobes due to infectious organism [J18.9]  Pneumonia due to infectious organism, unspecified laterality, unspecified part of lung [J18.9]  Sepsis, due to unspecified organism, unspecified whether acute organ dysfunction present (HCC) [A41.9]    Discharge Diagnosis: Principal Problem:    Pneumonia due to infectious organism  Active Problems:    JANET on CPAP    Type 2 diabetes mellitus, with long-term current use of insulin (HCC)  Resolved Problems:    * No resolved hospital problems. *      Reason for Admission/HPI: Shortness of breath with cough and fever with history of COPD.Chest CT in the ER showed bibasilar pneumonia    Hospital Course:Patient was on IV Rocephin and Zithromax for pneumonia , DuoNeb aerosol treatments as needed has not needed any supplemental oxygen during his stay here.  History of diabetes mellitus type 2 his blood sugars were running high here since he received dose of Solumedrol in the ER and his insulin dose was titrated with meal time coverage had  improvement of blood sugars gradually.  Patient's respiratory symptoms have improved his breathing is back to his baseline and his lungs are clear on exam.   Been afebrile and his white count has been stable, blood cultures were negative. Home on Ceftin and Zithromax and continue with home dose of insulins along with mealtime coverage per sliding scale as before.  Follow-up with PCP in a week    Consultations: None    Procedures: None    Complications: None    Disposition: Home    Discharge Condition: Fair    Discharge Medications:      Medication List        START taking these medications      azithromycin  500 MG tablet  Commonly known as: Zithromax  Take 1 tablet by mouth daily for 3 days     benzonatate 100 MG capsule  Commonly known as: TESSALON  Take 1 capsule by mouth 3 times daily as needed for Cough     cefUROXime 250 MG tablet  Commonly known as: CEFTIN  Take 1 tablet by mouth 2 times daily for 7 days            CONTINUE taking these medications      acetaminophen 325 MG tablet  Commonly known as: TYLENOL     albuterol sulfate  (90 Base) MCG/ACT inhaler  Commonly known as: PROVENTIL;VENTOLIN;PROAIR     apixaban 5 MG Tabs tablet  Commonly known as: ELIQUIS  Take 0.5 tablets by mouth 2 times daily     Apple Cider Vinegar 300 MG Tabs     atorvastatin 80 MG tablet  Commonly known as: LIPITOR     buPROPion 100 MG extended release tablet  Commonly known as: WELLBUTRIN SR     carvedilol 6.25 MG tablet  Commonly known as: COREG  Take 1 tablet by mouth 2 times daily (with meals)     cilostazol 100 MG tablet  Commonly known as: PLETAL     clopidogrel 75 MG tablet  Commonly known as: PLAVIX     finasteride 5 MG tablet  Commonly known as: PROSCAR     fluticasone-salmeterol 250-50 MCG/DOSE Aepb  Commonly known as: ADVAIR     FreeStyle Yovany 2 Sensor Misc     furosemide 20 MG tablet  Commonly known as: LASIX  Take 1 tablet by mouth daily     gabapentin 300 MG capsule  Commonly known as: NEURONTIN     Lyumjev KwikPen 100 UNIT/ML Sopn  Generic drug: Insulin Lispro-aabc (1 U Dial)     medihoney wound/burn dressing Gel gel  Apply 1 each topically daily     Mounjaro 5 MG/0.5ML Soaj pen  Generic drug: Tirzepatide     OneTouch Ultra Test strip  Generic drug: blood glucose test strips     tamsulosin 0.4 MG capsule  Commonly known as: FLOMAX  Take 1 capsule by mouth in the morning and at bedtime     testosterone cypionate 200 MG/ML injection  Commonly known as: DEPOTESTOTERONE CYPIONATE     Toujeo Max SoloStar 300 UNIT/ML concentrated injection pen  Generic drug: Insulin Glargine (2 Unit Dial)  Inject 90 Units into the skin

## 2025-05-04 NOTE — PROGRESS NOTES
Hospitalist Progress Note  5/4/2025 9:27 AM  Subjective:   Admit Date: 5/3/2025  PCP: Jacek Taylor MD    Interval History: Breathing is improved has a cough no fever.Blood sugars have been running high.Blood cultures no growth    Diet: ADULT DIET; Regular; 5 carb choices (75 gm/meal); Low Fat/Low Chol/High Fiber/ZA  Medications:   Scheduled Meds:   insulin glargine  78 Units SubCUTAneous BID    apixaban  2.5 mg Oral BID    atorvastatin  80 mg Oral Daily    buPROPion  100 mg Oral BID    carvedilol  6.25 mg Oral BID WC    cilostazol  100 mg Oral BID    clopidogrel  75 mg Oral Daily    finasteride  5 mg Oral Daily    furosemide  20 mg Oral Daily    gabapentin  300 mg Oral TID    tamsulosin  0.4 mg Oral BID    ipratropium 0.5 mg-albuterol 2.5 mg  1 Dose Inhalation Q4H RT    sodium chloride flush  5-40 mL IntraVENous 2 times per day    guaiFENesin  600 mg Oral BID    insulin lispro  0-16 Units SubCUTAneous 4x Daily AC & HS    cefTRIAXone (ROCEPHIN) IV  1,000 mg IntraVENous Q24H    And    azithromycin  500 mg Oral Q24H    Dulaglutide  3 mg SubCUTAneous Weekly     Continuous Infusions:   dextrose      sodium chloride       CBC:   Recent Labs     05/03/25  0050 05/04/25  0531   WBC 10.9 6.1   HGB 13.7 12.8*   PLT 96* 106*     BMP:    Recent Labs     05/03/25  0050 05/04/25  0531    135*   K 4.5 4.4   CL 99 100   CO2 21 21   BUN 21 30*   CREATININE 1.2 1.2   GLUCOSE 200* 353*     Hepatic:   Recent Labs     05/03/25  0050   AST 13   ALT 17   BILITOT 1.0   ALKPHOS 122     Troponin: No results for input(s): \"TROPONINI\" in the last 72 hours.  BNP: No results for input(s): \"BNP\" in the last 72 hours.  Lipids: No results for input(s): \"CHOL\", \"HDL\" in the last 72 hours.    Invalid input(s): \"LDLCALCU\"  INR: No results for input(s): \"INR\" in the last 72 hours.    Objective:   Vitals: /78   Pulse 78   Temp 98.2 °F (36.8 °C) (Temporal)   Resp 17   Ht 1.88 m (6' 2\")   Wt (!) 139.6 kg (307 lb 11.2 oz)   SpO2 93%

## 2025-05-04 NOTE — PROGRESS NOTES
Pt d/c to home via private vehicle with daughter after instructions reviewed and all questions answered. belongings returned including clothes, Wellbutrin and pocket knife

## 2025-05-04 NOTE — CARDIO/PULMONARY
Refuses our autopap. Patient stated, \"I will be okay for the night, I dont want to use hospitals\"

## 2025-05-05 ENCOUNTER — FOLLOWUP TELEPHONE ENCOUNTER (OUTPATIENT)
Dept: INPATIENT UNIT | Age: 75
End: 2025-05-05

## 2025-05-05 LAB
EKG ATRIAL RATE: 125 BPM
EKG P AXIS: 64 DEGREES
EKG P-R INTERVAL: 152 MS
EKG Q-T INTERVAL: 322 MS
EKG QRS DURATION: 102 MS
EKG QTC CALCULATION (BAZETT): 464 MS
EKG R AXIS: 116 DEGREES
EKG T AXIS: 58 DEGREES
EKG VENTRICULAR RATE: 125 BPM

## 2025-08-13 DIAGNOSIS — M25.562 LEFT KNEE PAIN, UNSPECIFIED CHRONICITY: Primary | ICD-10-CM

## 2025-08-27 ENCOUNTER — OFFICE VISIT (OUTPATIENT)
Dept: ORTHOPEDIC SURGERY | Age: 75
End: 2025-08-27
Payer: MEDICARE

## 2025-08-27 ENCOUNTER — HOSPITAL ENCOUNTER (OUTPATIENT)
Dept: GENERAL RADIOLOGY | Age: 75
Discharge: HOME | End: 2025-08-29
Payer: MEDICARE

## 2025-08-27 VITALS
RESPIRATION RATE: 18 BRPM | SYSTOLIC BLOOD PRESSURE: 108 MMHG | WEIGHT: 308 LBS | HEART RATE: 106 BPM | OXYGEN SATURATION: 96 % | HEIGHT: 74 IN | DIASTOLIC BLOOD PRESSURE: 68 MMHG | BODY MASS INDEX: 39.53 KG/M2

## 2025-08-27 DIAGNOSIS — M17.12 PRIMARY OSTEOARTHRITIS OF LEFT KNEE: ICD-10-CM

## 2025-08-27 DIAGNOSIS — I73.9 PAD (PERIPHERAL ARTERY DISEASE): Primary | ICD-10-CM

## 2025-08-27 DIAGNOSIS — M25.562 LEFT KNEE PAIN, UNSPECIFIED CHRONICITY: ICD-10-CM

## 2025-08-27 PROCEDURE — 3017F COLORECTAL CA SCREEN DOC REV: CPT | Performed by: STUDENT IN AN ORGANIZED HEALTH CARE EDUCATION/TRAINING PROGRAM

## 2025-08-27 PROCEDURE — 99214 OFFICE O/P EST MOD 30 MIN: CPT | Performed by: STUDENT IN AN ORGANIZED HEALTH CARE EDUCATION/TRAINING PROGRAM

## 2025-08-27 PROCEDURE — G8417 CALC BMI ABV UP PARAM F/U: HCPCS | Performed by: STUDENT IN AN ORGANIZED HEALTH CARE EDUCATION/TRAINING PROGRAM

## 2025-08-27 PROCEDURE — 99203 OFFICE O/P NEW LOW 30 MIN: CPT | Performed by: STUDENT IN AN ORGANIZED HEALTH CARE EDUCATION/TRAINING PROGRAM

## 2025-08-27 PROCEDURE — 1123F ACP DISCUSS/DSCN MKR DOCD: CPT | Performed by: STUDENT IN AN ORGANIZED HEALTH CARE EDUCATION/TRAINING PROGRAM

## 2025-08-27 PROCEDURE — 1159F MED LIST DOCD IN RCRD: CPT | Performed by: STUDENT IN AN ORGANIZED HEALTH CARE EDUCATION/TRAINING PROGRAM

## 2025-08-27 PROCEDURE — G8427 DOCREV CUR MEDS BY ELIG CLIN: HCPCS | Performed by: STUDENT IN AN ORGANIZED HEALTH CARE EDUCATION/TRAINING PROGRAM

## 2025-08-27 PROCEDURE — 73562 X-RAY EXAM OF KNEE 3: CPT

## 2025-08-27 PROCEDURE — 3078F DIAST BP <80 MM HG: CPT | Performed by: STUDENT IN AN ORGANIZED HEALTH CARE EDUCATION/TRAINING PROGRAM

## 2025-08-27 PROCEDURE — 3074F SYST BP LT 130 MM HG: CPT | Performed by: STUDENT IN AN ORGANIZED HEALTH CARE EDUCATION/TRAINING PROGRAM

## 2025-08-27 PROCEDURE — 1036F TOBACCO NON-USER: CPT | Performed by: STUDENT IN AN ORGANIZED HEALTH CARE EDUCATION/TRAINING PROGRAM

## 2025-09-02 ENCOUNTER — HOSPITAL ENCOUNTER (OUTPATIENT)
Dept: INTERVENTIONAL RADIOLOGY/VASCULAR | Age: 75
Discharge: HOME | End: 2025-09-04
Attending: STUDENT IN AN ORGANIZED HEALTH CARE EDUCATION/TRAINING PROGRAM
Payer: MEDICARE

## 2025-09-02 DIAGNOSIS — I73.9 PAD (PERIPHERAL ARTERY DISEASE): ICD-10-CM

## 2025-09-02 PROCEDURE — 93922 UPR/L XTREMITY ART 2 LEVELS: CPT

## 2025-09-03 LAB
VAS LEFT ABI: 0.43 RATIO
VAS LEFT ARM BP: 141 MMHG
VAS LEFT DORSALIS PEDIS BP: 61 MMHG
VAS RIGHT ARM BP: 129 MMHG

## 2025-09-03 PROCEDURE — 93922 UPR/L XTREMITY ART 2 LEVELS: CPT | Performed by: STUDENT IN AN ORGANIZED HEALTH CARE EDUCATION/TRAINING PROGRAM

## 2025-09-04 ENCOUNTER — TELEPHONE (OUTPATIENT)
Dept: ORTHOPEDIC SURGERY | Age: 75
End: 2025-09-04

## 2025-09-05 ENCOUNTER — TELEPHONE (OUTPATIENT)
Dept: ORTHOPEDIC SURGERY | Age: 75
End: 2025-09-05